# Patient Record
Sex: FEMALE | Race: BLACK OR AFRICAN AMERICAN | NOT HISPANIC OR LATINO | Employment: FULL TIME | URBAN - METROPOLITAN AREA
[De-identification: names, ages, dates, MRNs, and addresses within clinical notes are randomized per-mention and may not be internally consistent; named-entity substitution may affect disease eponyms.]

---

## 2017-01-23 DIAGNOSIS — M32.14 LUPUS NEPHRITIS: ICD-10-CM

## 2017-01-23 DIAGNOSIS — M32.9 LUPUS (SYSTEMIC LUPUS ERYTHEMATOSUS): Primary | ICD-10-CM

## 2017-01-23 RX ORDER — MYCOPHENOLATE MOFETIL 250 MG/1
250 CAPSULE ORAL 2 TIMES DAILY
Qty: 60 CAPSULE | Refills: 0 | Status: SHIPPED | OUTPATIENT
Start: 2017-01-23 | End: 2017-01-25

## 2017-01-23 NOTE — TELEPHONE ENCOUNTER
----- Message from Rocio Crow MA sent at 1/23/2017  1:20 PM CST -----  Contact: self/home      ----- Message -----     From: Purnima Rios     Sent: 1/23/2017  12:26 PM       To: [facility] Staff    Pt would like a refill on her melatonin medication and would like it to be called in to [facility]. Pt would also like a refill to be added to the rx. Pt would also like to speak with you regarding her medication.

## 2017-01-23 NOTE — TELEPHONE ENCOUNTER
Patient requesting mycophenolate refill.  Patient will get labs done at [facility] 11:30 am Tuesday January 24th

## 2017-01-24 ENCOUNTER — LAB VISIT (OUTPATIENT)
Dept: LAB | Facility: HOSPITAL | Age: 52
End: 2017-01-24
Attending: INTERNAL MEDICINE
Payer: COMMERCIAL

## 2017-01-24 DIAGNOSIS — M32.14 LUPUS NEPHRITIS: ICD-10-CM

## 2017-01-24 DIAGNOSIS — M32.9 LUPUS (SYSTEMIC LUPUS ERYTHEMATOSUS): ICD-10-CM

## 2017-01-24 LAB
ALBUMIN SERPL BCP-MCNC: 3.6 G/DL
ALP SERPL-CCNC: 48 U/L
ALT SERPL W/O P-5'-P-CCNC: 11 U/L
ANION GAP SERPL CALC-SCNC: 6 MMOL/L
AST SERPL-CCNC: 16 U/L
BASOPHILS # BLD AUTO: 0 K/UL
BASOPHILS NFR BLD: 0 %
BILIRUB SERPL-MCNC: 0.3 MG/DL
BUN SERPL-MCNC: 42 MG/DL
C3 SERPL-MCNC: 89 MG/DL
C4 SERPL-MCNC: 26 MG/DL
CALCIUM SERPL-MCNC: 9.8 MG/DL
CHLORIDE SERPL-SCNC: 110 MMOL/L
CO2 SERPL-SCNC: 23 MMOL/L
CREAT SERPL-MCNC: 2.2 MG/DL
CRP SERPL-MCNC: 0.4 MG/L
DIFFERENTIAL METHOD: ABNORMAL
EOSINOPHIL # BLD AUTO: 0 K/UL
EOSINOPHIL NFR BLD: 0 %
ERYTHROCYTE [DISTWIDTH] IN BLOOD BY AUTOMATED COUNT: 12.7 %
ERYTHROCYTE [SEDIMENTATION RATE] IN BLOOD BY WESTERGREN METHOD: 43 MM/HR
EST. GFR  (AFRICAN AMERICAN): 28.5 ML/MIN/1.73 M^2
EST. GFR  (NON AFRICAN AMERICAN): 24.7 ML/MIN/1.73 M^2
GLUCOSE SERPL-MCNC: 83 MG/DL
HCT VFR BLD AUTO: 34.1 %
HGB BLD-MCNC: 10.9 G/DL
LYMPHOCYTES # BLD AUTO: 1.3 K/UL
LYMPHOCYTES NFR BLD: 20.3 %
MCH RBC QN AUTO: 29.4 PG
MCHC RBC AUTO-ENTMCNC: 32 %
MCV RBC AUTO: 92 FL
MONOCYTES # BLD AUTO: 0.5 K/UL
MONOCYTES NFR BLD: 7.4 %
NEUTROPHILS # BLD AUTO: 4.6 K/UL
NEUTROPHILS NFR BLD: 72 %
PLATELET # BLD AUTO: 311 K/UL
PMV BLD AUTO: 10.7 FL
POTASSIUM SERPL-SCNC: 5.1 MMOL/L
PROT SERPL-MCNC: 7.4 G/DL
RBC # BLD AUTO: 3.71 M/UL
SODIUM SERPL-SCNC: 139 MMOL/L
WBC # BLD AUTO: 6.35 K/UL

## 2017-01-24 PROCEDURE — 80053 COMPREHEN METABOLIC PANEL: CPT

## 2017-01-24 PROCEDURE — 86140 C-REACTIVE PROTEIN: CPT

## 2017-01-24 PROCEDURE — 86160 COMPLEMENT ANTIGEN: CPT | Mod: 59

## 2017-01-24 PROCEDURE — 85651 RBC SED RATE NONAUTOMATED: CPT

## 2017-01-24 PROCEDURE — 36415 COLL VENOUS BLD VENIPUNCTURE: CPT | Mod: PO

## 2017-01-24 PROCEDURE — 85025 COMPLETE CBC W/AUTO DIFF WBC: CPT

## 2017-01-24 PROCEDURE — 86225 DNA ANTIBODY NATIVE: CPT

## 2017-01-24 PROCEDURE — 86160 COMPLEMENT ANTIGEN: CPT

## 2017-01-25 ENCOUNTER — TELEPHONE (OUTPATIENT)
Dept: RHEUMATOLOGY | Facility: CLINIC | Age: 52
End: 2017-01-25

## 2017-01-25 DIAGNOSIS — M32.9 SYSTEMIC LUPUS ERYTHEMATOSUS, UNSPECIFIED SLE TYPE, UNSPECIFIED ORGAN INVOLVEMENT STATUS: ICD-10-CM

## 2017-01-25 DIAGNOSIS — M32.14 LUPUS NEPHRITIS: ICD-10-CM

## 2017-01-25 LAB — DSDNA AB SER-ACNC: NORMAL [IU]/ML

## 2017-01-25 RX ORDER — MYCOPHENOLATE MOFETIL 500 MG/1
500 TABLET ORAL 2 TIMES DAILY
Qty: 60 TABLET | Refills: 2 | Status: SHIPPED | OUTPATIENT
Start: 2017-01-25 | End: 2017-01-31 | Stop reason: SDUPTHER

## 2017-01-25 NOTE — TELEPHONE ENCOUNTER
Patient informed of labs. Increase in proteinuria. Will increase Cellcept 500 mg bid.  Repeat labs at next visit.

## 2017-01-27 ENCOUNTER — TELEPHONE (OUTPATIENT)
Dept: RHEUMATOLOGY | Facility: CLINIC | Age: 52
End: 2017-01-27

## 2017-01-27 NOTE — TELEPHONE ENCOUNTER
----- Message from Rocio Crow MA sent at 1/25/2017  1:39 PM CST -----  Contact: self       ----- Message -----     From: Taisha Lynne MA     Sent: 1/25/2017   1:14 PM       To: [facility] Staff    Medication that was called in cant get filled at [facility]'s  mycophenolate (CELLCEPT) 500 mg Tab and pt is pricey and wants to know if she can get another prescription since they insurance is getting triple the price. Can you see about a peer to peer for pt.  Pt can reached be at [phone].

## 2017-01-27 NOTE — TELEPHONE ENCOUNTER
Left message for patient to call.  Called pharmacy [facility] for clarification on the issue. [facility] is not in her preferred network but if she calls  and tells them that she wants [facility] to fill it they will allow it.  This has nothing to do with the physician.      Called patient and left the above information.

## 2017-01-31 ENCOUNTER — TELEPHONE (OUTPATIENT)
Dept: RHEUMATOLOGY | Facility: CLINIC | Age: 52
End: 2017-01-31

## 2017-01-31 DIAGNOSIS — M32.9 SYSTEMIC LUPUS ERYTHEMATOSUS, UNSPECIFIED SLE TYPE, UNSPECIFIED ORGAN INVOLVEMENT STATUS: ICD-10-CM

## 2017-01-31 DIAGNOSIS — M32.14 LUPUS NEPHRITIS: ICD-10-CM

## 2017-01-31 RX ORDER — MYCOPHENOLATE MOFETIL 500 MG/1
500 TABLET ORAL 2 TIMES DAILY
Qty: 60 TABLET | Refills: 2 | Status: SHIPPED | OUTPATIENT
Start: 2017-01-31 | End: 2018-01-12 | Stop reason: SDUPTHER

## 2017-01-31 NOTE — TELEPHONE ENCOUNTER
----- Message from Rehana Dos Santos sent at 1/31/2017  1:54 PM CST -----  Contact: [facility]/ Jorge [phone], Option 1  Pls send prescription for mycophenolate (CELLCEPT) 500 mg Tab to [facility] / [phone] (Fax)

## 2017-03-08 ENCOUNTER — TELEPHONE (OUTPATIENT)
Dept: RHEUMATOLOGY | Facility: CLINIC | Age: 52
End: 2017-03-08

## 2017-11-01 ENCOUNTER — OFFICE VISIT (OUTPATIENT)
Dept: OBSTETRICS AND GYNECOLOGY | Facility: CLINIC | Age: 52
End: 2017-11-01
Payer: COMMERCIAL

## 2017-11-01 ENCOUNTER — LAB VISIT (OUTPATIENT)
Dept: LAB | Facility: HOSPITAL | Age: 52
End: 2017-11-01
Attending: OBSTETRICS & GYNECOLOGY
Payer: COMMERCIAL

## 2017-11-01 VITALS
BODY MASS INDEX: 28.27 KG/M2 | DIASTOLIC BLOOD PRESSURE: 84 MMHG | WEIGHT: 180.13 LBS | SYSTOLIC BLOOD PRESSURE: 120 MMHG

## 2017-11-01 DIAGNOSIS — Z12.11 COLON CANCER SCREENING: ICD-10-CM

## 2017-11-01 DIAGNOSIS — N95.2 ATROPHIC VAGINITIS: ICD-10-CM

## 2017-11-01 DIAGNOSIS — Z01.419 ENCOUNTER FOR GYNECOLOGICAL EXAMINATION WITHOUT ABNORMAL FINDING: ICD-10-CM

## 2017-11-01 DIAGNOSIS — Z01.419 ENCOUNTER FOR GYNECOLOGICAL EXAMINATION WITHOUT ABNORMAL FINDING: Primary | ICD-10-CM

## 2017-11-01 DIAGNOSIS — Z12.4 CERVICAL CANCER SCREENING: ICD-10-CM

## 2017-11-01 DIAGNOSIS — Z12.31 VISIT FOR SCREENING MAMMOGRAM: ICD-10-CM

## 2017-11-01 PROCEDURE — 36415 COLL VENOUS BLD VENIPUNCTURE: CPT

## 2017-11-01 PROCEDURE — 87340 HEPATITIS B SURFACE AG IA: CPT

## 2017-11-01 PROCEDURE — 86695 HERPES SIMPLEX TYPE 1 TEST: CPT

## 2017-11-01 PROCEDURE — 86703 HIV-1/HIV-2 1 RESULT ANTBDY: CPT

## 2017-11-01 PROCEDURE — 86803 HEPATITIS C AB TEST: CPT

## 2017-11-01 PROCEDURE — 87591 N.GONORRHOEAE DNA AMP PROB: CPT

## 2017-11-01 PROCEDURE — 87624 HPV HI-RISK TYP POOLED RSLT: CPT

## 2017-11-01 PROCEDURE — 99386 PREV VISIT NEW AGE 40-64: CPT | Mod: S$GLB,,, | Performed by: OBSTETRICS & GYNECOLOGY

## 2017-11-01 PROCEDURE — 99999 PR PBB SHADOW E&M-EST. PATIENT-LVL IV: CPT | Mod: PBBFAC,,, | Performed by: OBSTETRICS & GYNECOLOGY

## 2017-11-01 PROCEDURE — 86706 HEP B SURFACE ANTIBODY: CPT

## 2017-11-01 PROCEDURE — 86592 SYPHILIS TEST NON-TREP QUAL: CPT

## 2017-11-01 PROCEDURE — 88175 CYTOPATH C/V AUTO FLUID REDO: CPT

## 2017-11-01 RX ORDER — ESTRADIOL 10 UG/1
1 INSERT VAGINAL
Qty: 8 TABLET | Refills: 11 | Status: SHIPPED | OUTPATIENT
Start: 2017-11-02 | End: 2018-07-19

## 2017-11-01 NOTE — PROGRESS NOTES
51 yo postmenopausal female who presents for routine gyn visit.  Reports no cycles since 2010.   . Desires STD testing. Has argument with  today and patient appears sad/distracted in the office  No h/o STDs.  No h/o abl Pap smears. Last pap many years ago.  No h/o abl mammograms. Last mammogram many years ago.    ROS: reports vaginal dryness.    PE:   Vitals: /84   Wt 81.7 kg (180 lb 1.9 oz)   LMP 05/17/2010   BMI 28.27 kg/m²   APPEARANCE: Well nourished, well developed, in no acute distress.  SKIN: Normal skin turgor, no lesions.  CHEST: Lungs clear to auscultation.  HEART: Regular rate and rhythm, no murmurs, rubs or gallops.  ABDOMEN: Soft. No tenderness or masses. No hepatosplenomegaly. No hernias.  BREASTS: Symmetrical, no skin changes or visible lesions. No palpable masses, nipple discharge or adenopathy bilaterally.  PELVIC: Normal external female genitalia without lesions. Normal hair distribution. Adequate perineal body, normal urethral meatus. Vagina moist and well rugated without lesions or discharge. Cervix pink and without lesions. No significant cystocele or rectocele. Bimanual exam showed uterus normal size, shape, position, mobile and nontender. Adnexa without masses or tenderness. Urethra and bladder normal.  EXTREMITIES: No clubbing cyanosis or edema.      AP  Routine gyn  -s/p normal breast exam: mammogram ordered  -s/p normal pelvic exam:   -Pap and HPV: collected  -STD testing: gc/chl and HIV, hep b, hep c, rpr, hsv ordered  -colonoscopy: referred to dr. lainez  -atrophic vaginitis: rx for vagifem sent    F/u in 3 months to discuss satisfaction and compliance with new medication    daylin maki MD

## 2017-11-02 LAB
C TRACH DNA SPEC QL NAA+PROBE: NOT DETECTED
HBV SURFACE AB SER-ACNC: NEGATIVE M[IU]/ML
HBV SURFACE AG SERPL QL IA: NEGATIVE
HCV AB SERPL QL IA: NEGATIVE
HIV 1+2 AB+HIV1 P24 AG SERPL QL IA: NEGATIVE
N GONORRHOEA DNA SPEC QL NAA+PROBE: NOT DETECTED
RPR SER QL: NORMAL

## 2017-11-03 LAB
HSV1 IGG SERPL QL IA: POSITIVE
HSV2 IGG SERPL QL IA: POSITIVE

## 2017-11-07 ENCOUNTER — TELEPHONE (OUTPATIENT)
Dept: OBSTETRICS AND GYNECOLOGY | Facility: CLINIC | Age: 52
End: 2017-11-07

## 2017-11-07 DIAGNOSIS — B00.9 HSV INFECTION: Primary | ICD-10-CM

## 2017-11-07 LAB
HPV16 AG SPEC QL: NEGATIVE
HPV16+18+H RISK 12 DNA CVX-IMP: POSITIVE
HPV18 DNA SPEC QL NAA+PROBE: NEGATIVE

## 2017-11-07 RX ORDER — ACYCLOVIR 800 MG/1
800 TABLET ORAL 2 TIMES DAILY
Qty: 60 TABLET | Refills: 12 | Status: SHIPPED | OUTPATIENT
Start: 2017-11-07 | End: 2018-07-19

## 2017-11-07 NOTE — TELEPHONE ENCOUNTER
Pt. Was given her HSV results and she will take her HSV meds. For 5 days, for her out break, she will call if she ever gets an outbreak and ask for a refill.

## 2017-11-07 NOTE — TELEPHONE ENCOUNTER
----- Message from Carroll Palma MD sent at 11/2/2017 10:33 PM CDT -----  Inform patient: HIV, hep B and Hep C, RPR, and gonorrhea/chlamydia are negative for infection.  Waiting for herpes results.    Dr palma

## 2017-11-07 NOTE — TELEPHONE ENCOUNTER
Pt notified and verbalized understanding. Pt is requesting suppressive therapy. Information being sent out to patient.

## 2017-11-13 ENCOUNTER — TELEPHONE (OUTPATIENT)
Dept: OBSTETRICS AND GYNECOLOGY | Facility: CLINIC | Age: 52
End: 2017-11-13

## 2017-11-13 NOTE — TELEPHONE ENCOUNTER
Inform patient that her Pap was negative. But, it was positive for HPV.  We worry about type 16 and type 18 HPV. She was NEGATIVE for this type of HPV.   Recommendation is to repeat pap in one year to see if the other types of HPV have cleared up.     Dr maki     L/danielito for patient to give us a call back to give results above

## 2017-11-13 NOTE — TELEPHONE ENCOUNTER
----- Message from Patricia Ang sent at 11/13/2017 11:28 AM CST -----  Contact: Self/ [phone]  Patient called in returning your call.     Please call and advise.

## 2017-11-29 ENCOUNTER — TELEPHONE (OUTPATIENT)
Dept: GASTROENTEROLOGY | Facility: CLINIC | Age: 52
End: 2017-11-29

## 2017-11-29 RX ORDER — LOSARTAN POTASSIUM 100 MG/1
100 TABLET ORAL DAILY
Refills: 1 | COMMUNITY
Start: 2017-11-11 | End: 2018-07-19

## 2017-11-29 NOTE — TELEPHONE ENCOUNTER
Patient scheduled for COLONOSCOPY with Dr. Worley on 12/19/2017.  Miralax Split Prep instructions explained and mailed to patient . Lab will call two days before with arrival time at [facility]. Make sure to have someone to drive you home after procedure.  Verbalized understanding.    Colonoscopy Referral   Referring Physician: Dr. Carroll Palma   Date: 12/19/2017  Reason for Referral: Colon Cancer Screening  Family History of:   Colon polyp:  NO  Relationship/Age of Onset: N/A  Colon cancer: NO  Relationship/Age of Onset: N/A  Patient with:   Hemoccults Done: NO   Iron deficient: NO  On Blood Thinner: NO  Valvular heart disease/valve replacement:  NO  Anemia Present: Yes  On NSAID: NO  Lung disease: NO  Kidney disease: NO  Hx of polyps:NO  Hx of colon cancer: NO  Previous colon evalations: YES      When: 2011  Where: [facility]  Pertinent symptoms: NONE

## 2017-12-13 ENCOUNTER — OFFICE VISIT (OUTPATIENT)
Dept: RHEUMATOLOGY | Facility: CLINIC | Age: 52
End: 2017-12-13
Payer: COMMERCIAL

## 2017-12-13 ENCOUNTER — LAB VISIT (OUTPATIENT)
Dept: LAB | Facility: HOSPITAL | Age: 52
End: 2017-12-13
Attending: INTERNAL MEDICINE
Payer: COMMERCIAL

## 2017-12-13 VITALS
SYSTOLIC BLOOD PRESSURE: 151 MMHG | HEART RATE: 82 BPM | DIASTOLIC BLOOD PRESSURE: 87 MMHG | BODY MASS INDEX: 29.49 KG/M2 | WEIGHT: 177 LBS | TEMPERATURE: 98 F | HEIGHT: 65 IN

## 2017-12-13 DIAGNOSIS — R53.83 OTHER FATIGUE: ICD-10-CM

## 2017-12-13 DIAGNOSIS — E55.9 VITAMIN D DEFICIENCY DISEASE: ICD-10-CM

## 2017-12-13 DIAGNOSIS — M32.14 SYSTEMIC LUPUS ERYTHEMATOSUS WITH GLOMERULAR DISEASE, UNSPECIFIED SLE TYPE: ICD-10-CM

## 2017-12-13 DIAGNOSIS — M32.14 SYSTEMIC LUPUS ERYTHEMATOSUS WITH GLOMERULAR DISEASE, UNSPECIFIED SLE TYPE: Primary | ICD-10-CM

## 2017-12-13 DIAGNOSIS — D84.9 IMMUNOSUPPRESSION: ICD-10-CM

## 2017-12-13 LAB
BACTERIA #/AREA URNS AUTO: NORMAL /HPF
BILIRUB UR QL STRIP: NEGATIVE
CLARITY UR REFRACT.AUTO: CLEAR
COLOR UR AUTO: ABNORMAL
CREAT UR-MCNC: 116 MG/DL
GLUCOSE UR QL STRIP: NEGATIVE
HGB UR QL STRIP: ABNORMAL
HYALINE CASTS UR QL AUTO: 0 /LPF
KETONES UR QL STRIP: NEGATIVE
LEUKOCYTE ESTERASE UR QL STRIP: NEGATIVE
MICROSCOPIC COMMENT: NORMAL
NITRITE UR QL STRIP: NEGATIVE
PH UR STRIP: 5 [PH] (ref 5–8)
PROT UR QL STRIP: ABNORMAL
PROT UR-MCNC: 138 MG/DL
PROT/CREAT RATIO, UR: 1.19
RBC #/AREA URNS AUTO: 3 /HPF (ref 0–4)
SP GR UR STRIP: 1.01 (ref 1–1.03)
SQUAMOUS #/AREA URNS AUTO: 0 /HPF
URN SPEC COLLECT METH UR: ABNORMAL
UROBILINOGEN UR STRIP-ACNC: NEGATIVE EU/DL
WBC #/AREA URNS AUTO: 0 /HPF (ref 0–5)

## 2017-12-13 PROCEDURE — 82570 ASSAY OF URINE CREATININE: CPT

## 2017-12-13 PROCEDURE — 81001 URINALYSIS AUTO W/SCOPE: CPT

## 2017-12-13 PROCEDURE — 99214 OFFICE O/P EST MOD 30 MIN: CPT | Mod: S$GLB,,, | Performed by: INTERNAL MEDICINE

## 2017-12-13 PROCEDURE — 99999 PR PBB SHADOW E&M-EST. PATIENT-LVL III: CPT | Mod: PBBFAC,,, | Performed by: INTERNAL MEDICINE

## 2017-12-13 NOTE — PROGRESS NOTES
Subjective:       Patient ID: Talya Bates is a 52 y.o. female.    Chief Complaint: Lupus    HPI:  Talya Bates is a 52 y.o. female with systemic lupus erythematosus manifested by Class IV nephritis, history of hemolytic anemia,  malar rash, a low white cell count, arthritis, photosensitivity, hair loss, dry eyes, dry mouth, swollen lymph glands, fatigue and possibly Raynaud's.       Interval History:  Saw Dr Cotton rheumatologist who gave her Cellcept 500 mg qpm in November.   She did not restart Plaquenil as suggested by Dr. Cotton.    Was bitten by something on left side of forehead 4 months ago.  She scratched area and now bigger.  She has skin lesion on right arm.  She will see dermatologist outside [facility].   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     Review of Systems   Constitutional: Positive for fatigue.   HENT:        Dry mouth   Eyes:        Dry eyes   Respiratory: Negative.    Cardiovascular: Negative.    Gastrointestinal: Negative.    Endocrine: Negative.    Genitourinary: Negative.    Musculoskeletal: Negative.    Allergic/Immunologic: Negative.    Neurological: Positive for headaches.   Hematological: Positive for adenopathy.   Psychiatric/Behavioral: Negative.          Objective:   [facility] 05/17/2010      Physical Exam   Constitutional: She is oriented to person, place, and time and well-developed, well-nourished, and in no distress.   HENT:   Head: Normocephalic and atraumatic.   Eyes: Conjunctivae and EOM are normal.   Neck: Neck supple.   Cardiovascular: Normal rate, regular rhythm and normal heart sounds.    Pulmonary/Chest: Effort normal and breath sounds normal.   Abdominal: Soft. Bowel sounds are normal.   Neurological: She is alert and oriented to person, place, and time. Gait normal.   Skin: Skin is warm and dry.     1x1cm hyperpigmentation on left side forehead   Right arm with light pink scab  Left arm healing scab   Psychiatric: Mood and affect normal.   Musculoskeletal: She  exhibits no edema or deformity.            Assessment:       1. History of systemic lupus erythematosus with manifestations with Class IV nephritis, history of hemolytic anemia,  malar rash, a low white cell count, arthritis, photosensitivity, hair loss, dry eyes, dry mouth,   swollen lymph glands, fatigue and possibly Raynaud's.  She is currently on Cellcept and losartan.  Off prednisone for 1 year.   2. Right temporal artery pain and enlargement. Biopsy negative.   CTA of head and neck without vasculitis but small lymph nodes noted.  3. Fatigue. Patient working one job still.   4. Myalgias with a history of fibromyalgia.   5. Mood swings.   6. Elevated BP in the past   7. Erythema nodosum lower legs. No further episodes  8. Bilateral knee pain in the past. Suspect it may be osteoarthritis.  9. Proteinuria.   10. Vitamin D deficiency  11. Memory loss. Continues to have memory loss. Possibly due to lupus. Needs evaluation with cognitive.   12. Abnormal bone density. Patient did not  get result from [facility]  Plan:       1. Patient not on Plaquenil 200mg bid.  Now on Cellcept 500 mg evening.  Tolerating  2. Labs todays  3. Can use Voltaren gel for joint issues  4. Patient to follow with nephrology at [facility].   5. Evaluated by neurology for memory loss and cognitive testing. EEG normal but did not get MRI.  6. Marital stress    The patient was seen face-to-face for 25 minutes. Greater than 50% of   this time was spent in counseling regarding management of systemic lupus   erythematosus and memory.      RTO 3 months/prn.

## 2017-12-14 ENCOUNTER — TELEPHONE (OUTPATIENT)
Dept: RHEUMATOLOGY | Facility: CLINIC | Age: 52
End: 2017-12-14

## 2017-12-14 DIAGNOSIS — M32.14 SYSTEMIC LUPUS ERYTHEMATOSUS WITH GLOMERULAR DISEASE, UNSPECIFIED SLE TYPE: Primary | ICD-10-CM

## 2017-12-14 DIAGNOSIS — R53.83 OTHER FATIGUE: ICD-10-CM

## 2017-12-14 DIAGNOSIS — D84.9 IMMUNOSUPPRESSION: ICD-10-CM

## 2017-12-14 NOTE — TELEPHONE ENCOUNTER
Labs show low white blood cell count, anemia, elevated sedimentation rate, elevated creatinine at 2.8, low vitamin D at 22, normal complements, and elevated protein creatinine ratio 1.19.    Patient informed of results.  She notes kidney test was 21 recently (assuming that it is the GFR).   She started Cellcept one month ago and now WBC count is lower.  Will hold on increasing Cellcept until labs reviewed in a month.  She will get Plaquenil eye exam and we will start Plaquenil.  Labs in one month at [facility] 1/12/18 at 11 am     Patient to get vitamin D3 OTC 1,000 units and take daily.

## 2017-12-15 ENCOUNTER — TELEPHONE (OUTPATIENT)
Dept: GASTROENTEROLOGY | Facility: CLINIC | Age: 52
End: 2017-12-15

## 2017-12-23 ENCOUNTER — OFFICE VISIT (OUTPATIENT)
Dept: URGENT CARE | Facility: CLINIC | Age: 52
End: 2017-12-23
Payer: COMMERCIAL

## 2017-12-23 VITALS
OXYGEN SATURATION: 100 % | HEIGHT: 66 IN | SYSTOLIC BLOOD PRESSURE: 139 MMHG | BODY MASS INDEX: 28.93 KG/M2 | WEIGHT: 180 LBS | TEMPERATURE: 101 F | HEART RATE: 115 BPM | DIASTOLIC BLOOD PRESSURE: 89 MMHG

## 2017-12-23 DIAGNOSIS — R68.89 FLU-LIKE SYMPTOMS: ICD-10-CM

## 2017-12-23 DIAGNOSIS — R00.0 TACHYCARDIA: ICD-10-CM

## 2017-12-23 DIAGNOSIS — R50.9 FEVER, UNSPECIFIED FEVER CAUSE: ICD-10-CM

## 2017-12-23 DIAGNOSIS — J10.1 INFLUENZA A: Primary | ICD-10-CM

## 2017-12-23 DIAGNOSIS — R05.9 COUGH: ICD-10-CM

## 2017-12-23 LAB
CTP QC/QA: YES
FLUAV AG NPH QL: POSITIVE
FLUBV AG NPH QL: NEGATIVE

## 2017-12-23 PROCEDURE — 87804 INFLUENZA ASSAY W/OPTIC: CPT | Mod: QW,S$GLB,, | Performed by: PHYSICIAN ASSISTANT

## 2017-12-23 PROCEDURE — 99203 OFFICE O/P NEW LOW 30 MIN: CPT | Mod: 25,S$GLB,, | Performed by: PHYSICIAN ASSISTANT

## 2017-12-23 RX ORDER — IBUPROFEN 200 MG
600 TABLET ORAL
Status: COMPLETED | OUTPATIENT
Start: 2017-12-23 | End: 2017-12-23

## 2017-12-23 RX ORDER — OSELTAMIVIR PHOSPHATE 75 MG/1
75 CAPSULE ORAL 2 TIMES DAILY
Qty: 10 CAPSULE | Refills: 0 | Status: SHIPPED | OUTPATIENT
Start: 2017-12-23 | End: 2017-12-28

## 2017-12-23 RX ORDER — PROMETHAZINE HYDROCHLORIDE AND DEXTROMETHORPHAN HYDROBROMIDE 6.25; 15 MG/5ML; MG/5ML
5 SYRUP ORAL
Qty: 118 ML | Refills: 0 | Status: SHIPPED | OUTPATIENT
Start: 2017-12-23 | End: 2017-12-30

## 2017-12-23 RX ADMIN — Medication 600 MG: at 09:12

## 2017-12-23 NOTE — PROGRESS NOTES
"Subjective:       Patient ID: Talya Bates is a 52 y.o. female.    Vitals:  height is 5' 6" (1.676 m) and weight is 81.6 kg (180 lb). Her temperature is 100.9 °F (38.3 °C) (abnormal). Her blood pressure is 139/89 and her pulse is 115 (abnormal). Her oxygen saturation is 100%.     Chief Complaint: Cough and Nasal Congestion    Cough   This is a new problem. The current episode started 1 to 4 weeks ago. The problem has been gradually worsening. The problem occurs every few minutes. The cough is non-productive. Associated symptoms include chills, ear congestion, ear pain, a fever, headaches, myalgias, nasal congestion and postnasal drip. Pertinent negatives include no shortness of breath. The symptoms are aggravated by lying down. Treatments tried: metamucil flu and cold. The treatment provided no relief.     Review of Systems   Constitution: Positive for chills, decreased appetite, fever and malaise/fatigue.   HENT: Positive for congestion, ear pain and postnasal drip.    Respiratory: Positive for cough. Negative for shortness of breath.    Musculoskeletal: Positive for myalgias.   Neurological: Positive for headaches.       Objective:      Physical Exam   Constitutional: She is oriented to person, place, and time. She appears well-developed and well-nourished. She appears listless. She is cooperative.  Non-toxic appearance. She appears ill. No distress.   HENT:   Head: Normocephalic and atraumatic.   Right Ear: Hearing, external ear and ear canal normal. Tympanic membrane is erythematous. Tympanic membrane is not retracted and not bulging. A middle ear effusion (serous) is present.   Left Ear: Hearing, external ear and ear canal normal. Tympanic membrane is erythematous. Tympanic membrane is not retracted and not bulging.  No middle ear effusion.   Nose: Mucosal edema and rhinorrhea present. No nasal deformity. No epistaxis. Right sinus exhibits no maxillary sinus tenderness and no frontal sinus tenderness. " Left sinus exhibits no maxillary sinus tenderness and no frontal sinus tenderness.   Mouth/Throat: Uvula is midline and mucous membranes are normal. No trismus in the jaw. Normal dentition. No uvula swelling. Posterior oropharyngeal erythema present. No oropharyngeal exudate or posterior oropharyngeal edema. No tonsillar exudate.   Eyes: Conjunctivae and lids are normal. Right eye exhibits no discharge. Left eye exhibits no discharge. No scleral icterus.   Sclera clear bilat   Neck: Trachea normal, normal range of motion, full passive range of motion without pain and phonation normal. Neck supple.   Cardiovascular: Regular rhythm, normal heart sounds, intact distal pulses and normal pulses.  Tachycardia present.    Pulmonary/Chest: Effort normal and breath sounds normal. No respiratory distress. She has no decreased breath sounds. She has no wheezes. She has no rhonchi. She has no rales.   Abdominal: Normal appearance.   Musculoskeletal: Normal range of motion. She exhibits no edema or deformity.   Lymphadenopathy:     She has no cervical adenopathy.   Neurological: She is oriented to person, place, and time. She appears listless. She exhibits normal muscle tone. Coordination normal.   Skin: Skin is warm, dry and intact. She is not diaphoretic. No pallor.   Psychiatric: She has a normal mood and affect. Her speech is normal and behavior is normal. Judgment and thought content normal. Cognition and memory are normal.   Nursing note and vitals reviewed.      Office Visit on 12/23/2017   Component Date Value Ref Range Status    Rapid Influenza A Ag 12/23/2017 Positive* Negative Final    Rapid Influenza B Ag 12/23/2017 Negative  Negative Final     Acceptable 12/23/2017 Yes   Final         Assessment:       1. Influenza A    2. Flu-like symptoms    3. Fever, unspecified fever cause    4. Tachycardia    5. Cough        Plan:         Influenza A  -     oseltamivir (TAMIFLU) 75 MG capsule; Take 1 capsule  (75 mg total) by mouth 2 (two) times daily.  Dispense: 10 capsule; Refill: 0    Flu-like symptoms  -     POCT Influenza A/B    Fever, unspecified fever cause  -     ibuprofen tablet 600 mg; Take 3 tablets (600 mg total) by mouth one time.    Tachycardia    Cough  -     promethazine-dextromethorphan (PROMETHAZINE-DM) 6.25-15 mg/5 mL Syrp; Take 5 mLs by mouth every 4 to 6 hours as needed.  Dispense: 118 mL; Refill: 0          Patient Instructions   Please return here or go to the Emergency Department for any concerns or worsening of condition.  If you were prescribed antibiotics, please take them to completion.  If you were prescribed a narcotic medication, do not drive or operate heavy equipment or machinery while taking these medications.  Please follow up with your primary care doctor or specialist as needed.    If you  smoke, please stop smoking.    Symptomatic treatment:    Tylenol every 4 hours  Ibuprofen ever 6 hours  salt water gargles  Cold-eeze helps to reduce the duration of sore throat symptoms  Cepachol helps to numb the discomfort  Chloroseptic spray  Nasal saline spray reduces inflammation and dryness  Warm face compresses as often as you can  Vicks vapor rub at night  Flonase OTC or Nasacort OTC  Simple foods like chicken noodle soup help  Mucinex DM helps loosen mucus and control cough  Zyrtec/Claritin during the day and Benadryl at night may help if allergy component   Rest as much as you can      Influenza (Adult)    Influenza is also called the flu. It is a viral illness that affects the air passages of your lungs. It is different from the common cold. The flu can easily be passed from one to person to another. It may be spread through the air by coughing and sneezing. Or it can be spread by touching the sick person and then touching your own eyes, nose, or mouth.  The flu starts 1 to 3 days after you are exposed to the flu virus. It may last for 1 to 2 weeks but many people feel tired or fatigued  for many weeks afterward. You usually dont need to take antibiotics unless you have a complication. This might be an ear or sinus infection or pneumonia.  Symptoms of the flu may be mild or severe. They can include extreme tiredness (wanting to stay in bed all day), chills, fevers, muscle aches, soreness with eye movement, headache, and a dry, hacking cough.  Home care  Follow these guidelines when caring for yourself at home:  · Avoid being around cigarette smoke, whether yours or other peoples.  · Acetaminophen or ibuprofen will help ease your fever, muscle aches, and headache. Dont give aspirin to anyone younger than 18 who has the flu. Aspirin can harm the liver.  · Nausea and loss of appetite are common with the flu. Eat light meals. Drink 6 to 8 glasses of liquids every day. Good choices are water, sport drinks, soft drinks without caffeine, juices, tea, and soup. Extra fluids will also help loosen secretions in your nose and lungs.  · Over-the-counter cold medicines will not make the flu go away faster. But the medicines may help with coughing, sore throat, and congestion in your nose and sinuses. Dont use a decongestant if you have high blood pressure.  · Stay home until your fever has been gone for at least 24 hours without using medicine to reduce fever.  Follow-up care  Follow up with your healthcare provider, or as advised, if you are not getting better over the next week.  If you are age 65 or older, talk with your provider about getting a pneumococcal vaccine every 5 years. You should also get this vaccine if you have chronic asthma or COPD. All adults should get a flu vaccine every fall. Ask your provider about this.  When to seek medical advice  Call your healthcare provider right away if any of these occur:  · Cough with lots of colored mucus (sputum) or blood in your mucus  · Chest pain, shortness of breath, wheezing, or trouble breathing  · Severe headache, or face, neck, or ear pain  · New  rash with fever  · Fever of 100.4°F (38°C) or higher, or as directed by your healthcare provider  · Confusion, behavior change, or seizure  · Severe weakness or dizziness  · You get a new fever or cough after getting better for a few days  Date Last Reviewed: 1/1/2017  © 8395-0789 [employer]. [address] [place] [address], [place] [address]. All rights reserved. This information is not intended as a substitute for professional medical care. Always follow your healthcare professional's instructions.

## 2017-12-23 NOTE — PATIENT INSTRUCTIONS
Please return here or go to the Emergency Department for any concerns or worsening of condition.  If you were prescribed antibiotics, please take them to completion.  If you were prescribed a narcotic medication, do not drive or operate heavy equipment or machinery while taking these medications.  Please follow up with your primary care doctor or specialist as needed.    If you  smoke, please stop smoking.    Symptomatic treatment:    Tylenol every 4 hours  Ibuprofen ever 6 hours  salt water gargles  Cold-eeze helps to reduce the duration of sore throat symptoms  Cepachol helps to numb the discomfort  Chloroseptic spray  Nasal saline spray reduces inflammation and dryness  Warm face compresses as often as you can  Vicks vapor rub at night  Flonase OTC or Nasacort OTC  Simple foods like chicken noodle soup help  Mucinex DM helps loosen mucus and control cough  Zyrtec/Claritin during the day and Benadryl at night may help if allergy component   Rest as much as you can      Influenza (Adult)    Influenza is also called the flu. It is a viral illness that affects the air passages of your lungs. It is different from the common cold. The flu can easily be passed from one to person to another. It may be spread through the air by coughing and sneezing. Or it can be spread by touching the sick person and then touching your own eyes, nose, or mouth.  The flu starts 1 to 3 days after you are exposed to the flu virus. It may last for 1 to 2 weeks but many people feel tired or fatigued for many weeks afterward. You usually dont need to take antibiotics unless you have a complication. This might be an ear or sinus infection or pneumonia.  Symptoms of the flu may be mild or severe. They can include extreme tiredness (wanting to stay in bed all day), chills, fevers, muscle aches, soreness with eye movement, headache, and a dry, hacking cough.  Home care  Follow these guidelines when caring for yourself at home:  · Avoid being  around cigarette smoke, whether yours or other peoples.  · Acetaminophen or ibuprofen will help ease your fever, muscle aches, and headache. Dont give aspirin to anyone younger than 18 who has the flu. Aspirin can harm the liver.  · Nausea and loss of appetite are common with the flu. Eat light meals. Drink 6 to 8 glasses of liquids every day. Good choices are water, sport drinks, soft drinks without caffeine, juices, tea, and soup. Extra fluids will also help loosen secretions in your nose and lungs.  · Over-the-counter cold medicines will not make the flu go away faster. But the medicines may help with coughing, sore throat, and congestion in your nose and sinuses. Dont use a decongestant if you have high blood pressure.  · Stay home until your fever has been gone for at least 24 hours without using medicine to reduce fever.  Follow-up care  Follow up with your healthcare provider, or as advised, if you are not getting better over the next week.  If you are age 65 or older, talk with your provider about getting a pneumococcal vaccine every 5 years. You should also get this vaccine if you have chronic asthma or COPD. All adults should get a flu vaccine every fall. Ask your provider about this.  When to seek medical advice  Call your healthcare provider right away if any of these occur:  · Cough with lots of colored mucus (sputum) or blood in your mucus  · Chest pain, shortness of breath, wheezing, or trouble breathing  · Severe headache, or face, neck, or ear pain  · New rash with fever  · Fever of 100.4°F (38°C) or higher, or as directed by your healthcare provider  · Confusion, behavior change, or seizure  · Severe weakness or dizziness  · You get a new fever or cough after getting better for a few days  Date Last Reviewed: 1/1/2017  © 3297-8786 [employer]. [address] [place] [address], [place] [address]. All rights reserved. This information is not intended as a substitute for professional medical  care. Always follow your healthcare professional's instructions.

## 2018-01-12 ENCOUNTER — LAB VISIT (OUTPATIENT)
Dept: LAB | Facility: HOSPITAL | Age: 53
End: 2018-01-12
Attending: INTERNAL MEDICINE
Payer: COMMERCIAL

## 2018-01-12 DIAGNOSIS — M32.14 SYSTEMIC LUPUS ERYTHEMATOSUS WITH GLOMERULAR DISEASE, UNSPECIFIED SLE TYPE: ICD-10-CM

## 2018-01-12 DIAGNOSIS — D84.9 IMMUNOSUPPRESSION: ICD-10-CM

## 2018-01-12 DIAGNOSIS — M32.14 LUPUS NEPHRITIS: ICD-10-CM

## 2018-01-12 DIAGNOSIS — R53.83 OTHER FATIGUE: ICD-10-CM

## 2018-01-12 DIAGNOSIS — M32.9 SYSTEMIC LUPUS ERYTHEMATOSUS, UNSPECIFIED SLE TYPE, UNSPECIFIED ORGAN INVOLVEMENT STATUS: ICD-10-CM

## 2018-01-12 LAB
ALBUMIN SERPL BCP-MCNC: 3.5 G/DL
ALP SERPL-CCNC: 56 U/L
ALT SERPL W/O P-5'-P-CCNC: 11 U/L
ANION GAP SERPL CALC-SCNC: 6 MMOL/L
AST SERPL-CCNC: 20 U/L
BASOPHILS # BLD AUTO: 0 K/UL
BASOPHILS NFR BLD: 0 %
BILIRUB SERPL-MCNC: 0.3 MG/DL
BUN SERPL-MCNC: 35 MG/DL
C3 SERPL-MCNC: 83 MG/DL
C4 SERPL-MCNC: 29 MG/DL
CALCIUM SERPL-MCNC: 9.5 MG/DL
CHLORIDE SERPL-SCNC: 114 MMOL/L
CO2 SERPL-SCNC: 22 MMOL/L
CREAT SERPL-MCNC: 3 MG/DL
CRP SERPL-MCNC: 1.2 MG/L
DIFFERENTIAL METHOD: ABNORMAL
EOSINOPHIL # BLD AUTO: 0 K/UL
EOSINOPHIL NFR BLD: 0 %
ERYTHROCYTE [DISTWIDTH] IN BLOOD BY AUTOMATED COUNT: 14.5 %
ERYTHROCYTE [SEDIMENTATION RATE] IN BLOOD BY WESTERGREN METHOD: 114 MM/HR
EST. GFR  (AFRICAN AMERICAN): 19.4 ML/MIN/1.73 M^2
EST. GFR  (NON AFRICAN AMERICAN): 16.8 ML/MIN/1.73 M^2
GLUCOSE SERPL-MCNC: 80 MG/DL
HCT VFR BLD AUTO: 29.2 %
HGB BLD-MCNC: 9 G/DL
IMM GRANULOCYTES # BLD AUTO: 0.01 K/UL
IMM GRANULOCYTES NFR BLD AUTO: 0.3 %
LYMPHOCYTES # BLD AUTO: 1 K/UL
LYMPHOCYTES NFR BLD: 27.4 %
MCH RBC QN AUTO: 29.7 PG
MCHC RBC AUTO-ENTMCNC: 30.8 G/DL
MCV RBC AUTO: 96 FL
MONOCYTES # BLD AUTO: 0.7 K/UL
MONOCYTES NFR BLD: 19.6 %
NEUTROPHILS # BLD AUTO: 1.8 K/UL
NEUTROPHILS NFR BLD: 52.7 %
NRBC BLD-RTO: 0 /100 WBC
PLATELET # BLD AUTO: 217 K/UL
PMV BLD AUTO: 10.7 FL
POTASSIUM SERPL-SCNC: 4.7 MMOL/L
PROT SERPL-MCNC: 7.6 G/DL
RBC # BLD AUTO: 3.03 M/UL
SODIUM SERPL-SCNC: 142 MMOL/L
WBC # BLD AUTO: 3.47 K/UL

## 2018-01-12 PROCEDURE — 86140 C-REACTIVE PROTEIN: CPT

## 2018-01-12 PROCEDURE — 86160 COMPLEMENT ANTIGEN: CPT

## 2018-01-12 PROCEDURE — 85651 RBC SED RATE NONAUTOMATED: CPT

## 2018-01-12 PROCEDURE — 86225 DNA ANTIBODY NATIVE: CPT

## 2018-01-12 PROCEDURE — 85025 COMPLETE CBC W/AUTO DIFF WBC: CPT

## 2018-01-12 PROCEDURE — 36415 COLL VENOUS BLD VENIPUNCTURE: CPT

## 2018-01-12 PROCEDURE — 86160 COMPLEMENT ANTIGEN: CPT | Mod: 59

## 2018-01-12 PROCEDURE — 80053 COMPREHEN METABOLIC PANEL: CPT

## 2018-01-12 RX ORDER — MYCOPHENOLATE MOFETIL 500 MG/1
500 TABLET ORAL 2 TIMES DAILY
Qty: 60 TABLET | Refills: 2 | Status: ON HOLD | OUTPATIENT
Start: 2018-01-12 | End: 2018-07-24

## 2018-01-15 ENCOUNTER — TELEPHONE (OUTPATIENT)
Dept: RHEUMATOLOGY | Facility: CLINIC | Age: 53
End: 2018-01-15

## 2018-01-15 LAB — DSDNA AB SER-ACNC: NORMAL [IU]/ML

## 2018-01-15 NOTE — TELEPHONE ENCOUNTER
Labs show GFR has decreased.  Hemoglobin has decreased.  WBC improved.  Sed rate elevated.  Complements are normal.  Protein decreased slightly in urine.    Left message for patient to call office with best time to reach her.

## 2018-05-17 ENCOUNTER — CLINICAL SUPPORT (OUTPATIENT)
Dept: OTHER | Facility: CLINIC | Age: 53
End: 2018-05-17
Payer: COMMERCIAL

## 2018-05-17 DIAGNOSIS — Z00.8 HEALTH EXAMINATION IN POPULATION SURVEYS: Primary | ICD-10-CM

## 2018-05-17 PROCEDURE — 80061 LIPID PANEL: CPT | Mod: QW,S$GLB,, | Performed by: INTERNAL MEDICINE

## 2018-05-17 PROCEDURE — 99401 PREV MED CNSL INDIV APPRX 15: CPT | Mod: S$GLB,,, | Performed by: INTERNAL MEDICINE

## 2018-05-17 PROCEDURE — 82947 ASSAY GLUCOSE BLOOD QUANT: CPT | Mod: QW,S$GLB,, | Performed by: INTERNAL MEDICINE

## 2018-05-20 VITALS
BODY MASS INDEX: 28.61 KG/M2 | SYSTOLIC BLOOD PRESSURE: 152 MMHG | WEIGHT: 178 LBS | HEIGHT: 66 IN | DIASTOLIC BLOOD PRESSURE: 83 MMHG

## 2018-05-20 LAB
GLUCOSE SERPL-MCNC: NORMAL MG/DL (ref 60–140)
POC CHOLESTEROL, HDL: 65 MG/DL (ref 40–?)
POC CHOLESTEROL, LDL: 134 MG/DL (ref ?–160)
POC CHOLESTEROL, TOTAL: 217 MG/DL (ref ?–240)
POC GLUCOSE FASTING: 105 MG/DL (ref 60–110)
POC TOTAL CHOLESTEROL / HDL RATIO: 3.34 (ref ?–6)
POC TRIGLYCERIDES: 89 MG/DL (ref ?–160)

## 2018-07-19 ENCOUNTER — HOSPITAL ENCOUNTER (INPATIENT)
Facility: HOSPITAL | Age: 53
LOS: 5 days | Discharge: HOME | DRG: 683 | End: 2018-07-24
Attending: EMERGENCY MEDICINE | Admitting: HOSPITALIST
Payer: COMMERCIAL

## 2018-07-19 DIAGNOSIS — N17.8 ACUTE RENAL FAILURE WITH SPECIFIED LESION: ICD-10-CM

## 2018-07-19 DIAGNOSIS — M32.19 OTHER SYSTEMIC LUPUS ERYTHEMATOSUS WITH OTHER ORGAN INVOLVEMENT: ICD-10-CM

## 2018-07-19 DIAGNOSIS — N17.9 ACUTE RENAL FAILURE SUPERIMPOSED ON CHRONIC KIDNEY DISEASE, UNSPECIFIED CKD STAGE, UNSPECIFIED ACUTE RENAL FAILURE TYPE: ICD-10-CM

## 2018-07-19 DIAGNOSIS — S37.009A INJURY OF KIDNEY, UNSPECIFIED LATERALITY, INITIAL ENCOUNTER: ICD-10-CM

## 2018-07-19 DIAGNOSIS — D64.9 ANEMIA, UNSPECIFIED TYPE: ICD-10-CM

## 2018-07-19 DIAGNOSIS — R06.02 SHORTNESS OF BREATH: ICD-10-CM

## 2018-07-19 DIAGNOSIS — M32.9 SYSTEMIC LUPUS ERYTHEMATOSUS, UNSPECIFIED SLE TYPE, UNSPECIFIED ORGAN INVOLVEMENT STATUS: ICD-10-CM

## 2018-07-19 DIAGNOSIS — N18.4 CKD (CHRONIC KIDNEY DISEASE), STAGE IV: ICD-10-CM

## 2018-07-19 DIAGNOSIS — N30.00 ACUTE CYSTITIS WITHOUT HEMATURIA: ICD-10-CM

## 2018-07-19 DIAGNOSIS — M32.14 LUPUS NEPHRITIS: ICD-10-CM

## 2018-07-19 DIAGNOSIS — N18.9 ACUTE RENAL FAILURE SUPERIMPOSED ON CHRONIC KIDNEY DISEASE, UNSPECIFIED CKD STAGE, UNSPECIFIED ACUTE RENAL FAILURE TYPE: ICD-10-CM

## 2018-07-19 DIAGNOSIS — D59.9 ACQUIRED HEMOLYTIC ANEMIA: ICD-10-CM

## 2018-07-19 DIAGNOSIS — N17.9 AKI (ACUTE KIDNEY INJURY): Primary | ICD-10-CM

## 2018-07-19 PROBLEM — K64.9 BLEEDING HEMORRHOID: Status: ACTIVE | Noted: 2018-07-19

## 2018-07-19 LAB
ABO + RH BLD: NORMAL
ABO + RH BLD: NORMAL
ALBUMIN SERPL BCP-MCNC: 4 G/DL
ALP SERPL-CCNC: 56 U/L
ALT SERPL W/O P-5'-P-CCNC: 10 U/L
ANION GAP SERPL CALC-SCNC: 10 MMOL/L
AST SERPL-CCNC: 22 U/L
BACTERIA #/AREA URNS AUTO: ABNORMAL /HPF
BASOPHILS # BLD AUTO: 0.02 K/UL
BASOPHILS NFR BLD: 0.3 %
BILIRUB SERPL-MCNC: 0.9 MG/DL
BILIRUB UR QL STRIP: NEGATIVE
BLD GP AB SCN CELLS X3 SERPL QL: NORMAL
BLD GP AB SCN CELLS X3 SERPL QL: NORMAL
BNP SERPL-MCNC: 249 PG/ML
BUN SERPL-MCNC: 60 MG/DL
C3 SERPL-MCNC: 67 MG/DL
C4 SERPL-MCNC: 27 MG/DL
CALCIUM SERPL-MCNC: 9.1 MG/DL
CHLORIDE SERPL-SCNC: 114 MMOL/L
CLARITY UR REFRACT.AUTO: ABNORMAL
CO2 SERPL-SCNC: 15 MMOL/L
COLOR UR AUTO: YELLOW
CREAT SERPL-MCNC: 5.5 MG/DL
CREAT UR-MCNC: 103 MG/DL
CREAT UR-MCNC: 103 MG/DL
CRP SERPL-MCNC: 6.48 MG/L
DAT IGG-SP REAG RBC-IMP: NORMAL
DIFFERENTIAL METHOD: ABNORMAL
EOSINOPHIL # BLD AUTO: 0.1 K/UL
EOSINOPHIL NFR BLD: 2.3 %
ERYTHROCYTE [DISTWIDTH] IN BLOOD BY AUTOMATED COUNT: 19.6 %
ERYTHROCYTE [SEDIMENTATION RATE] IN BLOOD BY WESTERGREN METHOD: >150 MM/HR
EST. GFR  (AFRICAN AMERICAN): 9.3 ML/MIN/1.73 M^2
EST. GFR  (NON AFRICAN AMERICAN): 8.1 ML/MIN/1.73 M^2
FERRITIN SERPL-MCNC: 1215 NG/ML
FOLATE SERPL-MCNC: 6.8 NG/ML
GLUCOSE SERPL-MCNC: 104 MG/DL
GLUCOSE UR QL STRIP: NEGATIVE
HAPTOGLOB SERPL-MCNC: <10 MG/DL
HCT VFR BLD AUTO: 17.7 %
HGB BLD-MCNC: 5.6 G/DL
HGB BLD-MCNC: 6.2 G/DL
HGB UR QL STRIP: ABNORMAL
HYALINE CASTS UR QL AUTO: 0 /LPF
IMM GRANULOCYTES # BLD AUTO: 0.1 K/UL
IMM GRANULOCYTES NFR BLD AUTO: 1.6 %
IRON SERPL-MCNC: 111 UG/DL
KETONES UR QL STRIP: NEGATIVE
LDH SERPL L TO P-CCNC: 292 U/L
LEUKOCYTE ESTERASE UR QL STRIP: ABNORMAL
LYMPHOCYTES # BLD AUTO: 1.2 K/UL
LYMPHOCYTES NFR BLD: 19 %
MCH RBC QN AUTO: 33.9 PG
MCHC RBC AUTO-ENTMCNC: 35 G/DL
MCV RBC AUTO: 97 FL
MICROSCOPIC COMMENT: ABNORMAL
MONOCYTES # BLD AUTO: 0.8 K/UL
MONOCYTES NFR BLD: 13.5 %
NEUTROPHILS # BLD AUTO: 3.9 K/UL
NEUTROPHILS NFR BLD: 63.3 %
NITRITE UR QL STRIP: POSITIVE
NRBC BLD-RTO: 0 /100 WBC
PH UR STRIP: 5 [PH] (ref 5–8)
PLATELET # BLD AUTO: 344 K/UL
PMV BLD AUTO: 9.9 FL
POTASSIUM SERPL-SCNC: 4.5 MMOL/L
PROT SERPL-MCNC: 7.9 G/DL
PROT UR QL STRIP: ABNORMAL
PROT UR-MCNC: 63 MG/DL
PROT/CREAT RATIO, UR: 0.61
RBC # BLD AUTO: 1.83 M/UL
RBC #/AREA URNS AUTO: 12 /HPF (ref 0–4)
RETICS/RBC NFR AUTO: 3.9 %
SATURATED IRON: 35 %
SODIUM SERPL-SCNC: 139 MMOL/L
SODIUM UR-SCNC: 39 MMOL/L
SP GR UR STRIP: 1.01 (ref 1–1.03)
SQUAMOUS #/AREA URNS AUTO: 1 /HPF
TOTAL IRON BINDING CAPACITY: 317 UG/DL
TRANSFERRIN SERPL-MCNC: 214 MG/DL
TROPONIN I SERPL DL<=0.01 NG/ML-MCNC: <0.006 NG/ML
URN SPEC COLLECT METH UR: ABNORMAL
UROBILINOGEN UR STRIP-ACNC: NEGATIVE EU/DL
VIT B12 SERPL-MCNC: 499 PG/ML
WBC # BLD AUTO: 6.16 K/UL
WBC #/AREA URNS AUTO: >100 /HPF (ref 0–5)
WBC CLUMPS UR QL AUTO: ABNORMAL

## 2018-07-19 PROCEDURE — 86978 RBC PRETREATMENT SERUM: CPT

## 2018-07-19 PROCEDURE — 86850 RBC ANTIBODY SCREEN: CPT

## 2018-07-19 PROCEDURE — 11000001 HC ACUTE MED/SURG PRIVATE ROOM

## 2018-07-19 PROCEDURE — 86077 PHYS BLOOD BANK SERV XMATCH: CPT | Mod: ,,, | Performed by: PATHOLOGY

## 2018-07-19 PROCEDURE — 81001 URINALYSIS AUTO W/SCOPE: CPT

## 2018-07-19 PROCEDURE — 25000003 PHARM REV CODE 250

## 2018-07-19 PROCEDURE — 86141 C-REACTIVE PROTEIN HS: CPT

## 2018-07-19 PROCEDURE — 86160 COMPLEMENT ANTIGEN: CPT | Mod: 59

## 2018-07-19 PROCEDURE — 63600175 PHARM REV CODE 636 W HCPCS

## 2018-07-19 PROCEDURE — 83880 ASSAY OF NATRIURETIC PEPTIDE: CPT

## 2018-07-19 PROCEDURE — 36415 COLL VENOUS BLD VENIPUNCTURE: CPT

## 2018-07-19 PROCEDURE — 82728 ASSAY OF FERRITIN: CPT

## 2018-07-19 PROCEDURE — 83615 LACTATE (LD) (LDH) ENZYME: CPT

## 2018-07-19 PROCEDURE — 36430 TRANSFUSION BLD/BLD COMPNT: CPT

## 2018-07-19 PROCEDURE — 83010 ASSAY OF HAPTOGLOBIN QUANT: CPT

## 2018-07-19 PROCEDURE — 85018 HEMOGLOBIN: CPT

## 2018-07-19 PROCEDURE — 86905 BLOOD TYPING RBC ANTIGENS: CPT

## 2018-07-19 PROCEDURE — 83540 ASSAY OF IRON: CPT

## 2018-07-19 PROCEDURE — 85045 AUTOMATED RETICULOCYTE COUNT: CPT

## 2018-07-19 PROCEDURE — 93010 ELECTROCARDIOGRAM REPORT: CPT | Mod: ,,, | Performed by: INTERNAL MEDICINE

## 2018-07-19 PROCEDURE — 25000003 PHARM REV CODE 250: Performed by: HOSPITALIST

## 2018-07-19 PROCEDURE — 87088 URINE BACTERIA CULTURE: CPT

## 2018-07-19 PROCEDURE — 82746 ASSAY OF FOLIC ACID SERUM: CPT

## 2018-07-19 PROCEDURE — 99223 1ST HOSP IP/OBS HIGH 75: CPT | Mod: ,,, | Performed by: HOSPITALIST

## 2018-07-19 PROCEDURE — 86160 COMPLEMENT ANTIGEN: CPT

## 2018-07-19 PROCEDURE — 96360 HYDRATION IV INFUSION INIT: CPT

## 2018-07-19 PROCEDURE — 84300 ASSAY OF URINE SODIUM: CPT

## 2018-07-19 PROCEDURE — 80053 COMPREHEN METABOLIC PANEL: CPT

## 2018-07-19 PROCEDURE — 87077 CULTURE AEROBIC IDENTIFY: CPT

## 2018-07-19 PROCEDURE — 87186 SC STD MICRODIL/AGAR DIL: CPT

## 2018-07-19 PROCEDURE — 85025 COMPLETE CBC W/AUTO DIFF WBC: CPT

## 2018-07-19 PROCEDURE — P9021 RED BLOOD CELLS UNIT: HCPCS

## 2018-07-19 PROCEDURE — 87086 URINE CULTURE/COLONY COUNT: CPT

## 2018-07-19 PROCEDURE — 86860 RBC ANTIBODY ELUTION: CPT

## 2018-07-19 PROCEDURE — 99285 EMERGENCY DEPT VISIT HI MDM: CPT | Mod: ,,,

## 2018-07-19 PROCEDURE — 86922 COMPATIBILITY TEST ANTIGLOB: CPT

## 2018-07-19 PROCEDURE — 86870 RBC ANTIBODY IDENTIFICATION: CPT

## 2018-07-19 PROCEDURE — 84484 ASSAY OF TROPONIN QUANT: CPT

## 2018-07-19 PROCEDURE — 82607 VITAMIN B-12: CPT

## 2018-07-19 PROCEDURE — 63600175 PHARM REV CODE 636 W HCPCS: Performed by: HOSPITALIST

## 2018-07-19 PROCEDURE — 86880 COOMBS TEST DIRECT: CPT

## 2018-07-19 PROCEDURE — 82570 ASSAY OF URINE CREATININE: CPT

## 2018-07-19 PROCEDURE — 99285 EMERGENCY DEPT VISIT HI MDM: CPT | Mod: 25

## 2018-07-19 PROCEDURE — 86225 DNA ANTIBODY NATIVE: CPT

## 2018-07-19 PROCEDURE — 85651 RBC SED RATE NONAUTOMATED: CPT

## 2018-07-19 PROCEDURE — 86901 BLOOD TYPING SEROLOGIC RH(D): CPT | Mod: 91

## 2018-07-19 RX ORDER — ACETAMINOPHEN 325 MG/1
650 TABLET ORAL EVERY 4 HOURS PRN
Status: DISCONTINUED | OUTPATIENT
Start: 2018-07-19 | End: 2018-07-24 | Stop reason: HOSPADM

## 2018-07-19 RX ORDER — HYDROCODONE BITARTRATE AND ACETAMINOPHEN 5; 325 MG/1; MG/1
1 TABLET ORAL EVERY 4 HOURS PRN
Status: DISCONTINUED | OUTPATIENT
Start: 2018-07-19 | End: 2018-07-24 | Stop reason: HOSPADM

## 2018-07-19 RX ORDER — METHYLPREDNISOLONE SOD SUCC 125 MG
125 VIAL (EA) INJECTION ONCE
Status: COMPLETED | OUTPATIENT
Start: 2018-07-19 | End: 2018-07-19

## 2018-07-19 RX ORDER — MYCOPHENOLATE MOFETIL 250 MG/1
500 CAPSULE ORAL NIGHTLY
Status: DISCONTINUED | OUTPATIENT
Start: 2018-07-19 | End: 2018-07-24 | Stop reason: HOSPADM

## 2018-07-19 RX ORDER — HYDROCODONE BITARTRATE AND ACETAMINOPHEN 500; 5 MG/1; MG/1
TABLET ORAL
Status: DISCONTINUED | OUTPATIENT
Start: 2018-07-19 | End: 2018-07-24 | Stop reason: HOSPADM

## 2018-07-19 RX ORDER — SODIUM CHLORIDE 0.9 % (FLUSH) 0.9 %
3 SYRINGE (ML) INJECTION EVERY 6 HOURS PRN
Status: DISCONTINUED | OUTPATIENT
Start: 2018-07-19 | End: 2018-07-24 | Stop reason: HOSPADM

## 2018-07-19 RX ORDER — OXYCODONE HYDROCHLORIDE 5 MG/1
15 TABLET ORAL EVERY 4 HOURS PRN
Status: DISCONTINUED | OUTPATIENT
Start: 2018-07-19 | End: 2018-07-24 | Stop reason: HOSPADM

## 2018-07-19 RX ORDER — HYDROCORTISONE 25 MG/G
CREAM TOPICAL 2 TIMES DAILY
Status: DISCONTINUED | OUTPATIENT
Start: 2018-07-19 | End: 2018-07-24 | Stop reason: HOSPADM

## 2018-07-19 RX ORDER — ONDANSETRON 2 MG/ML
4 INJECTION INTRAMUSCULAR; INTRAVENOUS EVERY 8 HOURS PRN
Status: DISCONTINUED | OUTPATIENT
Start: 2018-07-19 | End: 2018-07-24 | Stop reason: HOSPADM

## 2018-07-19 RX ADMIN — HYDROCODONE BITARTRATE AND ACETAMINOPHEN 1 TABLET: 5; 325 TABLET ORAL at 06:07

## 2018-07-19 RX ADMIN — HYDROCORTISONE 2.5%: 25 CREAM TOPICAL at 08:07

## 2018-07-19 RX ADMIN — ONDANSETRON 4 MG: 2 INJECTION INTRAMUSCULAR; INTRAVENOUS at 09:07

## 2018-07-19 RX ADMIN — MYCOPHENOLATE MOFETIL 500 MG: 250 CAPSULE ORAL at 08:07

## 2018-07-19 RX ADMIN — METHYLPREDNISOLONE SODIUM SUCCINATE 125 MG: 125 INJECTION, POWDER, FOR SOLUTION INTRAMUSCULAR; INTRAVENOUS at 06:07

## 2018-07-19 RX ADMIN — HYDROCODONE BITARTRATE AND ACETAMINOPHEN 1 TABLET: 5; 325 TABLET ORAL at 01:07

## 2018-07-19 RX ADMIN — SODIUM CHLORIDE 500 ML: 0.9 INJECTION, SOLUTION INTRAVENOUS at 10:07

## 2018-07-19 RX ADMIN — OXYCODONE HYDROCHLORIDE 15 MG: 5 TABLET ORAL at 08:07

## 2018-07-19 NOTE — MEDICAL/APP STUDENT
History     Chief Complaint   Patient presents with    Multiple Medical Complaints     C/o SOB and dizziness that began this AM. Also c/o painful hemmorhoids x 2 weeks, left wrist pain, and sharp pain through back. HX lupus     This is a 53-year-old female with a PMHx of Lupus, DVT (, not on anticoagulants), and anemia who presents to the ED with SOB. This morning at 4:30 AM, she woke with SOB, fatigue, generalized weakness, and lightheadedness, which has been persistent since. She reports SOB at rest and with exertion. She denies having access to an inhaler/nebulizer at home. She denies a sedentary lifestyle, HRT, or recent long travel. She also has had hemorrhoid pain, mild upper abdominal pain, diarrhea, and decreased appetite for the past 2 weeks. Her last received a blood transfusion 6 years ago, shortly after being diagnosed with a DVT. She denies blood in her stool, melena, fevers, chills, chest pain, headache, dizziness, or any other medical complaints.       The history is provided by the patient, the spouse and a relative.       Past Medical History:   Diagnosis Date    Acid reflux     Allergy     Alopecia     Anemia     Anxiety     Arthritis     Blood transfusion     Chronic kidney disease     Clotting disorder     Deep vein thrombosis     left leg    Dry eyes     Dry mouth     Lupus        Past Surgical History:   Procedure Laterality Date     SECTION      TUBAL LIGATION         Family History   Problem Relation Age of Onset    Colon cancer Maternal Aunt     Colon cancer Maternal Grandfather     Rheum arthritis Mother     Heart disease Mother     Hypertension Sister     Diabetes Mellitus Sister     Diabetes Mellitus Sister     Lupus Neg Hx     Psoriasis Neg Hx        Social History   Substance Use Topics    Smoking status: Never Smoker    Smokeless tobacco: Never Used    Alcohol use No       Review of Systems    Physical Exam   BP (!) 148/70 (BP Location: Left  "arm, Patient Position: Sitting)   Pulse 64   Temp 98.4 °F (36.9 °C) (Oral)   Resp 20   Ht 5' 5" (1.651 m)   Wt 81.6 kg (180 lb)   LMP 05/17/2010   SpO2 99%   Breastfeeding? No   BMI 29.95 kg/m²     Physical Exam    ED Course         "

## 2018-07-19 NOTE — NURSING
Pt transferred to room 927A.  VSS on room air.  Pt transferred with assist to bed.  Family at bedside.  Pt oriented to room and unit.

## 2018-07-19 NOTE — SUBJECTIVE & OBJECTIVE
Past Medical History:   Diagnosis Date    Acid reflux     Alopecia     Anemia     Anxiety     Arthritis     Blood transfusion     Chronic kidney disease     Deep vein thrombosis 2012    left leg; completed anticoagulation; seen by heme/onc    Dry eyes     Dry mouth     Lupus        Past Surgical History:   Procedure Laterality Date     SECTION      SKIN SURGERY      to frontal head    TUBAL LIGATION         Review of patient's allergies indicates:   Allergen Reactions    Ciprofloxacin Other (See Comments)     Face and lips became swollen and throat closed    Avelox [moxifloxacin] Rash    Iodinated contrast- oral and iv dye Itching and Swelling    Reglan [metoclopramide hcl] Other (See Comments)     Nervous and paranoid    Amoxicillin      Other reaction(s): Unknown       No current facility-administered medications on file prior to encounter.      Current Outpatient Prescriptions on File Prior to Encounter   Medication Sig    mycophenolate (CELLCEPT) 500 mg Tab Take 1 tablet (500 mg total) by mouth 2 (two) times daily.    [DISCONTINUED] acyclovir (ZOVIRAX) 800 MG Tab Take 1 tablet (800 mg total) by mouth 2 (two) times daily.    [DISCONTINUED] estradiol 10 mcg Tab Place 1 tablet (10 mcg total) vaginally twice a week.    [DISCONTINUED] losartan (COZAAR) 100 MG tablet Take 100 mg by mouth once daily.    [DISCONTINUED] losartan (COZAAR) 25 MG tablet Take 100 mg by mouth once daily.     [DISCONTINUED] potassium 99 mg Tab Take by mouth once.     Family History     Problem Relation (Age of Onset)    Colon cancer Maternal Aunt, Maternal Grandfather    Diabetes Sister    Diabetes Mellitus Sister, Sister    Heart disease Mother    Hypertension Sister    Kidney disease Mother, Sister    Rheum arthritis Mother        Social History Main Topics    Smoking status: Never Smoker    Smokeless tobacco: Never Used    Alcohol use No    Drug use: No    Sexual activity: Not Currently     Review of  Systems   Constitutional: Positive for activity change and fatigue. Negative for fever.   HENT: Negative for congestion.    Eyes: Negative for discharge.   Respiratory: Positive for shortness of breath. Negative for cough and chest tightness.    Cardiovascular: Negative for chest pain and leg swelling.   Gastrointestinal: Negative for abdominal distention and abdominal pain.   Endocrine: Negative for cold intolerance.   Genitourinary: Negative for difficulty urinating.   Musculoskeletal: Negative for arthralgias.   Skin: Negative for rash.        Hair loss   Allergic/Immunologic: Negative for environmental allergies.   Neurological: Positive for light-headedness.   Hematological: Negative for adenopathy.   Psychiatric/Behavioral: Negative for behavioral problems.     Objective:     Vital Signs (Most Recent):  Temp: 97.6 °F (36.4 °C) (07/19/18 1535)  Pulse: 69 (07/19/18 1535)  Resp: 20 (07/19/18 1535)  BP: (!) 124/59 (07/19/18 1535)  SpO2: 100 % (07/19/18 1535) Vital Signs (24h Range):  Temp:  [96.3 °F (35.7 °C)-98.4 °F (36.9 °C)] 97.6 °F (36.4 °C)  Pulse:  [64-81] 69  Resp:  [18-20] 20  SpO2:  [99 %-100 %] 100 %  BP: (124-148)/(59-70) 124/59     Weight: 78.7 kg (173 lb 9.6 oz)  Body mass index is 28.89 kg/m².    Physical Exam   Constitutional: She is oriented to person, place, and time. No distress.   HENT:   Head: Normocephalic and atraumatic.   Eyes: Left eye exhibits no discharge. No scleral icterus.   Neck: Neck supple. No JVD present.   Cardiovascular: Normal rate, regular rhythm and normal heart sounds.    Pulmonary/Chest: Effort normal and breath sounds normal.   Abdominal: Soft. Bowel sounds are normal.   Musculoskeletal: Normal range of motion. She exhibits no edema.   Neurological: She is alert and oriented to person, place, and time.   Skin: Skin is warm and dry.   Psychiatric: She has a normal mood and affect. Her behavior is normal.           Significant Labs: All pertinent labs within the past 24 hours  have been reviewed.    Significant Imaging: I have reviewed and interpreted all pertinent imaging results/findings within the past 24 hours.

## 2018-07-19 NOTE — ASSESSMENT & PLAN NOTE
Pt denies seeing any blood at home so I do not think this is the cause of her anemia.  -- topical steroids for hemorrhoidal pain

## 2018-07-19 NOTE — ED PROVIDER NOTES
Encounter Date: 2018       History     Chief Complaint   Patient presents with    Multiple Medical Complaints     C/o SOB and dizziness that began this AM. Also c/o painful hemmorhoids x 2 weeks, left wrist pain, and sharp pain through back. HX lupus     HPI  Review of patient's allergies indicates:   Allergen Reactions    Ciprofloxacin Other (See Comments)     Face and lips became swollen and throat closed    Avelox [moxifloxacin] Rash    Iodinated contrast- oral and iv dye Itching and Swelling    Reglan [metoclopramide hcl] Other (See Comments)     Nervous and paranoid    Amoxicillin      Other reaction(s): Unknown     Past Medical History:   Diagnosis Date    Acid reflux     Allergy     Alopecia     Anemia     Anxiety     Arthritis     Blood transfusion     Chronic kidney disease     Clotting disorder     Deep vein thrombosis     left leg    Dry eyes     Dry mouth     Lupus      Past Surgical History:   Procedure Laterality Date     SECTION      TUBAL LIGATION       Family History   Problem Relation Age of Onset    Colon cancer Maternal Aunt     Colon cancer Maternal Grandfather     Rheum arthritis Mother     Heart disease Mother     Hypertension Sister     Diabetes Mellitus Sister     Diabetes Mellitus Sister     Lupus Neg Hx     Psoriasis Neg Hx      Social History   Substance Use Topics    Smoking status: Never Smoker    Smokeless tobacco: Never Used    Alcohol use No     Review of Systems    Physical Exam     Initial Vitals [18 0732]   BP Pulse Resp Temp SpO2   136/63 81 18 98.4 °F (36.9 °C) 100 %      MAP       --         Physical Exam    ED Course   Procedures  Labs Reviewed   CBC W/ AUTO DIFFERENTIAL - Abnormal; Notable for the following:        Result Value    RBC 1.83 (*)     Hemoglobin 6.2 (*)     Hematocrit 17.7 (*)     MCH 33.9 (*)     RDW 19.6 (*)     Immature Granulocytes 1.6 (*)     Immature Grans (Abs) 0.10 (*)     All other components within  normal limits    Narrative:     lavender shared tube  07/19/2018  09:19     H&H critical result(s) called and verbal readback obtained from   NURSE YUNIER, 07/19/2018 09:33   COMPREHENSIVE METABOLIC PANEL - Abnormal; Notable for the following:     Chloride 114 (*)     CO2 15 (*)     BUN, Bld 60 (*)     Creatinine 5.5 (*)     eGFR if  9.3 (*)     eGFR if non  8.1 (*)     All other components within normal limits    Narrative:     lavender shared tube  07/19/2018  09:19    B-TYPE NATRIURETIC PEPTIDE - Abnormal; Notable for the following:      (*)     All other components within normal limits    Narrative:     lavender shared tube  07/19/2018  09:19    CULTURE, URINE   TROPONIN I    Narrative:     lavender shared tube  07/19/2018  09:19    URINALYSIS   TYPE & SCREEN   ANTIBODY IDENTIFICATION          Imaging Results          X-Ray Chest PA And Lateral (Final result)  Result time 07/19/18 09:52:32    Final result by Buddy John MD (07/19/18 09:52:32)                 Impression:      See above      Electronically signed by: Buddy John MD  Date:    07/19/2018  Time:    09:52             Narrative:    EXAMINATION:  XR CHEST PA AND LATERAL    CLINICAL HISTORY:  dyspnea;    TECHNIQUE:  PA and lateral views of the chest were performed.    COMPARISON:  11/07/2014    FINDINGS:  Cardiac size is normal lungs are clear and no infiltrate is seen.                                                      Clinical Impression:   The primary encounter diagnosis was Anemia, unspecified type. Diagnoses of Shortness of breath, Injury of kidney, unspecified laterality, initial encounter, and Acute renal failure superimposed on chronic kidney disease, unspecified CKD stage, unspecified acute renal failure type were also pertinent to this visit.

## 2018-07-19 NOTE — ED NOTES
LOC: The patient is awake, alert, and oriented to place, time, situation. Affect is appropriate.  Speech is appropriate and clear.     APPEARANCE: Patient resting comfortably in no acute distress.  Patient is clean and well groomed.    SKIN: The skin is warm and dry; color consistent with ethnicity.  Patient has normal skin turgor and moist mucus membranes.  Skin intact; no breakdown or bruising noted.     MUSCULOSKELETAL: Patient moving upper and lower extremities without difficulty.  Denies weakness.     RESPIRATORY: Reporting SOB. Airway is open and patent. Respirations spontaneous, Patient has an increased  effort and rate.  No accessory muscle use noted. Denies cough.     CARDIAC:  Normal rhythm and rate noted.  No peripheral edema noted. No complaints of chest pain.      ABDOMEN: Soft and non tender to palpation.  No distention noted.     NEUROLOGIC: Eyes open spontaneously.  Behavior appropriate to situation.  Follows commands; facial expression symmetrical.  Purposeful motor response noted; normal sensation in all extremities.

## 2018-07-19 NOTE — ASSESSMENT & PLAN NOTE
Seen by heme in the past.    By history (no melena or BRBPR) this is either a hemolytic anemia or a very slow GIB.  - transfuse blood, serial Hgb today  - needs c-scope as outpt for screening too due to her age  - check hemolysis labs

## 2018-07-19 NOTE — NURSING
Spoke with blood bank regarding PRBC's. Pt has multiple antibodies in blood. May take 5-6 hours to obtain blood. Blood bank will contact nurse when blood is available.

## 2018-07-19 NOTE — ED NOTES
Report from Kaylin SHELTON. Moved to room 18. Sitting up in wheelchair, speaking full sentences, non labored breathing, tachypneic at 20 breaths/min, states feelings sob, sating 99% RA. Denies cp, n/v/d, other complaints. Pt. Placed on portable cardiac monitor. hgb 6.2 per lab. Plan to transfuse prbc and admit. No acute distress noted, will continue to monitor.

## 2018-07-19 NOTE — ASSESSMENT & PLAN NOTE
Pt with hair loss and weakness typical of her lupus flares while off her medications  -- check lupus labs and consult rheum  - resume her home celcept

## 2018-07-19 NOTE — H&P
Spoke to rheum about dropping Hgb and nil haptoglobin.  Will give steroids x1 this evening.        [facility] Hospital Medicine  History & Physical    Patient Name: Talya Bates  MRN: [id number]  Admission Date: 7/19/2018  Attending Physician: Russell Guadarrama MD   Primary Care Provider: Lee Mcnair MD    [facility] Medicine Team: [facility] HOSP MED D Russell Guadarrama MD     Patient information was obtained from patient and ER records.     Subjective:     Principal Problem:Lupus nephritis    Chief Complaint:   Chief Complaint   Patient presents with    Multiple Medical Complaints     C/o SOB and dizziness that began this AM. Also c/o painful hemmorhoids x 2 weeks, left wrist pain, and sharp pain through back. HX lupus        HPI: This is a 53-year-old female with a PMHx of Lupus, DVT (2012, not on anticoagulants), and anemia who presents to the ED with SOB. This morning at 4:30 AM, she woke with SOB, fatigue, generalized weakness, and lightheadedness, which has been persistent since. She reports SOB at rest and with exertion.     She denies a sedentary lifestyle, HRT, or recent long travel. She also has had hemorrhoid pain, mild upper abdominal pain, diarrhea, and decreased appetite for the past 2 weeks. Her last received a blood transfusion 6 years ago, shortly after being diagnosed with a DVT. She denies blood in her stool, melena, fevers, chills, chest pain, headache, dizziness, or any other medical complaints.     She ran our of her cellcept two weeks ago and is waiting on her MD to renew it.  She has had some hair loss in that time, but no joint pain or rash.  Her flares usually present with weakness such as this.    Past Medical History:   Diagnosis Date    Acid reflux     Alopecia     Anemia     Anxiety     Arthritis     Blood transfusion     Chronic kidney disease     Deep vein thrombosis 2012    left leg; completed anticoagulation; seen by heme/onc    Dry eyes     Dry  mouth     Lupus        Past Surgical History:   Procedure Laterality Date     SECTION      SKIN SURGERY      to frontal head    TUBAL LIGATION         Review of patient's allergies indicates:   Allergen Reactions    Ciprofloxacin Other (See Comments)     Face and lips became swollen and throat closed    Avelox [moxifloxacin] Rash    Iodinated contrast- oral and iv dye Itching and Swelling    Reglan [metoclopramide hcl] Other (See Comments)     Nervous and paranoid    Amoxicillin      Other reaction(s): Unknown       No current facility-administered medications on file prior to encounter.      Current Outpatient Prescriptions on File Prior to Encounter   Medication Sig    mycophenolate (CELLCEPT) 500 mg Tab Take 1 tablet (500 mg total) by mouth 2 (two) times daily.    [DISCONTINUED] acyclovir (ZOVIRAX) 800 MG Tab Take 1 tablet (800 mg total) by mouth 2 (two) times daily.    [DISCONTINUED] estradiol 10 mcg Tab Place 1 tablet (10 mcg total) vaginally twice a week.    [DISCONTINUED] losartan (COZAAR) 100 MG tablet Take 100 mg by mouth once daily.    [DISCONTINUED] losartan (COZAAR) 25 MG tablet Take 100 mg by mouth once daily.     [DISCONTINUED] potassium 99 mg Tab Take by mouth once.     Family History     Problem Relation (Age of Onset)    Colon cancer Maternal Aunt, Maternal Grandfather    Diabetes Sister    Diabetes Mellitus Sister, Sister    Heart disease Mother    Hypertension Sister    Kidney disease Mother, Sister    Rheum arthritis Mother        Social History Main Topics    Smoking status: Never Smoker    Smokeless tobacco: Never Used    Alcohol use No    Drug use: No    Sexual activity: Not Currently     Review of Systems   Constitutional: Positive for activity change and fatigue. Negative for fever.   HENT: Negative for congestion.    Eyes: Negative for discharge.   Respiratory: Positive for shortness of breath. Negative for cough and chest tightness.    Cardiovascular: Negative  for chest pain and leg swelling.   Gastrointestinal: Negative for abdominal distention and abdominal pain.   Endocrine: Negative for cold intolerance.   Genitourinary: Negative for difficulty urinating.   Musculoskeletal: Negative for arthralgias.   Skin: Negative for rash.        Hair loss   Allergic/Immunologic: Negative for environmental allergies.   Neurological: Positive for light-headedness.   Hematological: Negative for adenopathy.   Psychiatric/Behavioral: Negative for behavioral problems.     Objective:     Vital Signs (Most Recent):  Temp: 97.6 °F (36.4 °C) (07/19/18 1535)  Pulse: 69 (07/19/18 1535)  Resp: 20 (07/19/18 1535)  BP: (!) 124/59 (07/19/18 1535)  SpO2: 100 % (07/19/18 1535) Vital Signs (24h Range):  Temp:  [96.3 °F (35.7 °C)-98.4 °F (36.9 °C)] 97.6 °F (36.4 °C)  Pulse:  [64-81] 69  Resp:  [18-20] 20  SpO2:  [99 %-100 %] 100 %  BP: (124-148)/(59-70) 124/59     Weight: 78.7 kg (173 lb 9.6 oz)  Body mass index is 28.89 kg/m².    Physical Exam   Constitutional: She is oriented to person, place, and time. No distress.   HENT:   Head: Normocephalic and atraumatic.   Eyes: Left eye exhibits no discharge. No scleral icterus.   Neck: Neck supple. No JVD present.   Cardiovascular: Normal rate, regular rhythm and normal heart sounds.    Pulmonary/Chest: Effort normal and breath sounds normal.   Abdominal: Soft. Bowel sounds are normal.   Musculoskeletal: Normal range of motion. She exhibits no edema.   Neurological: She is alert and oriented to person, place, and time.   Skin: Skin is warm and dry.   Psychiatric: She has a normal mood and affect. Her behavior is normal.           Significant Labs: All pertinent labs within the past 24 hours have been reviewed.    Significant Imaging: I have reviewed and interpreted all pertinent imaging results/findings within the past 24 hours.    Assessment/Plan:     * Lupus nephritis    CKD (chronic kidney disease), stage IV with MARKEL  Baseline Cr of 2.5 now up to  5.5.  - renal US  - urine lytes and UA  - renal consult given lupus, low bicarb,and high BUN          Bleeding hemorrhoid    Pt denies seeing any blood at home so I do not think this is the cause of her anemia.  -- topical steroids for hemorrhoidal pain        Lupus (systemic lupus erythematosus)    Pt with hair loss and weakness typical of her lupus flares while off her medications  -- check lupus labs and consult rheum  - resume her home celcept          Hemolytic anemia    Seen by heme in the past.    By history (no melena or BRBPR) this is either a hemolytic anemia or a very slow GIB.  - transfuse blood, serial Hgb today  - needs c-scope as outpt for screening too due to her age  - check hemolysis labs            VTE Risk Mitigation         Ordered     IP VTE HIGH RISK PATIENT  Once      07/19/18 1250     Place AZUL hose  Until discontinued      07/19/18 1250     Place AZUL hose  Until discontinued      07/19/18 1138     Place sequential compression device  Until discontinued      07/19/18 1138     Reason for No Pharmacological VTE Prophylaxis  Once      07/19/18 1138             Russell Guadarrama MD  Department of Hospital Medicine   [facility]

## 2018-07-19 NOTE — NURSING
Called and informed MD of delay in PRBC due to blood not being ready yet. Pt has multiple antibodies per blood bank. Pt VSS, O2 sat 100%.

## 2018-07-19 NOTE — ED PROVIDER NOTES
Encounter Date: 2018       History     Chief Complaint   Patient presents with    Multiple Medical Complaints     C/o SOB and dizziness that began this AM. Also c/o painful hemmorhoids x 2 weeks, left wrist pain, and sharp pain through back. HX lupus     This is a 53-year-old female with a PMHx of Lupus, DVT (, not on anticoagulants), and anemia who presents to the ED with SOB. This morning at 4:30 AM, she woke with SOB, fatigue, generalized weakness, and lightheadedness, which has been persistent since. She reports SOB at rest and with exertion. She denies having access to an inhaler/nebulizer at home. She denies a sedentary lifestyle, HRT, or recent long travel. She also has had hemorrhoid pain, mild upper abdominal pain, diarrhea, and decreased appetite for the past 2 weeks. Her last received a blood transfusion 6 years ago, shortly after being diagnosed with a DVT. She denies blood in her stool, melena, fevers, chills, chest pain, headache, dizziness, or any other medical complaints.           Review of patient's allergies indicates:   Allergen Reactions    Ciprofloxacin Other (See Comments)     Face and lips became swollen and throat closed    Avelox [moxifloxacin] Rash    Iodinated contrast- oral and iv dye Itching and Swelling    Reglan [metoclopramide hcl] Other (See Comments)     Nervous and paranoid    Amoxicillin      Other reaction(s): Unknown     Past Medical History:   Diagnosis Date    Acid reflux     Allergy     Alopecia     Anemia     Anxiety     Arthritis     Blood transfusion     Chronic kidney disease     Clotting disorder     Deep vein thrombosis     left leg    Dry eyes     Dry mouth     Lupus      Past Surgical History:   Procedure Laterality Date     SECTION      TUBAL LIGATION       Family History   Problem Relation Age of Onset    Colon cancer Maternal Aunt     Colon cancer Maternal Grandfather     Rheum arthritis Mother     Heart disease Mother      Hypertension Sister     Diabetes Mellitus Sister     Diabetes Mellitus Sister     Lupus Neg Hx     Psoriasis Neg Hx      Social History   Substance Use Topics    Smoking status: Never Smoker    Smokeless tobacco: Never Used    Alcohol use No     Review of Systems   Constitutional: Positive for activity change, appetite change and fatigue. Negative for chills, diaphoresis and fever.   HENT: Negative for sore throat.    Eyes: Negative for visual disturbance.   Respiratory: Positive for shortness of breath. Negative for cough.    Cardiovascular: Negative for chest pain, palpitations and leg swelling.   Gastrointestinal: Negative for abdominal pain, nausea and vomiting.   Genitourinary: Negative for dysuria and flank pain.   Musculoskeletal: Negative for back pain and myalgias.   Skin: Negative for rash.   Neurological: Positive for weakness and light-headedness. Negative for syncope and headaches.   Hematological: Does not bruise/bleed easily.   Psychiatric/Behavioral: The patient is not nervous/anxious.        Physical Exam     Initial Vitals [07/19/18 0732]   BP Pulse Resp Temp SpO2   136/63 81 18 98.4 °F (36.9 °C) 100 %      MAP       --         Physical Exam    Vitals reviewed.  Constitutional: She is not diaphoretic. She appears ill. No distress.   HENT:   Head: Normocephalic and atraumatic.   Mouth/Throat: Oropharynx is clear and moist.   Eyes: EOM are normal. Pupils are equal, round, and reactive to light. Right eye exhibits no discharge. Left eye exhibits no discharge.   Membranes pale   Neck: Normal range of motion. Neck supple.   Cardiovascular: Normal rate, regular rhythm and intact distal pulses.   Pulmonary/Chest: Breath sounds normal. She has no wheezes. She has no rhonchi. She has no rales.   Abdominal: Soft. Bowel sounds are normal. There is no tenderness. There is no rebound and no guarding.   Genitourinary: Rectal exam shows guaiac positive stool. Guaiac positive stool. :  Acceptable.  Genitourinary Comments: Medium brown stool with gross blood  External and internal hemorrhoids present, no bleeding noted to external hemorrhoids   Musculoskeletal: She exhibits no edema.   Neurological: She is alert and oriented to person, place, and time. No sensory deficit.   Skin: Skin is warm. Capillary refill takes less than 2 seconds. No rash noted.         ED Course   Procedures  Labs Reviewed   CBC W/ AUTO DIFFERENTIAL - Abnormal; Notable for the following:        Result Value    RBC 1.83 (*)     Hemoglobin 6.2 (*)     Hematocrit 17.7 (*)     MCH 33.9 (*)     RDW 19.6 (*)     Immature Granulocytes 1.6 (*)     Immature Grans (Abs) 0.10 (*)     All other components within normal limits    Narrative:     lavender shared tube  07/19/2018  09:19     H&H critical result(s) called and verbal readback obtained from   NURSE YUNIER, 07/19/2018 09:33   COMPREHENSIVE METABOLIC PANEL - Abnormal; Notable for the following:     Chloride 114 (*)     CO2 15 (*)     BUN, Bld 60 (*)     Creatinine 5.5 (*)     eGFR if  9.3 (*)     eGFR if non  8.1 (*)     All other components within normal limits    Narrative:     lavender shared tube  07/19/2018  09:19    B-TYPE NATRIURETIC PEPTIDE - Abnormal; Notable for the following:      (*)     All other components within normal limits    Narrative:     lavender shared tube  07/19/2018  09:19    CULTURE, URINE   TROPONIN I    Narrative:     lavender shared tube  07/19/2018  09:19    URINALYSIS   TYPE & SCREEN   ANTIBODY IDENTIFICATION          Imaging Results          X-Ray Chest PA And Lateral (Final result)  Result time 07/19/18 09:52:32    Final result by Buddy John MD (07/19/18 09:52:32)                 Impression:      See above      Electronically signed by: Buddy John MD  Date:    07/19/2018  Time:    09:52             Narrative:    EXAMINATION:  XR CHEST PA AND LATERAL    CLINICAL  HISTORY:  dyspnea;    TECHNIQUE:  PA and lateral views of the chest were performed.    COMPARISON:  11/07/2014    FINDINGS:  Cardiac size is normal lungs are clear and no infiltrate is seen.                                 Medical Decision Making:   History:   Old Medical Records: I decided to obtain old medical records.  Old Records Summarized: records from clinic visits.  Initial Assessment:   This is a 53-year-old female with a PMHx of Lupus, DVT (2012, not on anticoagulants), and anemia who presents to the ED with SOB, generalized weakness onset 4:30a. Denies melena. shortness of breath exacerbated with activity; becomes tachypnic and tachycardic with ambulation. On exam, lungs CTAB, Cardiac RRR, abdomen is soft, non tender, non distended. Mucous membranes are pale. ELTON reveals medium brown stool with gross blood, external and internal hemorrhoids present. Blood could possibly be from internal hemorrhoids, external were not bleeding.  Differential Diagnosis:   DDX includes but is not limited to lupus nephritis, MARKEL, electrolyte abnormality, anemia, GI bleed, ACS, arrhythmia, pneumonia. Considered but do not suspect PE.   Clinical Tests:   Lab Tests: Ordered and Reviewed  Radiological Study: Ordered and Reviewed  Medical Tests: Ordered and Reviewed  ED Management:  Patient originally seen in intake and basic cardiac labs ordered by Saranya Juarez NP. I agree with labs.     H/H 6.2/17.7. Cr 5.5 (last Cr was 3.0 seven months ago). CXR stable. Patient consented for blood transfusion. Hesitant to transfuse in ED with decline in renal function, will defer to medicine. Will admit to inpatient team for MARKEL and anemia.     I have discussed the treatment and management of this patient with my supervisory physician, and we agree on the plan of care.                 Attending Attestation:     Physician Attestation Statement for NP/PA:   I have conducted a face to face encounter with this patient in addition to the NP/PA, due  to Medical Complexity    Other NP/PA Attestation Additions:    History of Present Illness: Generalized fatigue, dyspnea progressive over weeks.   Physical Exam: Conjunctival pallor.   Medical Decision Making: Noted Low HGB and cr signif below previous baseline of 3. Extensive discussion with pt and family re concern that kidney function may be permanently worsened and may have been causal to anemia. Will need admission for further testing and workup.                     Clinical Impression:   The primary encounter diagnosis was Anemia, unspecified type. Diagnoses of Shortness of breath, Injury of kidney, unspecified laterality, initial encounter, and Acute renal failure superimposed on chronic kidney disease, unspecified CKD stage, unspecified acute renal failure type were also pertinent to this visit.      Disposition:   Disposition: Admitted  Condition: Franca Florentino PA-C  07/19/18 7075

## 2018-07-19 NOTE — HPI
This is a 53-year-old female with a PMHx of Lupus, DVT (2012, not on anticoagulants), and anemia who presents to the ED with SOB. This morning at 4:30 AM, she woke with SOB, fatigue, generalized weakness, and lightheadedness, which has been persistent since. She reports SOB at rest and with exertion.     She denies a sedentary lifestyle, HRT, or recent long travel. She also has had hemorrhoid pain, mild upper abdominal pain, diarrhea, and decreased appetite for the past 2 weeks. Her last received a blood transfusion 6 years ago, shortly after being diagnosed with a DVT. She denies blood in her stool, melena, fevers, chills, chest pain, headache, dizziness, or any other medical complaints.     She ran our of her cellcept two weeks ago and is waiting on her MD to renew it.  She has had some hair loss in that time, but no joint pain or rash.  Her flares usually present with weakness such as this.

## 2018-07-19 NOTE — ASSESSMENT & PLAN NOTE
CKD (chronic kidney disease), stage IV with MARKEL  Baseline Cr of 2.5 now up to 5.5.  - renal US  - urine lytes and UA  - renal consult given lupus, low bicarb,and high BUN

## 2018-07-20 PROBLEM — N30.00 ACUTE CYSTITIS WITHOUT HEMATURIA: Status: ACTIVE | Noted: 2018-07-20

## 2018-07-20 PROBLEM — E87.5 HYPERKALEMIA: Status: ACTIVE | Noted: 2018-07-20

## 2018-07-20 LAB
ALBUMIN SERPL BCP-MCNC: 3.6 G/DL
ANION GAP SERPL CALC-SCNC: 8 MMOL/L
BASOPHILS # BLD AUTO: 0.01 K/UL
BASOPHILS NFR BLD: 0.1 %
BLD PROD TYP BPU: NORMAL
BLD PROD TYP BPU: NORMAL
BLOOD GROUP ANTIBODIES SERPL: NORMAL
BLOOD UNIT EXPIRATION DATE: NORMAL
BLOOD UNIT EXPIRATION DATE: NORMAL
BLOOD UNIT TYPE CODE: 6200
BLOOD UNIT TYPE CODE: 6200
BLOOD UNIT TYPE: NORMAL
BLOOD UNIT TYPE: NORMAL
BUN SERPL-MCNC: 59 MG/DL
CALCIUM SERPL-MCNC: 9.1 MG/DL
CHLORIDE SERPL-SCNC: 115 MMOL/L
CO2 SERPL-SCNC: 18 MMOL/L
CODING SYSTEM: NORMAL
CODING SYSTEM: NORMAL
CREAT SERPL-MCNC: 4.7 MG/DL
DIFFERENTIAL METHOD: ABNORMAL
DISPENSE STATUS: NORMAL
DISPENSE STATUS: NORMAL
DSDNA AB SER-ACNC: NORMAL [IU]/ML
EOSINOPHIL # BLD AUTO: 0 K/UL
EOSINOPHIL NFR BLD: 0 %
ERYTHROCYTE [DISTWIDTH] IN BLOOD BY AUTOMATED COUNT: 15.6 %
EST. GFR  (AFRICAN AMERICAN): 11.3 ML/MIN/1.73 M^2
EST. GFR  (NON AFRICAN AMERICAN): 9.8 ML/MIN/1.73 M^2
GLUCOSE SERPL-MCNC: 150 MG/DL
HCT VFR BLD AUTO: 26.1 %
HGB BLD-MCNC: 9 G/DL
HGB BLD-MCNC: 9.5 G/DL
IMM GRANULOCYTES # BLD AUTO: 0.1 K/UL
IMM GRANULOCYTES NFR BLD AUTO: 1.3 %
LYMPHOCYTES # BLD AUTO: 0.8 K/UL
LYMPHOCYTES NFR BLD: 10 %
MCH RBC QN AUTO: 31.3 PG
MCHC RBC AUTO-ENTMCNC: 34.5 G/DL
MCV RBC AUTO: 91 FL
MONOCYTES # BLD AUTO: 0.1 K/UL
MONOCYTES NFR BLD: 1.7 %
NEUTROPHILS # BLD AUTO: 6.6 K/UL
NEUTROPHILS NFR BLD: 86.9 %
NRBC BLD-RTO: 0 /100 WBC
PHOSPHATE SERPL-MCNC: 5 MG/DL
PLATELET # BLD AUTO: 327 K/UL
PMV BLD AUTO: 9.7 FL
POTASSIUM SERPL-SCNC: 5.5 MMOL/L
RBC # BLD AUTO: 2.88 M/UL
SODIUM SERPL-SCNC: 141 MMOL/L
TRANS ERYTHROCYTES VOL PATIENT: NORMAL ML
TRANS ERYTHROCYTES VOL PATIENT: NORMAL ML
WBC # BLD AUTO: 7.58 K/UL

## 2018-07-20 PROCEDURE — 86902 BLOOD TYPE ANTIGEN DONOR EA: CPT

## 2018-07-20 PROCEDURE — 36415 COLL VENOUS BLD VENIPUNCTURE: CPT

## 2018-07-20 PROCEDURE — 63600175 PHARM REV CODE 636 W HCPCS

## 2018-07-20 PROCEDURE — 11000001 HC ACUTE MED/SURG PRIVATE ROOM

## 2018-07-20 PROCEDURE — 25000003 PHARM REV CODE 250: Performed by: HOSPITALIST

## 2018-07-20 PROCEDURE — 25000003 PHARM REV CODE 250

## 2018-07-20 PROCEDURE — 63600175 PHARM REV CODE 636 W HCPCS: Performed by: HOSPITALIST

## 2018-07-20 PROCEDURE — P9021 RED BLOOD CELLS UNIT: HCPCS

## 2018-07-20 PROCEDURE — 99233 SBSQ HOSP IP/OBS HIGH 50: CPT | Mod: ,,, | Performed by: HOSPITALIST

## 2018-07-20 PROCEDURE — 99222 1ST HOSP IP/OBS MODERATE 55: CPT | Mod: ,,, | Performed by: INTERNAL MEDICINE

## 2018-07-20 PROCEDURE — 36430 TRANSFUSION BLD/BLD COMPNT: CPT

## 2018-07-20 PROCEDURE — 85025 COMPLETE CBC W/AUTO DIFF WBC: CPT

## 2018-07-20 PROCEDURE — 85018 HEMOGLOBIN: CPT

## 2018-07-20 PROCEDURE — 80069 RENAL FUNCTION PANEL: CPT

## 2018-07-20 PROCEDURE — 99233 SBSQ HOSP IP/OBS HIGH 50: CPT | Mod: ,,, | Performed by: INTERNAL MEDICINE

## 2018-07-20 RX ORDER — FOLIC ACID 1 MG/1
1 TABLET ORAL DAILY
Status: DISCONTINUED | OUTPATIENT
Start: 2018-07-20 | End: 2018-07-24 | Stop reason: HOSPADM

## 2018-07-20 RX ORDER — PANTOPRAZOLE SODIUM 40 MG/1
40 TABLET, DELAYED RELEASE ORAL DAILY
Status: DISCONTINUED | OUTPATIENT
Start: 2018-07-20 | End: 2018-07-24 | Stop reason: HOSPADM

## 2018-07-20 RX ORDER — PREDNISONE 20 MG/1
60 TABLET ORAL DAILY
Status: DISCONTINUED | OUTPATIENT
Start: 2018-07-20 | End: 2018-07-24 | Stop reason: HOSPADM

## 2018-07-20 RX ORDER — CEFTRIAXONE 1 G/1
1 INJECTION, POWDER, FOR SOLUTION INTRAMUSCULAR; INTRAVENOUS
Status: DISCONTINUED | OUTPATIENT
Start: 2018-07-20 | End: 2018-07-20

## 2018-07-20 RX ORDER — MEROPENEM 1 G/1
1 INJECTION, POWDER, FOR SOLUTION INTRAVENOUS
Status: DISCONTINUED | OUTPATIENT
Start: 2018-07-20 | End: 2018-07-20

## 2018-07-20 RX ORDER — SODIUM BICARBONATE 650 MG/1
650 TABLET ORAL 2 TIMES DAILY
Status: DISCONTINUED | OUTPATIENT
Start: 2018-07-20 | End: 2018-07-21

## 2018-07-20 RX ADMIN — PANTOPRAZOLE SODIUM 40 MG: 40 TABLET, DELAYED RELEASE ORAL at 03:07

## 2018-07-20 RX ADMIN — MYCOPHENOLATE MOFETIL 500 MG: 250 CAPSULE ORAL at 08:07

## 2018-07-20 RX ADMIN — HYDROCORTISONE 2.5%: 25 CREAM TOPICAL at 09:07

## 2018-07-20 RX ADMIN — PREDNISONE 60 MG: 20 TABLET ORAL at 02:07

## 2018-07-20 RX ADMIN — HYDROCODONE BITARTRATE AND ACETAMINOPHEN 1 TABLET: 5; 325 TABLET ORAL at 06:07

## 2018-07-20 RX ADMIN — SODIUM BICARBONATE 650 MG TABLET 650 MG: at 08:07

## 2018-07-20 RX ADMIN — FOLIC ACID 1 MG: 1 TABLET ORAL at 02:07

## 2018-07-20 RX ADMIN — MEROPENEM 1 G: 1 INJECTION, POWDER, FOR SOLUTION INTRAVENOUS at 02:07

## 2018-07-20 RX ADMIN — HYDROCODONE BITARTRATE AND ACETAMINOPHEN 1 TABLET: 5; 325 TABLET ORAL at 08:07

## 2018-07-20 RX ADMIN — HYDROCORTISONE 2.5%: 25 CREAM TOPICAL at 08:07

## 2018-07-20 RX ADMIN — OXYCODONE HYDROCHLORIDE 15 MG: 5 TABLET ORAL at 04:07

## 2018-07-20 RX ADMIN — ONDANSETRON 4 MG: 2 INJECTION INTRAMUSCULAR; INTRAVENOUS at 06:07

## 2018-07-20 RX ADMIN — SODIUM BICARBONATE 650 MG TABLET 650 MG: at 10:07

## 2018-07-20 RX ADMIN — SODIUM POLYSTYRENE SULFONATE 30 G: 15 SUSPENSION ORAL; RECTAL at 10:07

## 2018-07-20 ASSESSMENT — SYSTEMIC LUPUS ERYTHEMATOSUS DISEASE ACTIVITY INDEX (SLEDAI): TOTAL_SCORE: 8

## 2018-07-20 NOTE — ASSESSMENT & PLAN NOTE
UA is +N with >100 WBC  -- talked to ID pharmacist who recommends meropenem due to MARKEL and drug allergies

## 2018-07-20 NOTE — ASSESSMENT & PLAN NOTE
Seen by heme in the past.    By history (no melena or BRBPR) this is either a hemolytic anemia or a very slow GIB.  Haptoglobin <10, ldh elevated, edmundo +,  and retic high at 3.9.  - transfuse blood, serial Hgb today  - needs c-scope as outpt for screening too due to her age  - hem consulted for hemolysis

## 2018-07-20 NOTE — PROGRESS NOTES
[facility] Hospital Medicine  Progress Note    Patient Name: Talya Bates  MRN: [id number]  Patient Class: IP- Inpatient   Admission Date: 7/19/2018  Length of Stay: 1 days  Attending Physician: Russell Guadarrama MD  Primary Care Provider: Lee Mcnair MD    [facility] Medicine Team: [facility] D Russell Guadarrama MD    Subjective:     Principal Problem:Lupus nephritis    HPI:  This is a 53-year-old female with a PMHx of Lupus, DVT (2012, not on anticoagulants), and anemia who presents to the ED with SOB. This morning at 4:30 AM, she woke with SOB, fatigue, generalized weakness, and lightheadedness, which has been persistent since. She reports SOB at rest and with exertion.     She denies a sedentary lifestyle, HRT, or recent long travel. She also has had hemorrhoid pain, mild upper abdominal pain, diarrhea, and decreased appetite for the past 2 weeks. Her last received a blood transfusion 6 years ago, shortly after being diagnosed with a DVT. She denies blood in her stool, melena, fevers, chills, chest pain, headache, dizziness, or any other medical complaints.     She ran our of her cellcept two weeks ago and is waiting on her MD to renew it.  She has had some hair loss in that time, but no joint pain or rash.  Her flares usually present with weakness such as this.    Hospital Course:  See problem list    Interval History:  Symptoms:   Same weakness.  Diet:  Adequate intake.    Activity level:  Impaired due to weakness.   Pain:  No pain.       Review of Systems   Constitutional: Positive for activity change and fatigue. Negative for fever.   HENT: Negative for congestion.    Eyes: Negative for discharge.   Respiratory: Negative for shortness of breath.    Cardiovascular: Negative for chest pain.   Gastrointestinal: Negative for abdominal pain.   Genitourinary: Negative for difficulty urinating.   Musculoskeletal: Negative for arthralgias.   Skin:        Hair loss     Objective:      Vital Signs (Most Recent):  Temp: 96.8 °F (36 °C) (07/20/18 1141)  Pulse: 60 (07/20/18 1141)  Resp: 18 (07/20/18 1141)  BP: (!) 140/76 (07/20/18 1141)  SpO2: 97 % (07/20/18 1141) Vital Signs (24h Range):  Temp:  [96.3 °F (35.7 °C)-98.9 °F (37.2 °C)] 96.8 °F (36 °C)  Pulse:  [55-76] 60  Resp:  [18-20] 18  SpO2:  [97 %-100 %] 97 %  BP: (120-160)/(59-76) 140/76     Weight: 78.7 kg (173 lb 9.6 oz)  Body mass index is 28.89 kg/m².    Physical Exam   Constitutional: She is oriented to person, place, and time. No distress.   Cardiovascular: Normal rate, regular rhythm and normal heart sounds.    Pulmonary/Chest: Effort normal and breath sounds normal.   Abdominal: Soft. Bowel sounds are normal.   Musculoskeletal: She exhibits no edema.   Neurological: She is alert and oriented to person, place, and time.   Psychiatric: She has a normal mood and affect. Her behavior is normal.           Significant Labs: All pertinent labs within the past 24 hours have been reviewed.    Significant Imaging: I have reviewed and interpreted all pertinent imaging results/findings within the past 24 hours.    Assessment/Plan:      * Lupus nephritis    CKD (chronic kidney disease), stage IV with MARKEL  Baseline Cr of 2.5 now up to 5.5.  P:CR elevated at 0.61.  Renal US ok.  - renal consult given lupus, low bicarb,and high BUN  - started on bicarb for metabolic acidosis          Hyperkalemia    K 5.5 on 7/20  - kayexalate and telemetry          Acute cystitis without hematuria    UA is +N with >100 WBC  -- talked to ID pharmacist who recommends meropenem due to MARKEL and drug allergies          Bleeding hemorrhoid    Pt denies seeing any blood at home so I do not think this is the cause of her anemia.    Still painful  -- topical steroids for hemorrhoidal pain        Lupus (systemic lupus erythematosus)    Pt with hair loss and weakness typical of her lupus flares while off her medications  -- check lupus labs and consult rheum  - resume her home  celcept          Hemolytic anemia    Seen by heme in the past.    By history (no melena or BRBPR) this is either a hemolytic anemia or a very slow GIB.  Haptoglobin <10, ldh elevated, edmundo +,  and retic high at 3.9.  - transfuse blood, serial Hgb today  - needs c-scope as outpt for screening too due to her age  - hem consulted for hemolysis            VTE Risk Mitigation         Ordered     IP VTE HIGH RISK PATIENT  Once      07/19/18 1250     Place AZUL hose  Until discontinued      07/19/18 1250     Place AZUL hose  Until discontinued      07/19/18 1138     Place sequential compression device  Until discontinued      07/19/18 1138     Reason for No Pharmacological VTE Prophylaxis  Once      07/19/18 1138              Russell Guadarrama MD  Department of Hospital Medicine   [facility]

## 2018-07-20 NOTE — ASSESSMENT & PLAN NOTE
Initial BMP showed Cr of 5.5, BUN 60.  She is followed by [facility] and has a baseline Cr of 3.  UA showed 2+ proteinuria, +RBC, +WBC, +Nitrites, +bacteria.  P/Cr ratio is 0.61, Stella 39, Upr 63.  Calculated FeNa shows 2.4% which signals an intrinsic etiology more likely than pre-renal.  This could be due to lupus flare, infection, or maybe due to hemolytic anemia.  Complement levels were normal, but ferritin, ESR, and CRP were elevated.  Hemolytic labs showed low haptoglobin and a normocytic anemia.  Obstructive etiology is unlikely due to renal U/S showing no evidence of stone, and no hydronephrosis.  Seems like there also might be some pre-renal compenent as Cr improved after most recent set of labs from 5.5 to 4.7, possibly due to 1u PRBC and fluids administered.      1. Maintain hemodynamic stability; maintain MAP >65  2. Strict I/O, Hgb >7 gm/dL, daily weight and chart  3. Continue cellcept 500mg qd and prednisone  4. Recommend U/S with doppler to assess RBF  5. Based off immunosuppressed state and UA results, would recommend empiric abx treatment with rocephin until culture results return  6. Recommend primary team to obtain records from [facility] to better characterize past nephrology issues  7.  We will examine urine sediment to look for casts leading to a specific form of intrinsic disease

## 2018-07-20 NOTE — HPI
Ms. Bates is a 53 year old female with medical history of SLE (malar rash, low wct count, arthritis, photosensitivity, hair loss, dry eyes/mouth, raynauds, +ANA1:1280 +SSA, +RNP), lupus nephritis class 4 (follows with [facility]), history of hemolytic anemia (treated with prednisone) who presented to the ED on 7/19 for shortness of breath, fatigue, generalized weakness. She was found to have hgb of 6.2 (9 in Jan 2018), with no signs of bleeding. 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 She states that she has been off her cellcept for 2 weeks. She has increase in hair loss and has some joint pain/swelling in her right hand. She denies fever, chills, weight changes, night sweats, eye pain/redness, dry eyes, dry mouth, oral ulcers, rash, photosensitivity, raynauds, distal hand ulcers,chest pain, constipation, diarrhea, bloody diarrhea.   Of note she last saw her Rheumatologist Dr. Dickerson in December 2017. At that time her cellcept was held as her wct had dropped. She re-started in Feb.   Her labs show normal complements. Her Cr in elevated to 5.5 from 3 in Jan. Her UPC is 0.61 from 1.0 in Jan. Renal u/s showing chronic medical renal disease.   She was restarted on her cellcept 500mg nightly, was given one dose of methylpred 125mg IV.

## 2018-07-20 NOTE — PLAN OF CARE
07/20/18 0958   Discharge Assessment   Assessment Type Discharge Planning Assessment   Confirmed/corrected address and phone number on facesheet? Yes   Assessment information obtained from? Patient   Expected Length of Stay (days) 3   Communicated expected length of stay with patient/caregiver yes   Prior to hospitilization cognitive status: Alert/Oriented   Prior to hospitalization functional status: Independent   Current cognitive status: Alert/Oriented   Current Functional Status: Independent   Lives With spouse   Able to Return to Prior Arrangements yes   Is patient able to care for self after discharge? Yes   Who are your caregiver(s) and their phone number(s)? seth [phone]   Patient's perception of discharge disposition home or selfcare   Readmission Within The Last 30 Days no previous admission in last 30 days   Patient currently being followed by outpatient case management? No   Patient currently receives any other outside agency services? No   Equipment Currently Used at Home none   Does the patient receive services at the [facility] Clinic? No   Discharge Plan A Home with family   Discharge Plan B Home with family   Patient/Family In Agreement With Plan yes

## 2018-07-20 NOTE — ASSESSMENT & PLAN NOTE
Class 4 lupus nephritis   Follows with [facility] nephrology and is enrolled in a study   Cr increased from 3 in Jan 2018 to 5.5 on admission, now decreasing (likely pre-renal component)  UPC 1 in Jan, currently 0.61  Continue cellcept 500mg nightly   Appreciate Renal recs

## 2018-07-20 NOTE — CONSULTS
[facility]  Nephrology  Consult Note    Patient Name: Talya Bates  MRN: [id number]  Admission Date: 2018  Hospital Length of Stay: 1 days  Attending Provider: Russell Guadarrama MD   Primary Care Physician: Lee Mcnair MD  Principal Problem:Lupus nephritis    Inpatient consult to Nephrology  Consult performed by: HEATHER DAMICO  Consult ordered by: RUSSELL GUADARRAMA  Reason for consult: lupus nephritis        Subjective:     HPI: Ms. Bates is a 52 y/o W with a PMHx of SLE, DVT, hemolytic anemia, class IV lupus nephritis that presented to the ED with complaints of SOB, mild abdominal pain, diarrhea, and decreased appetite.  Describes the SOB at both rest and exertion.  She denies a sedentary lifestyle, HRT, or recent long travel. She also has had hemorrhoid pain, and some lightheadedness/dizziness recently.  No issues with urinary urgency, incontinence, or dysuria.  She denies any other urinary complaint.    Her last received a blood transfusion 6 years ago, shortly after being diagnosed with a DVT. She denies blood in her stool, melena, fevers, chills, chest pain, headache, or any other medical complaints.      She ran our of her cellcept two weeks ago and is waiting on her MD to renew it.  She has had some hair loss in that time, but no joint pain or rash.  Her flares usually present with weakness such as this.    Past Medical History:   Diagnosis Date    Acid reflux     Alopecia     Anemia     Anxiety     Arthritis     Blood transfusion     Chronic kidney disease     Deep vein thrombosis 2012    left leg; completed anticoagulation; seen by heme/onc    Dry eyes     Dry mouth     Lupus        Past Surgical History:   Procedure Laterality Date     SECTION      SKIN SURGERY      to frontal head    TUBAL LIGATION         Review of patient's allergies indicates:   Allergen Reactions    Ciprofloxacin Other (See Comments)     Face and lips became  swollen and throat closed    Avelox [moxifloxacin] Rash    Iodinated contrast- oral and iv dye Itching and Swelling    Reglan [metoclopramide hcl] Other (See Comments)     Nervous and paranoid    Amoxicillin      Other reaction(s): Unknown     Current Facility-Administered Medications   Medication Frequency    0.9%  NaCl infusion (for blood administration) Q24H PRN    acetaminophen tablet 650 mg Q4H PRN    cefTRIAXone injection 1 g Q24H    HYDROcodone-acetaminophen 5-325 mg per tablet 1 tablet Q4H PRN    hydrocortisone 2.5 % rectal cream BID    mycophenolate capsule 500 mg QHS    ondansetron injection 4 mg Q8H PRN    oxyCODONE immediate release tablet 15 mg Q4H PRN    sodium chloride 0.9% flush 3 mL Q6H PRN     Family History     Problem Relation (Age of Onset)    Colon cancer Maternal Aunt, Maternal Grandfather    Diabetes Sister    Diabetes Mellitus Sister, Sister    Heart disease Mother    Hypertension Sister    Kidney disease Mother, Sister    Rheum arthritis Mother        Social History Main Topics    Smoking status: Never Smoker    Smokeless tobacco: Never Used    Alcohol use No    Drug use: No    Sexual activity: Not Currently     Review of Systems   Constitutional: Positive for activity change and fatigue. Negative for fever.   HENT: Negative for congestion.    Eyes: Negative for discharge.   Respiratory: Positive for shortness of breath. Negative for cough and chest tightness.    Cardiovascular: Negative for chest pain and leg swelling.   Gastrointestinal: Negative for abdominal distention and abdominal pain.   Endocrine: Negative for cold intolerance.   Genitourinary: Negative for difficulty urinating, dysuria, frequency, hematuria and urgency.   Musculoskeletal: Negative for arthralgias.   Skin: Negative for rash.        Hair loss   Allergic/Immunologic: Negative for environmental allergies.   Neurological: Positive for light-headedness.   Hematological: Negative for adenopathy.    Psychiatric/Behavioral: Negative for behavioral problems.     Objective:     Vital Signs (Most Recent):  Temp: 97.6 °F (36.4 °C) (07/20/18 0554)  Pulse: (!) 55 (07/20/18 0554)  Resp: 20 (07/20/18 0554)  BP: (!) 160/72 (07/20/18 0554)  SpO2: 97 % (07/20/18 0554)  O2 Device (Oxygen Therapy): room air (07/20/18 0554) Vital Signs (24h Range):  Temp:  [96.3 °F (35.7 °C)-98.9 °F (37.2 °C)] 97.6 °F (36.4 °C)  Pulse:  [55-76] 55  Resp:  [20] 20  SpO2:  [97 %-100 %] 97 %  BP: (120-160)/(59-75) 160/72     Weight: 78.7 kg (173 lb 9.6 oz) (07/19/18 1230)  Body mass index is 28.89 kg/m².  Body surface area is 1.9 meters squared.    I/O last 3 completed shifts:  In: 1571.3 [P.O.:840; Blood:231.3; IV Piggyback:500]  Out: 300 [Urine:300]    Physical Exam   Constitutional: She is oriented to person, place, and time. No distress.   HENT:   Head: Normocephalic and atraumatic.   Right Ear: External ear normal.   Left Ear: External ear normal.   Eyes: Conjunctivae are normal. Left eye exhibits no discharge. No scleral icterus.   Neck: Normal range of motion. Neck supple. No JVD present.   Cardiovascular: Normal rate, regular rhythm and normal heart sounds.    Pulmonary/Chest: Effort normal and breath sounds normal.   Abdominal: Soft. Bowel sounds are normal. She exhibits no distension. There is no tenderness.   Musculoskeletal: Normal range of motion. She exhibits no edema.   Neurological: She is alert and oriented to person, place, and time.   Skin: Skin is warm and dry.   Psychiatric: She has a normal mood and affect. Her behavior is normal.       Significant Labs:  ABGs: No results for input(s): PH, PCO2, HCO3, POCSATURATED, BE in the last 168 hours.  CBC:   Recent Labs  Lab 07/20/18  0844   WBC 7.58   RBC 2.88*   HGB 9.0*   HCT 26.1*      MCV 91   MCH 31.3*   MCHC 34.5     CMP:   Recent Labs  Lab 07/19/18  0908      CALCIUM 9.1   ALBUMIN 4.0   PROT 7.9      K 4.5   CO2 15*   *   BUN 60*   CREATININE 5.5*    ALKPHOS 56   ALT 10   AST 22   BILITOT 0.9     Coagulation: No results for input(s): PT, INR, APTT in the last 168 hours.  LFTs:   Recent Labs  Lab 07/19/18  0908   ALT 10   AST 22   ALKPHOS 56   BILITOT 0.9   PROT 7.9   ALBUMIN 4.0     Microbiology Results (last 7 days)     Procedure Component Value Units Date/Time    Urine culture [[id number]] Collected:  07/19/18 1330    Order Status:  No result Specimen:  Urine Updated:  07/19/18 1443    Urine culture [[id number]]     Order Status:  Completed Specimen:  Urine     Urine culture **CANNOT BE ORDERED STAT** [[id number]] Collected:  07/19/18 1108    Order Status:  Sent Specimen:  Urine from Urine, Clean Catch Updated:  07/19/18 1109          Recent Labs  Lab 07/19/18  1108   COLORU Yellow   SPECGRAV 1.010   PHUR 5.0   PROTEINUA 2+*   BACTERIA Many*   NITRITE Positive*   LEUKOCYTESUR 3+*   UROBILINOGEN Negative   HYALINECASTS 0       Significant Imaging:  X-Ray: Reviewed  No acute abnormality   U/S 7/19: chronic renal disease, no hydronephrosis    Assessment/Plan:     MARKEL (acute kidney injury)    Initial BMP showed Cr of 5.5, BUN 60.  She is followed by [facility] and has a baseline Cr of 3.  UA showed 2+ proteinuria, +RBC, +WBC, +Nitrites, +bacteria.  P/Cr ratio is 0.61, Stella 39, Upr 63.  Calculated FeNa shows 2.4% which signals an intrinsic etiology more likely than pre-renal.  This could be due to lupus flare, infection, or maybe due to hemolytic anemia.  Complement levels were normal, but ferritin, ESR, and CRP were elevated.  Hemolytic labs showed low haptoglobin and a normocytic anemia.  Obstructive etiology is unlikely due to renal U/S showing no evidence of stone, and no hydronephrosis.  Seems like there also might be some pre-renal compenent as Cr improved after most recent set of labs from 5.5 to 4.7, possibly due to 1u PRBC and fluids administered.      1. Maintain hemodynamic stability; maintain MAP >65  2. Strict I/O, Hgb >7 gm/dL, daily weight and  chart  3. Continue cellcept 500mg qd and prednisone  4. Recommend U/S with doppler to assess RBF  5. Based off immunosuppressed state and UA results, would recommend empiric abx treatment with rocephin until culture results return  6. Recommend primary team to obtain records from [facility] to better characterize past nephrology issues  7.  We will examine urine sediment to look for casts leading to a specific form of intrinsic disease            Thank you for your consult. I will follow-up with patient. Please contact us if you have any additional questions.    Christopher De La Vega MD  Nephrology  [facility]

## 2018-07-20 NOTE — SUBJECTIVE & OBJECTIVE
Interval History:  Symptoms:   Same weakness.  Diet:  Adequate intake.    Activity level:  Impaired due to weakness.   Pain:  No pain.       Review of Systems   Constitutional: Positive for activity change and fatigue. Negative for fever.   HENT: Negative for congestion.    Eyes: Negative for discharge.   Respiratory: Negative for shortness of breath.    Cardiovascular: Negative for chest pain.   Gastrointestinal: Negative for abdominal pain.   Genitourinary: Negative for difficulty urinating.   Musculoskeletal: Negative for arthralgias.   Skin:        Hair loss     Objective:     Vital Signs (Most Recent):  Temp: 96.8 °F (36 °C) (07/20/18 1141)  Pulse: 60 (07/20/18 1141)  Resp: 18 (07/20/18 1141)  BP: (!) 140/76 (07/20/18 1141)  SpO2: 97 % (07/20/18 1141) Vital Signs (24h Range):  Temp:  [96.3 °F (35.7 °C)-98.9 °F (37.2 °C)] 96.8 °F (36 °C)  Pulse:  [55-76] 60  Resp:  [18-20] 18  SpO2:  [97 %-100 %] 97 %  BP: (120-160)/(59-76) 140/76     Weight: 78.7 kg (173 lb 9.6 oz)  Body mass index is 28.89 kg/m².    Physical Exam   Constitutional: She is oriented to person, place, and time. No distress.   Cardiovascular: Normal rate, regular rhythm and normal heart sounds.    Pulmonary/Chest: Effort normal and breath sounds normal.   Abdominal: Soft. Bowel sounds are normal.   Musculoskeletal: She exhibits no edema.   Neurological: She is alert and oriented to person, place, and time.   Psychiatric: She has a normal mood and affect. Her behavior is normal.           Significant Labs: All pertinent labs within the past 24 hours have been reviewed.    Significant Imaging: I have reviewed and interpreted all pertinent imaging results/findings within the past 24 hours.

## 2018-07-20 NOTE — PLAN OF CARE
Problem: Patient Care Overview  Goal: Plan of Care Review  Outcome: Ongoing (interventions implemented as appropriate)  Reviewed POC with pt and spouse. Both verbalize understanding of POC.

## 2018-07-20 NOTE — NURSING
Pt receiving first unit of blood. No s/s transfusion reaction noted. Pt tolerating blood. IV site wnl. Report given to Cyndi solis RN.

## 2018-07-20 NOTE — SUBJECTIVE & OBJECTIVE
Past Medical History:   Diagnosis Date    Acid reflux     Alopecia     Anemia     Anxiety     Arthritis     Blood transfusion     Chronic kidney disease     Deep vein thrombosis 2012    left leg; completed anticoagulation; seen by heme/onc    Dry eyes     Dry mouth     Lupus        Past Surgical History:   Procedure Laterality Date     SECTION      SKIN SURGERY      to frontal head    TUBAL LIGATION           There is no immunization history on file for this patient.    Review of patient's allergies indicates:   Allergen Reactions    Ciprofloxacin Hives    Avelox [moxifloxacin] Rash    Iodinated contrast- oral and iv dye Itching and Swelling    Reglan [metoclopramide hcl] Other (See Comments)     Nervous and paranoid    Amoxicillin Edema     Face and lips became swollen with rash.     Current Facility-Administered Medications   Medication Frequency    0.9%  NaCl infusion (for blood administration) Q24H PRN    acetaminophen tablet 650 mg Q4H PRN    folic acid tablet 1 mg Daily    HYDROcodone-acetaminophen 5-325 mg per tablet 1 tablet Q4H PRN    hydrocortisone 2.5 % rectal cream BID    meropenem 1 g in sodium chloride 0.9 % 100 mL IVPB (ready to mix system) Q12H    mycophenolate capsule 500 mg QHS    ondansetron injection 4 mg Q8H PRN    oxyCODONE immediate release tablet 15 mg Q4H PRN    pantoprazole EC tablet 40 mg Daily    predniSONE tablet 60 mg Daily    sodium bicarbonate tablet 650 mg BID    sodium chloride 0.9% flush 3 mL Q6H PRN     Family History     Problem Relation (Age of Onset)    Colon cancer Maternal Aunt, Maternal Grandfather    Diabetes Sister    Diabetes Mellitus Sister, Sister    Heart disease Mother    Hypertension Sister    Kidney disease Mother, Sister    Rheum arthritis Mother        Social History Main Topics    Smoking status: Never Smoker    Smokeless tobacco: Never Used    Alcohol use No    Drug use: No    Sexual activity: Not Currently     Review  of Systems   Constitutional: Positive for fatigue. Negative for activity change, appetite change and fever.   HENT: Negative for mouth sores.         Negative hair loss    Eyes: Negative for pain and redness.   Respiratory: Positive for shortness of breath. Negative for chest tightness.    Cardiovascular: Negative for chest pain and leg swelling.   Gastrointestinal: Negative for abdominal pain, blood in stool, constipation and diarrhea.   Endocrine: Negative for cold intolerance.   Genitourinary: Negative for dysuria and hematuria.   Musculoskeletal: Positive for arthralgias. Negative for back pain and neck stiffness.   Skin: Negative for rash.   Neurological: Negative for numbness and headaches.     Objective:     Vital Signs (Most Recent):  Temp: 98.2 °F (36.8 °C) (07/20/18 1540)  Pulse: 70 (07/20/18 1540)  Resp: 18 (07/20/18 1540)  BP: 135/82 (07/20/18 1540)  SpO2: 99 % (07/20/18 1540)  O2 Device (Oxygen Therapy): room air (07/20/18 0900) Vital Signs (24h Range):  Temp:  [96.3 °F (35.7 °C)-98.9 °F (37.2 °C)] 98.2 °F (36.8 °C)  Pulse:  [55-76] 70  Resp:  [18-20] 18  SpO2:  [97 %-99 %] 99 %  BP: (120-160)/(60-82) 135/82     Weight: 78.7 kg (173 lb 9.6 oz) (07/19/18 1230)  Body mass index is 28.89 kg/m².  Body surface area is 1.9 meters squared.      Intake/Output Summary (Last 24 hours) at 07/20/18 1741  Last data filed at 07/20/18 1500   Gross per 24 hour   Intake          1551.25 ml   Output                0 ml   Net          1551.25 ml       Physical Exam   Constitutional: She is oriented to person, place, and time and well-developed, well-nourished, and in no distress. No distress.   HENT:   Head: Normocephalic and atraumatic.   Mouth/Throat: Oropharynx is clear and moist. No oropharyngeal exudate.   Eyes: Conjunctivae and EOM are normal. Pupils are equal, round, and reactive to light. No scleral icterus.   Neck: Normal range of motion.   Cardiovascular: Normal rate and regular rhythm.    No murmur  heard.  Pulmonary/Chest: Effort normal and breath sounds normal. No respiratory distress. She has no wheezes. She has no rales.   Abdominal: Soft. Bowel sounds are normal. She exhibits no distension. There is no tenderness.       Right Side Rheumatological Exam     Examination finds the shoulder, elbow, wrist, knee, 1st PIP, 1st MCP, 3rd PIP, 4th PIP, 4th MCP, 5th PIP and 5th MCP normal.    The patient is tender to palpation of the 2nd PIP, 2nd MCP and 3rd MCP    She has swelling of the 2nd PIP, 2nd MCP and 3rd MCP    Muscle Strength (0-5 scale):  Biceps: 5/5   Distal Lower Extremity: 5    Left Side Rheumatological Exam     Examination finds the shoulder, elbow, wrist, knee, 1st PIP, 1st MCP, 2nd PIP, 2nd MCP, 3rd PIP, 3rd MCP, 4th PIP, 4th MCP, 5th PIP and 5th MCP normal.    Muscle Strength (0-5 scale):  Biceps: 5/5   Distal Lower Extremity: 5      Neurological: She is alert and oriented to person, place, and time. No cranial nerve deficit.   Skin: Skin is warm and dry. No rash noted. She is not diaphoretic. No erythema.     Musculoskeletal: Normal range of motion. She exhibits no edema.         Significant Labs:  BMP:   Recent Labs  Lab 07/20/18  0844   *      K 5.5*   *   CO2 18*   BUN 59*   CREATININE 4.7*   CALCIUM 9.1     CBC:   Recent Labs  Lab 07/20/18  0844 07/20/18  1322   WBC 7.58  --    HGB 9.0* 9.5*   HCT 26.1*  --      --      CMP:   Recent Labs  Lab 07/20/18  0844   *   CALCIUM 9.1   ALBUMIN 3.6      K 5.5*   CO2 18*   *   BUN 59*   CREATININE 4.7*     Coagulation: No results for input(s): LABPROT, INR, APTT in the last 24 hours.    Significant Imaging:  Imaging results within the past 24 hours have been reviewed.

## 2018-07-20 NOTE — HPI
Pt is a 52 yo F with h/o lupu, AIHA from lupus, lupus nephritis, DVT in 2012 who presents to the hospital with complaint of extreme fatigue and SOB.  Since admission the patient was found to v a hemolytic anemia with positive KULWINDER.  She received one unit of PRBC's yesterday as well as 125mg of methylprednisolone.  The patient had run out of her Cellcept 2 weeks ago and was waiting for her rheumatologist to renew the medication.  The patient states she has felt sick for the past week with increasing fatigue.  She states she felt that she had a cold for 2 weeks preceeding admission.  She denies fever, chills, cough, CP, N/V, dysuria.  She does not recall a past history of hemolytic anemia treated with steroids.

## 2018-07-20 NOTE — ASSESSMENT & PLAN NOTE
-The patient has edmundo positive hemolytic anemia with positive KULWINDER, low haptoglobin, elevated LDH  -Peripheral smear was viewed showing multiple spherocytes, no schistocytes  -Given recent history of running out of her Cellcept to treat her lupus, the concern is for lupus flare with subsequent hemolytic anemia.  -The blood bank was called and confirmed KULWINDER to be warm agglutinin IgG and Complement mediated which can be seen in lupus.  -The patient appeared to have responded with blood transfusion and the administration of steroids as her hemoglobin has improved to 9.0 today from 5.6 on admission  -Would start the patient on prednisone 60mg daily.  If the patient responds to this treatment, would continue the patient on this regimen until follow up as an outpatient with hematology.  -Would also start the patient on folic acid 1mg daily.

## 2018-07-20 NOTE — ASSESSMENT & PLAN NOTE
CKD (chronic kidney disease), stage IV with MARKEL  Baseline Cr of 2.5 now up to 5.5.  P:CR elevated at 0.61.  Renal US ok.  - renal consult given lupus, low bicarb,and high BUN  - started on bicarb for metabolic acidosis

## 2018-07-20 NOTE — PROGRESS NOTES
[facility]  Rheumatology  Progress Note    Patient Name: Talya Bates  MRN: [id number]  Admission Date: 7/19/2018  Hospital Length of Stay: 1 days  Code Status: Full Code   Attending Provider: Russell Guadarrama MD  Primary Care Physician: Lee Mcnair MD  Principal Problem: Lupus (systemic lupus erythematosus)    Subjective:     HPI: Ms. Bates is a 53 year old female with medical history of SLE (malar rash, low wct count, arthritis, photosensitivity, hair loss, dry eyes/mouth, raynauds, +ANA1:1280 +SSA, +RNP), lupus nephritis class 4 (follows with Yas), history of hemolytic anemia (treated with prednisone) who presented to the ED on 7/19 for shortness of breath, fatigue, generalized weakness. She was found to have hgb of 6.2 (9 in Jan 2018), with no signs of bleeding. 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 She states that she has been off her cellcept for 2 weeks. She has increase in hair loss and has some joint pain/swelling in her right hand. She denies fever, chills, weight changes, night sweats, eye pain/redness, dry eyes, dry mouth, oral ulcers, rash, photosensitivity, raynauds, distal hand ulcers,chest pain, constipation, diarrhea, bloody diarrhea.   Of note she last saw her Rheumatologist Dr. Dickerson in December 2017. At that time her cellcept was held as her wct had dropped. She re-started in Feb.   Her labs show normal complements. Her Cr in elevated to 5.5 from 3 in Jan. Her UPC is 0.61 from 1.0 in Jan. Renal u/s showing chronic medical renal disease.   She was restarted on her cellcept 500mg nightly, was given one dose of methylpred 125mg IV.     Past Medical History:   Diagnosis Date    Acid reflux     Alopecia     Anemia     Anxiety     Arthritis     Blood transfusion     Chronic kidney disease     Deep vein thrombosis 2012    left leg; completed anticoagulation; seen by heme/onc    Dry eyes     Dry mouth      Lupus        Past Surgical History:   Procedure Laterality Date     SECTION      SKIN SURGERY      to frontal head    TUBAL LIGATION           There is no immunization history on file for this patient.    Review of patient's allergies indicates:   Allergen Reactions    Ciprofloxacin Hives    Avelox [moxifloxacin] Rash    Iodinated contrast- oral and iv dye Itching and Swelling    Reglan [metoclopramide hcl] Other (See Comments)     Nervous and paranoid    Amoxicillin Edema     Face and lips became swollen with rash.     Current Facility-Administered Medications   Medication Frequency    0.9%  NaCl infusion (for blood administration) Q24H PRN    acetaminophen tablet 650 mg Q4H PRN    folic acid tablet 1 mg Daily    HYDROcodone-acetaminophen 5-325 mg per tablet 1 tablet Q4H PRN    hydrocortisone 2.5 % rectal cream BID    meropenem 1 g in sodium chloride 0.9 % 100 mL IVPB (ready to mix system) Q12H    mycophenolate capsule 500 mg QHS    ondansetron injection 4 mg Q8H PRN    oxyCODONE immediate release tablet 15 mg Q4H PRN    pantoprazole EC tablet 40 mg Daily    predniSONE tablet 60 mg Daily    sodium bicarbonate tablet 650 mg BID    sodium chloride 0.9% flush 3 mL Q6H PRN     Family History     Problem Relation (Age of Onset)    Colon cancer Maternal Aunt, Maternal Grandfather    Diabetes Sister    Diabetes Mellitus Sister, Sister    Heart disease Mother    Hypertension Sister    Kidney disease Mother, Sister    Rheum arthritis Mother        Social History Main Topics    Smoking status: Never Smoker    Smokeless tobacco: Never Used    Alcohol use No    Drug use: No    Sexual activity: Not Currently     Review of Systems   Constitutional: Positive for fatigue. Negative for activity change, appetite change and fever.   HENT: Negative for mouth sores.         Negative hair loss    Eyes: Negative for pain and redness.   Respiratory: Positive for shortness of breath. Negative for chest  tightness.    Cardiovascular: Negative for chest pain and leg swelling.   Gastrointestinal: Negative for abdominal pain, blood in stool, constipation and diarrhea.   Endocrine: Negative for cold intolerance.   Genitourinary: Negative for dysuria and hematuria.   Musculoskeletal: Positive for arthralgias. Negative for back pain and neck stiffness.   Skin: Negative for rash.   Neurological: Negative for numbness and headaches.     Objective:     Vital Signs (Most Recent):  Temp: 98.2 °F (36.8 °C) (07/20/18 1540)  Pulse: 70 (07/20/18 1540)  Resp: 18 (07/20/18 1540)  BP: 135/82 (07/20/18 1540)  SpO2: 99 % (07/20/18 1540)  O2 Device (Oxygen Therapy): room air (07/20/18 0900) Vital Signs (24h Range):  Temp:  [96.3 °F (35.7 °C)-98.9 °F (37.2 °C)] 98.2 °F (36.8 °C)  Pulse:  [55-76] 70  Resp:  [18-20] 18  SpO2:  [97 %-99 %] 99 %  BP: (120-160)/(60-82) 135/82     Weight: 78.7 kg (173 lb 9.6 oz) (07/19/18 1230)  Body mass index is 28.89 kg/m².  Body surface area is 1.9 meters squared.      Intake/Output Summary (Last 24 hours) at 07/20/18 1741  Last data filed at 07/20/18 1500   Gross per 24 hour   Intake          1551.25 ml   Output                0 ml   Net          1551.25 ml       Physical Exam   Constitutional: She is oriented to person, place, and time and well-developed, well-nourished, and in no distress. No distress.   HENT:   Head: Normocephalic and atraumatic.   Mouth/Throat: Oropharynx is clear and moist. No oropharyngeal exudate.   Eyes: Conjunctivae and EOM are normal. Pupils are equal, round, and reactive to light. No scleral icterus.   Neck: Normal range of motion.   Cardiovascular: Normal rate and regular rhythm.    No murmur heard.  Pulmonary/Chest: Effort normal and breath sounds normal. No respiratory distress. She has no wheezes. She has no rales.   Abdominal: Soft. Bowel sounds are normal. She exhibits no distension. There is no tenderness.       Right Side Rheumatological Exam     Examination finds the  shoulder, elbow, wrist, knee, 1st PIP, 1st MCP, 3rd PIP, 4th PIP, 4th MCP, 5th PIP and 5th MCP normal.    The patient is tender to palpation of the 2nd PIP, 2nd MCP and 3rd MCP    She has swelling of the 2nd PIP, 2nd MCP and 3rd MCP    Muscle Strength (0-5 scale):  Biceps: 5/5   Distal Lower Extremity: 5    Left Side Rheumatological Exam     Examination finds the shoulder, elbow, wrist, knee, 1st PIP, 1st MCP, 2nd PIP, 2nd MCP, 3rd PIP, 3rd MCP, 4th PIP, 4th MCP, 5th PIP and 5th MCP normal.    Muscle Strength (0-5 scale):  Biceps: 5/5   Distal Lower Extremity: 5      Neurological: She is alert and oriented to person, place, and time. No cranial nerve deficit.   Skin: Skin is warm and dry. No rash noted. She is not diaphoretic. No erythema.     Musculoskeletal: Normal range of motion. She exhibits no edema.         Significant Labs:  BMP:   Recent Labs  Lab 07/20/18  0844   *      K 5.5*   *   CO2 18*   BUN 59*   CREATININE 4.7*   CALCIUM 9.1     CBC:   Recent Labs  Lab 07/20/18  0844 07/20/18  1322   WBC 7.58  --    HGB 9.0* 9.5*   HCT 26.1*  --      --      CMP:   Recent Labs  Lab 07/20/18  0844   *   CALCIUM 9.1   ALBUMIN 3.6      K 5.5*   CO2 18*   *   BUN 59*   CREATININE 4.7*     Coagulation: No results for input(s): LABPROT, INR, APTT in the last 24 hours.    Significant Imaging:  Imaging results within the past 24 hours have been reviewed.    Assessment/Plan:     * Lupus (systemic lupus erythematosus)    Ms. Bates is a 53 year old female with medical history of SLE (malar rash, low wct count, arthritis, photosensitivity, hair loss, dry eyes/mouth, raynauds, +ANA1:1280 +SSA, +RNP), lupus nephritis class 4 (follows with Yas), history of hemolytic anemia (treated with prednisone) who presented to the ED on 7/19 for shortness of breath, fatigue, generalized weakness. She was found to have hgb of 6.2 (9 in Jan 2018), with no signs of bleeding. Her LDH was 292,  haptoglobin <10, retic >150, +edmundo concerning for autoimmune hemolytic anemia likely secondary to SLE flare. HER SLEDAI is 8 (arthritis and proteinuria) although her proteinuria has improved. Her complements are normal, dsDNA negative. This is likely a mild flare in context of not taking cellcept for 2 weeks.     Treatment this far:  Solumedrol x1 125mg on 7/19  Prednisone 60mg daily on 7/20  cellcept 500mg daily on 7/19   PLQ on 7/20    Plan:  -appreciate heme/oncs recommendations   -continue prednisone 60mg daily at this time.   -continue cellcept 500mg daily   -continue PLQ, dose adjust due to renal function to 200mg daily         Lupus nephritis    Class 4 lupus nephritis   Follows with [facility] nephrology and is enrolled in a study   Cr increased from 3 in Jan 2018 to 5.5 on admission, now decreasing (likely pre-renal component)  UPC 1 in Jan, currently 0.61  Continue cellcept 500mg nightly   Appreciate Renal recs         Hemolytic anemia    Likely secondary to SLE   Appreciate Heme/onc recommendations   Continue prednisone 60mg               Sue Hatfield MD  Rheumatology  [facility]

## 2018-07-20 NOTE — HPI
Ms. Bates is a 52 y/o W with a PMHx of SLE, DVT, hemolytic anemia, class IV lupus nephritis that presented to the ED with complaints of SOB, mild abdominal pain, diarrhea, and decreased appetite.  Describes the SOB at both rest and exertion.  She denies a sedentary lifestyle, HRT, or recent long travel. She also has had hemorrhoid pain, and some lightheadedness/dizziness recently.  No issues with urinary urgency, incontinence, or dysuria.  She denies any other urinary complaint.    Her last received a blood transfusion 6 years ago, shortly after being diagnosed with a DVT. She denies blood in her stool, melena, fevers, chills, chest pain, headache, or any other medical complaints.      She ran our of her cellcept two weeks ago and is waiting on her MD to renew it.  She has had some hair loss in that time, but no joint pain or rash.  Her flares usually present with weakness such as this.

## 2018-07-20 NOTE — SUBJECTIVE & OBJECTIVE
Oncology Treatment Plan:   [No treatment plan]    Medications:  Continuous Infusions:  Scheduled Meds:   cefTRIAXone (ROCEPHIN) IVPB  1 g Intravenous Q24H    hydrocortisone   Rectal BID    mycophenolate  500 mg Oral QHS    sodium bicarbonate  650 mg Oral BID    sodium polystyrene  30 g Oral Once     PRN Meds:sodium chloride, acetaminophen, HYDROcodone-acetaminophen, ondansetron, oxyCODONE, sodium chloride 0.9%     Review of patient's allergies indicates:   Allergen Reactions    Ciprofloxacin Other (See Comments)     Face and lips became swollen and throat closed    Avelox [moxifloxacin] Rash    Iodinated contrast- oral and iv dye Itching and Swelling    Reglan [metoclopramide hcl] Other (See Comments)     Nervous and paranoid    Amoxicillin      Other reaction(s): Unknown        Past Medical History:   Diagnosis Date    Acid reflux     Alopecia     Anemia     Anxiety     Arthritis     Blood transfusion     Chronic kidney disease     Deep vein thrombosis 2012    left leg; completed anticoagulation; seen by heme/onc    Dry eyes     Dry mouth     Lupus      Past Surgical History:   Procedure Laterality Date     SECTION      SKIN SURGERY      to frontal head    TUBAL LIGATION       Family History     Problem Relation (Age of Onset)    Colon cancer Maternal Aunt, Maternal Grandfather    Diabetes Sister    Diabetes Mellitus Sister, Sister    Heart disease Mother    Hypertension Sister    Kidney disease Mother, Sister    Rheum arthritis Mother        Social History Main Topics    Smoking status: Never Smoker    Smokeless tobacco: Never Used    Alcohol use No    Drug use: No    Sexual activity: Not Currently       Review of Systems   Constitutional: Positive for appetite change, diaphoresis, fatigue (occasional which she attributes to menopause) and unexpected weight change. Negative for chills and fever.   HENT: Negative for sore throat and trouble swallowing.    Eyes: Negative for  photophobia, pain and visual disturbance.   Respiratory: Negative for chest tightness and shortness of breath.    Cardiovascular: Negative for chest pain, palpitations and leg swelling.   Gastrointestinal: Positive for diarrhea. Negative for abdominal pain, constipation, nausea and vomiting.   Genitourinary: Negative for difficulty urinating and dysuria.   Musculoskeletal: Negative for arthralgias and back pain.   Skin: Negative for color change and rash.   Neurological: Negative for weakness, numbness and headaches.   Hematological: Negative for adenopathy. Bruises/bleeds easily.     Objective:     Vital Signs (Most Recent):  Temp: 96.3 °F (35.7 °C) (07/20/18 0900)  Pulse: (!) 57 (07/20/18 0900)  Resp: 20 (07/20/18 0900)  BP: (!) 144/70 (07/20/18 0900)  SpO2: 99 % (07/20/18 0900) Vital Signs (24h Range):  Temp:  [96.3 °F (35.7 °C)-98.9 °F (37.2 °C)] 96.3 °F (35.7 °C)  Pulse:  [55-76] 57  Resp:  [20] 20  SpO2:  [97 %-100 %] 99 %  BP: (120-160)/(59-75) 144/70     Weight: 78.7 kg (173 lb 9.6 oz)  Body mass index is 28.89 kg/m².  Body surface area is 1.9 meters squared.      Intake/Output Summary (Last 24 hours) at 07/20/18 1052  Last data filed at 07/19/18 2205   Gross per 24 hour   Intake          1571.25 ml   Output              300 ml   Net          1271.25 ml       Physical Exam   Constitutional: She is oriented to person, place, and time. She appears well-developed and well-nourished. No distress.   HENT:   Head: Normocephalic and atraumatic.   Eyes: Right eye exhibits no discharge. Left eye exhibits no discharge. No scleral icterus.   Cardiovascular: Normal rate, regular rhythm, normal heart sounds and intact distal pulses.  Exam reveals no gallop and no friction rub.    No murmur heard.  Pulmonary/Chest: Effort normal and breath sounds normal. No respiratory distress. She has no wheezes. She has no rales. She exhibits no tenderness.   Abdominal: Soft. Bowel sounds are normal. She exhibits no distension and no  mass. There is no tenderness. There is no rebound and no guarding.   Musculoskeletal: Normal range of motion. She exhibits no edema or tenderness.   Lymphadenopathy:        Head (right side): No submental and no submandibular adenopathy present.        Head (left side): No submental and no submandibular adenopathy present.     She has no cervical adenopathy.     She has no axillary adenopathy.        Right: No supraclavicular adenopathy present.        Left: No supraclavicular adenopathy present.   Neurological: She is alert and oriented to person, place, and time.   Skin: No rash noted. She is not diaphoretic. No erythema.   Psychiatric: She has a normal mood and affect. Her behavior is normal.       Significant Labs:   Recent Results (from the past 24 hour(s))   Urinalysis Only    Collection Time: 07/19/18 11:08 AM   Result Value Ref Range    Specimen UA Urine, Clean Catch     Color, UA Yellow Yellow, Straw, Luh    Appearance, UA Cloudy (A) Clear    pH, UA 5.0 5.0 - 8.0    Specific Gravity, UA 1.010 1.005 - 1.030    Protein, UA 2+ (A) Negative    Glucose, UA Negative Negative    Ketones, UA Negative Negative    Bilirubin (UA) Negative Negative    Occult Blood UA 1+ (A) Negative    Nitrite, UA Positive (A) Negative    Urobilinogen, UA Negative <2.0 EU/dL    Leukocytes, UA 3+ (A) Negative   Urinalysis Microscopic    Collection Time: 07/19/18 11:08 AM   Result Value Ref Range    RBC, UA 12 (H) 0 - 4 /hpf    WBC, UA >100 (H) 0 - 5 /hpf    WBC Clumps, UA Few (A) None-Rare    Bacteria, UA Many (A) None-Occ /hpf    Squam Epithel, UA 1 /hpf    Hyaline Casts, UA 0 0-1/lpf /lpf    Microscopic Comment SEE COMMENT    Creatinine, urine, random    Collection Time: 07/19/18 11:08 AM   Result Value Ref Range    Creatinine, Random Ur 103.0 15.0 - 325.0 mg/dL   Sodium, urine, random    Collection Time: 07/19/18 11:08 AM   Result Value Ref Range    Sodium Urine Random 39 20 - 250 mmol/L   Protein / creatinine ratio, urine     Collection Time: 07/19/18 11:08 AM   Result Value Ref Range    Protein, Urine Random 63 (H) 0 - 15 mg/dL    Creatinine, Random Ur 103.0 15.0 - 325.0 mg/dL    Prot/Creat Ratio, Ur 0.61 (H) 0.00 - 0.20   Type & Screen    Collection Time: 07/19/18 11:41 AM   Result Value Ref Range    Group & Rh A POS     Indirect Zackary POS    C3 complement    Collection Time: 07/19/18  5:06 PM   Result Value Ref Range    Complement (C-3) 67 50 - 180 mg/dL   C4 complement    Collection Time: 07/19/18  5:06 PM   Result Value Ref Range    Complement (C-4) 27 11 - 44 mg/dL   Sedimentation rate, manual    Collection Time: 07/19/18  5:06 PM   Result Value Ref Range    Sed Rate >150 (H) 0 - 20 mm/Hr   High sensitivity CRP    Collection Time: 07/19/18  5:06 PM   Result Value Ref Range    CRP, High Sensitivity 6.48 (H) 0.00 - 3.19 mg/L   Hemoglobin    Collection Time: 07/19/18  5:06 PM   Result Value Ref Range    Hemoglobin 5.6 (LL) 12.0 - 16.0 g/dL   Lactate dehydrogenase    Collection Time: 07/19/18  5:06 PM   Result Value Ref Range     (H) 110 - 260 U/L   Haptoglobin    Collection Time: 07/19/18  5:06 PM   Result Value Ref Range    Haptoglobin <10 (L) 30 - 250 mg/dL   CBC auto differential    Collection Time: 07/20/18  8:44 AM   Result Value Ref Range    WBC 7.58 3.90 - 12.70 K/uL    RBC 2.88 (L) 4.00 - 5.40 M/uL    Hemoglobin 9.0 (L) 12.0 - 16.0 g/dL    Hematocrit 26.1 (L) 37.0 - 48.5 %    MCV 91 82 - 98 fL    MCH 31.3 (H) 27.0 - 31.0 pg    MCHC 34.5 32.0 - 36.0 g/dL    RDW 15.6 (H) 11.5 - 14.5 %    Platelets 327 150 - 350 K/uL    MPV 9.7 9.2 - 12.9 fL    Immature Granulocytes 1.3 (H) 0.0 - 0.5 %    Gran # (ANC) 6.6 1.8 - 7.7 K/uL    Immature Grans (Abs) 0.10 (H) 0.00 - 0.04 K/uL    Lymph # 0.8 (L) 1.0 - 4.8 K/uL    Mono # 0.1 (L) 0.3 - 1.0 K/uL    Eos # 0.0 0.0 - 0.5 K/uL    Baso # 0.01 0.00 - 0.20 K/uL    nRBC 0 0 /100 WBC    Gran% 86.9 (H) 38.0 - 73.0 %    Lymph% 10.0 (L) 18.0 - 48.0 %    Mono% 1.7 (L) 4.0 - 15.0 %    Eosinophil%  0.0 0.0 - 8.0 %    Basophil% 0.1 0.0 - 1.9 %    Differential Method Automated    Renal function panel    Collection Time: 07/20/18  8:44 AM   Result Value Ref Range    Glucose 150 (H) 70 - 110 mg/dL    Sodium 141 136 - 145 mmol/L    Potassium 5.5 (H) 3.5 - 5.1 mmol/L    Chloride 115 (H) 95 - 110 mmol/L    CO2 18 (L) 23 - 29 mmol/L    BUN, Bld 59 (H) 6 - 20 mg/dL    Calcium 9.1 8.7 - 10.5 mg/dL    Creatinine 4.7 (H) 0.5 - 1.4 mg/dL    Albumin 3.6 3.5 - 5.2 g/dL    Phosphorus 5.0 (H) 2.7 - 4.5 mg/dL    eGFR if  11.3 (A) >60 mL/min/1.73 m^2    eGFR if non  9.8 (A) >60 mL/min/1.73 m^2    Anion Gap 8 8 - 16 mmol/L       Microbiology Results (last 7 days)     Procedure Component Value Units Date/Time    Urine culture [[id number]] Collected:  07/19/18 1330    Order Status:  No result Specimen:  Urine Updated:  07/19/18 1443    Urine culture [[id number]]     Order Status:  Completed Specimen:  Urine     Urine culture **CANNOT BE ORDERED STAT** [[id number]] Collected:  07/19/18 1108    Order Status:  Sent Specimen:  Urine from Urine, Clean Catch Updated:  07/19/18 1109            Diagnostic Results:  US Retroperitoneal:  Findings suggestive of chronic medical renal disease.  No hydronephrosis.

## 2018-07-20 NOTE — SUBJECTIVE & OBJECTIVE
Past Medical History:   Diagnosis Date    Acid reflux     Alopecia     Anemia     Anxiety     Arthritis     Blood transfusion     Chronic kidney disease     Deep vein thrombosis 2012    left leg; completed anticoagulation; seen by heme/onc    Dry eyes     Dry mouth     Lupus        Past Surgical History:   Procedure Laterality Date     SECTION      SKIN SURGERY      to frontal head    TUBAL LIGATION         Review of patient's allergies indicates:   Allergen Reactions    Ciprofloxacin Other (See Comments)     Face and lips became swollen and throat closed    Avelox [moxifloxacin] Rash    Iodinated contrast- oral and iv dye Itching and Swelling    Reglan [metoclopramide hcl] Other (See Comments)     Nervous and paranoid    Amoxicillin      Other reaction(s): Unknown     Current Facility-Administered Medications   Medication Frequency    0.9%  NaCl infusion (for blood administration) Q24H PRN    acetaminophen tablet 650 mg Q4H PRN    cefTRIAXone injection 1 g Q24H    HYDROcodone-acetaminophen 5-325 mg per tablet 1 tablet Q4H PRN    hydrocortisone 2.5 % rectal cream BID    mycophenolate capsule 500 mg QHS    ondansetron injection 4 mg Q8H PRN    oxyCODONE immediate release tablet 15 mg Q4H PRN    sodium chloride 0.9% flush 3 mL Q6H PRN     Family History     Problem Relation (Age of Onset)    Colon cancer Maternal Aunt, Maternal Grandfather    Diabetes Sister    Diabetes Mellitus Sister, Sister    Heart disease Mother    Hypertension Sister    Kidney disease Mother, Sister    Rheum arthritis Mother        Social History Main Topics    Smoking status: Never Smoker    Smokeless tobacco: Never Used    Alcohol use No    Drug use: No    Sexual activity: Not Currently     Review of Systems   Constitutional: Positive for activity change and fatigue. Negative for fever.   HENT: Negative for congestion.    Eyes: Negative for discharge.   Respiratory: Positive for shortness of breath.  Negative for cough and chest tightness.    Cardiovascular: Negative for chest pain and leg swelling.   Gastrointestinal: Negative for abdominal distention and abdominal pain.   Endocrine: Negative for cold intolerance.   Genitourinary: Negative for difficulty urinating, dysuria, frequency, hematuria and urgency.   Musculoskeletal: Negative for arthralgias.   Skin: Negative for rash.        Hair loss   Allergic/Immunologic: Negative for environmental allergies.   Neurological: Positive for light-headedness.   Hematological: Negative for adenopathy.   Psychiatric/Behavioral: Negative for behavioral problems.     Objective:     Vital Signs (Most Recent):  Temp: 97.6 °F (36.4 °C) (07/20/18 0554)  Pulse: (!) 55 (07/20/18 0554)  Resp: 20 (07/20/18 0554)  BP: (!) 160/72 (07/20/18 0554)  SpO2: 97 % (07/20/18 0554)  O2 Device (Oxygen Therapy): room air (07/20/18 0554) Vital Signs (24h Range):  Temp:  [96.3 °F (35.7 °C)-98.9 °F (37.2 °C)] 97.6 °F (36.4 °C)  Pulse:  [55-76] 55  Resp:  [20] 20  SpO2:  [97 %-100 %] 97 %  BP: (120-160)/(59-75) 160/72     Weight: 78.7 kg (173 lb 9.6 oz) (07/19/18 1230)  Body mass index is 28.89 kg/m².  Body surface area is 1.9 meters squared.    I/O last 3 completed shifts:  In: 1571.3 [P.O.:840; Blood:231.3; IV Piggyback:500]  Out: 300 [Urine:300]    Physical Exam   Constitutional: She is oriented to person, place, and time. No distress.   HENT:   Head: Normocephalic and atraumatic.   Right Ear: External ear normal.   Left Ear: External ear normal.   Eyes: Conjunctivae are normal. Left eye exhibits no discharge. No scleral icterus.   Neck: Normal range of motion. Neck supple. No JVD present.   Cardiovascular: Normal rate, regular rhythm and normal heart sounds.    Pulmonary/Chest: Effort normal and breath sounds normal.   Abdominal: Soft. Bowel sounds are normal. She exhibits no distension. There is no tenderness.   Musculoskeletal: Normal range of motion. She exhibits no edema.   Neurological:  She is alert and oriented to person, place, and time.   Skin: Skin is warm and dry.   Psychiatric: She has a normal mood and affect. Her behavior is normal.       Significant Labs:  ABGs: No results for input(s): PH, PCO2, HCO3, POCSATURATED, BE in the last 168 hours.  CBC:   Recent Labs  Lab 07/20/18  0844   WBC 7.58   RBC 2.88*   HGB 9.0*   HCT 26.1*      MCV 91   MCH 31.3*   MCHC 34.5     CMP:   Recent Labs  Lab 07/19/18  0908      CALCIUM 9.1   ALBUMIN 4.0   PROT 7.9      K 4.5   CO2 15*   *   BUN 60*   CREATININE 5.5*   ALKPHOS 56   ALT 10   AST 22   BILITOT 0.9     Coagulation: No results for input(s): PT, INR, APTT in the last 168 hours.  LFTs:   Recent Labs  Lab 07/19/18  0908   ALT 10   AST 22   ALKPHOS 56   BILITOT 0.9   PROT 7.9   ALBUMIN 4.0     Microbiology Results (last 7 days)     Procedure Component Value Units Date/Time    Urine culture [[id number]] Collected:  07/19/18 1330    Order Status:  No result Specimen:  Urine Updated:  07/19/18 1443    Urine culture [[id number]]     Order Status:  Completed Specimen:  Urine     Urine culture **CANNOT BE ORDERED STAT** [[id number]] Collected:  07/19/18 1108    Order Status:  Sent Specimen:  Urine from Urine, Clean Catch Updated:  07/19/18 1109          Recent Labs  Lab 07/19/18  1108   COLORU Yellow   SPECGRAV 1.010   PHUR 5.0   PROTEINUA 2+*   BACTERIA Many*   NITRITE Positive*   LEUKOCYTESUR 3+*   UROBILINOGEN Negative   HYALINECASTS 0       Significant Imaging:  X-Ray: Reviewed  No acute abnormality   U/S 7/19: chronic renal disease, no hydronephrosis

## 2018-07-20 NOTE — ASSESSMENT & PLAN NOTE
Pt denies seeing any blood at home so I do not think this is the cause of her anemia.    Still painful  -- topical steroids for hemorrhoidal pain

## 2018-07-20 NOTE — ASSESSMENT & PLAN NOTE
Ms. Bates is a 53 year old female with medical history of SLE (malar rash, low wct count, arthritis, photosensitivity, hair loss, dry eyes/mouth, raynauds, +ANA1:1280 +SSA, +RNP), lupus nephritis class 4 (follows with [facility]), history of hemolytic anemia (treated with prednisone) who presented to the ED on 7/19 for shortness of breath, fatigue, generalized weakness. She was found to have hgb of 6.2 (9 in Jan 2018), with no signs of bleeding. 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 HER SLEDAI is 8 (arthritis and proteinuria) although her proteinuria has improved. Her complements are normal, dsDNA negative. This is likely a mild flare in context of not taking cellcept for 2 weeks.     Treatment this far:  Solumedrol x1 125mg on 7/19  Prednisone 60mg daily on 7/20  cellcept 500mg daily on 7/19   PLQ on 7/20    Plan:  -appreciate heme/oncs recommendations   -continue prednisone 60mg daily at this time.   -continue cellcept 500mg daily   -continue PLQ, dose adjust due to renal function to 200mg daily

## 2018-07-21 PROBLEM — N17.8 ACUTE RENAL FAILURE WITH SPECIFIED LESION: Status: ACTIVE | Noted: 2018-07-21

## 2018-07-21 LAB
ALBUMIN SERPL BCP-MCNC: 3.4 G/DL
ANION GAP SERPL CALC-SCNC: 10 MMOL/L
BASOPHILS # BLD AUTO: 0 K/UL
BASOPHILS NFR BLD: 0 %
BUN SERPL-MCNC: 62 MG/DL
CALCIUM SERPL-MCNC: 8.4 MG/DL
CHLORIDE SERPL-SCNC: 113 MMOL/L
CO2 SERPL-SCNC: 18 MMOL/L
CREAT SERPL-MCNC: 4.6 MG/DL
DIFFERENTIAL METHOD: ABNORMAL
EOSINOPHIL # BLD AUTO: 0 K/UL
EOSINOPHIL NFR BLD: 0 %
ERYTHROCYTE [DISTWIDTH] IN BLOOD BY AUTOMATED COUNT: 16.5 %
EST. GFR  (AFRICAN AMERICAN): 11.6 ML/MIN/1.73 M^2
EST. GFR  (NON AFRICAN AMERICAN): 10 ML/MIN/1.73 M^2
GLUCOSE SERPL-MCNC: 127 MG/DL
HCT VFR BLD AUTO: 25.4 %
HGB BLD-MCNC: 8.7 G/DL
IMM GRANULOCYTES # BLD AUTO: 0.12 K/UL
IMM GRANULOCYTES NFR BLD AUTO: 1.1 %
LYMPHOCYTES # BLD AUTO: 1 K/UL
LYMPHOCYTES NFR BLD: 9.1 %
MCH RBC QN AUTO: 31.5 PG
MCHC RBC AUTO-ENTMCNC: 34.3 G/DL
MCV RBC AUTO: 92 FL
MONOCYTES # BLD AUTO: 0.7 K/UL
MONOCYTES NFR BLD: 5.8 %
NEUTROPHILS # BLD AUTO: 9.4 K/UL
NEUTROPHILS NFR BLD: 84 %
NRBC BLD-RTO: 0 /100 WBC
PHOSPHATE SERPL-MCNC: 5.5 MG/DL
PLATELET # BLD AUTO: 327 K/UL
PMV BLD AUTO: 10.2 FL
POTASSIUM SERPL-SCNC: 5.2 MMOL/L
RBC # BLD AUTO: 2.76 M/UL
SODIUM SERPL-SCNC: 141 MMOL/L
WBC # BLD AUTO: 11.13 K/UL

## 2018-07-21 PROCEDURE — 80069 RENAL FUNCTION PANEL: CPT

## 2018-07-21 PROCEDURE — 99232 SBSQ HOSP IP/OBS MODERATE 35: CPT | Mod: ,,, | Performed by: HOSPITALIST

## 2018-07-21 PROCEDURE — 63600175 PHARM REV CODE 636 W HCPCS: Performed by: HOSPITALIST

## 2018-07-21 PROCEDURE — 36415 COLL VENOUS BLD VENIPUNCTURE: CPT

## 2018-07-21 PROCEDURE — 85025 COMPLETE CBC W/AUTO DIFF WBC: CPT

## 2018-07-21 PROCEDURE — 99231 SBSQ HOSP IP/OBS SF/LOW 25: CPT | Mod: ,,, | Performed by: INTERNAL MEDICINE

## 2018-07-21 PROCEDURE — 25000003 PHARM REV CODE 250: Performed by: HOSPITALIST

## 2018-07-21 PROCEDURE — 11000001 HC ACUTE MED/SURG PRIVATE ROOM

## 2018-07-21 PROCEDURE — 25000003 PHARM REV CODE 250

## 2018-07-21 RX ORDER — SODIUM BICARBONATE 650 MG/1
1300 TABLET ORAL 2 TIMES DAILY
Status: DISCONTINUED | OUTPATIENT
Start: 2018-07-21 | End: 2018-07-24 | Stop reason: HOSPADM

## 2018-07-21 RX ADMIN — HYDROCORTISONE 2.5%: 25 CREAM TOPICAL at 09:07

## 2018-07-21 RX ADMIN — SODIUM BICARBONATE 650 MG TABLET 1300 MG: at 09:07

## 2018-07-21 RX ADMIN — PANTOPRAZOLE SODIUM 40 MG: 40 TABLET, DELAYED RELEASE ORAL at 08:07

## 2018-07-21 RX ADMIN — HYDROCODONE BITARTRATE AND ACETAMINOPHEN 1 TABLET: 5; 325 TABLET ORAL at 04:07

## 2018-07-21 RX ADMIN — FOLIC ACID 1 MG: 1 TABLET ORAL at 08:07

## 2018-07-21 RX ADMIN — HYDROCODONE BITARTRATE AND ACETAMINOPHEN 1 TABLET: 5; 325 TABLET ORAL at 07:07

## 2018-07-21 RX ADMIN — SODIUM BICARBONATE 650 MG TABLET 650 MG: at 07:07

## 2018-07-21 RX ADMIN — PREDNISONE 60 MG: 20 TABLET ORAL at 08:07

## 2018-07-21 RX ADMIN — MEROPENEM 1 G: 1 INJECTION, POWDER, FOR SOLUTION INTRAVENOUS at 03:07

## 2018-07-21 RX ADMIN — MEROPENEM 1 G: 1 INJECTION, POWDER, FOR SOLUTION INTRAVENOUS at 04:07

## 2018-07-21 RX ADMIN — HYDROCORTISONE 2.5%: 25 CREAM TOPICAL at 02:07

## 2018-07-21 RX ADMIN — OXYCODONE HYDROCHLORIDE 15 MG: 5 TABLET ORAL at 07:07

## 2018-07-21 RX ADMIN — HYDROCODONE BITARTRATE AND ACETAMINOPHEN 1 TABLET: 5; 325 TABLET ORAL at 12:07

## 2018-07-21 RX ADMIN — SODIUM POLYSTYRENE SULFONATE 30 G: 15 SUSPENSION ORAL; RECTAL at 09:07

## 2018-07-21 RX ADMIN — HYDROCODONE BITARTRATE AND ACETAMINOPHEN 1 TABLET: 5; 325 TABLET ORAL at 02:07

## 2018-07-21 RX ADMIN — HYDROCODONE BITARTRATE AND ACETAMINOPHEN 1 TABLET: 5; 325 TABLET ORAL at 09:07

## 2018-07-21 NOTE — ASSESSMENT & PLAN NOTE
Class 4 lupus nephritis   Follows with [facility] nephrology and is enrolled in a study   Cr increased from 3 in Jan 2018 to 5.5 on admission, now decreasing (likely pre-renal component)  UPC 1 in Jan, currently 0.61  Continue cellcept 500mg nightly   Appreciate Renal recs

## 2018-07-21 NOTE — PLAN OF CARE
Problem: Patient Care Overview  Goal: Plan of Care Review  Outcome: Ongoing (interventions implemented as appropriate)   07/20/18 6613   Coping/Psychosocial   Plan Of Care Reviewed With patient;spouse       Problem: Pressure Ulcer Risk (Claudio Scale) (Adult,Obstetrics,Pediatric)  Goal: Identify Related Risk Factors and Signs and Symptoms  Related risk factors and signs and symptoms are identified upon initiation of Human Response Clinical Practice Guideline (CPG)   Outcome: Ongoing (interventions implemented as appropriate)   07/21/18 0027   Pressure Ulcer Risk (Claudio Scale)   Related Risk Factors (Pressure Ulcer Risk (Claudio Scale)) medication;mobility impaired       Problem: Infection, Risk/Actual (Adult)  Goal: Identify Related Risk Factors and Signs and Symptoms  Related risk factors and signs and symptoms are identified upon initiation of Human Response Clinical Practice Guideline (CPG)   Outcome: Ongoing (interventions implemented as appropriate)   07/21/18 0027   Infection, Risk/Actual   Related Risk Factors (Infection, Risk/Actual) chronic illness/condition;medication effects   Signs and Symptoms (Infection, Risk/Actual) pain       Problem: Constipation (Adult)  Goal: Identify Related Risk Factors and Signs and Symptoms  Related risk factors and signs and symptoms are identified upon initiation of Human Response Clinical Practice Guideline (CPG)   Outcome: Ongoing (interventions implemented as appropriate)   07/21/18 0027   Constipation   Related Risk Factors (Constipation) medication effects;painful defecation   Signs and Symptoms (Constipation) rectal pain/pressure;straining       Problem: Pain, Acute (Adult)  Goal: Identify Related Risk Factors and Signs and Symptoms  Related risk factors and signs and symptoms are identified upon initiation of Human Response Clinical Practice Guideline (CPG)   Outcome: Ongoing (interventions implemented as appropriate)   07/21/18 0027   Pain, Acute   Related Risk  Factors (Acute Pain) persistent pain   Signs and Symptoms (Acute Pain) constipation/diarrhea;sleep pattern alteration;moaning       Problem: Anemia (Adult)  Goal: Identify Related Risk Factors and Signs and Symptoms  Related risk factors and signs and symptoms are identified upon initiation of Human Response Clinical Practice Guideline (CPG)   Outcome: Ongoing (interventions implemented as appropriate)   07/21/18 0027   Anemia   Related Risk Factors (Anemia) chronic illness   Signs and Symptoms (Anemia) fatigue

## 2018-07-21 NOTE — SUBJECTIVE & OBJECTIVE
Interval History: no acute overnight events. Pt received transfusion yesterday. Today Hgb 8.7 from 9.5. Her Cr is improving at 4.6. She states she is overall feeling fine but does state that she has some chest pain. No new rash, or joint pain.     Current Facility-Administered Medications   Medication Frequency    0.9%  NaCl infusion (for blood administration) Q24H PRN    acetaminophen tablet 650 mg Q4H PRN    folic acid tablet 1 mg Daily    HYDROcodone-acetaminophen 5-325 mg per tablet 1 tablet Q4H PRN    hydrocortisone 2.5 % rectal cream BID    meropenem 1 g in sodium chloride 0.9 % 100 mL IVPB (ready to mix system) Q12H    mycophenolate capsule 500 mg QHS    ondansetron injection 4 mg Q8H PRN    oxyCODONE immediate release tablet 15 mg Q4H PRN    pantoprazole EC tablet 40 mg Daily    predniSONE tablet 60 mg Daily    sodium bicarbonate tablet 1,300 mg BID    sodium chloride 0.9% flush 3 mL Q6H PRN     Objective:     Vital Signs (Most Recent):  Temp: 97.8 °F (36.6 °C) (07/21/18 1215)  Pulse: 68 (07/21/18 1215)  Resp: 20 (07/21/18 1215)  BP: (!) 167/81 (07/21/18 1215)  SpO2: 98 % (07/21/18 1215)  O2 Device (Oxygen Therapy): room air (07/21/18 1215) Vital Signs (24h Range):  Temp:  [96.9 °F (36.1 °C)-98.2 °F (36.8 °C)] 97.8 °F (36.6 °C)  Pulse:  [56-77] 68  Resp:  [16-20] 20  SpO2:  [97 %-99 %] 98 %  BP: (124-167)/(62-82) 167/81     Weight: 78.7 kg (173 lb 9.6 oz) (07/19/18 1230)  Body mass index is 28.89 kg/m².  Body surface area is 1.9 meters squared.      Intake/Output Summary (Last 24 hours) at 07/21/18 1215  Last data filed at 07/21/18 0600   Gross per 24 hour   Intake              990 ml   Output                0 ml   Net              990 ml       Physical Exam   Constitutional: She is oriented to person, place, and time and well-developed, well-nourished, and in no distress. No distress.   HENT:   Head: Normocephalic and atraumatic.   Mouth/Throat: Oropharynx is clear and moist. No oropharyngeal  exudate.   Eyes: Conjunctivae and EOM are normal. Pupils are equal, round, and reactive to light. No scleral icterus.   Neck: Normal range of motion.   Cardiovascular: Normal rate and regular rhythm.    No murmur heard.  Pulmonary/Chest: Effort normal and breath sounds normal. No respiratory distress. She has no wheezes. She has no rales.   Abdominal: Soft. Bowel sounds are normal. She exhibits no distension. There is no tenderness.       Right Side Rheumatological Exam     Examination finds the shoulder, elbow, wrist, knee, 1st PIP, 1st MCP, 3rd PIP, 4th PIP, 4th MCP, 5th PIP and 5th MCP normal.    The patient is tender to palpation of the 2nd PIP, 2nd MCP and 3rd MCP    She has swelling of the 2nd PIP, 2nd MCP and 3rd MCP    Muscle Strength (0-5 scale):  Biceps: 5/5   Distal Lower Extremity: 5    Left Side Rheumatological Exam     Examination finds the shoulder, elbow, wrist, knee, 1st PIP, 1st MCP, 2nd PIP, 2nd MCP, 3rd PIP, 3rd MCP, 4th PIP, 4th MCP, 5th PIP and 5th MCP normal.    Muscle Strength (0-5 scale):  Biceps: 5/5   Distal Lower Extremity: 5      Neurological: She is alert and oriented to person, place, and time. No cranial nerve deficit.   Skin: Skin is warm and dry. No rash noted. She is not diaphoretic. No erythema.     Musculoskeletal: Normal range of motion. She exhibits no edema.         Significant Labs:  BMP:   Recent Labs  Lab 07/21/18 0513   *      K 5.2*   *   CO2 18*   BUN 62*   CREATININE 4.6*   CALCIUM 8.4*     CBC:   Recent Labs  Lab 07/20/18  1322 07/21/18  0513   WBC  --  11.13   HGB 9.5* 8.7*   HCT  --  25.4*   PLT  --  327     CMP:   Recent Labs  Lab 07/21/18 0513   *   CALCIUM 8.4*   ALBUMIN 3.4*      K 5.2*   CO2 18*   *   BUN 62*   CREATININE 4.6*       Significant Imaging:  Imaging results within the past 24 hours have been reviewed.

## 2018-07-21 NOTE — ASSESSMENT & PLAN NOTE
Seen by heme in the past.  By history (no melena or BRBPR) this is either a hemolytic anemia or a very slow GIB.  Haptoglobin <10, ldh elevated, edmundo +,  and retic high at 3.9.  Received two units of PRBC.  - monitor  - needs c-scope as outpt for screening too due to her age  - hem consulted for hemolysis

## 2018-07-21 NOTE — SUBJECTIVE & OBJECTIVE
Interval History:  Symptoms:   Same weakness.  Diet:  Adequate intake.    Activity level:  Impaired due to weakness.   Pain:  No pain.       Review of Systems   Constitutional: Positive for fatigue. Negative for fever.   HENT: Negative for congestion.    Eyes: Negative for discharge.   Respiratory: Negative for shortness of breath.    Cardiovascular: Negative for chest pain.   Gastrointestinal: Negative for abdominal pain.   Genitourinary: Negative for difficulty urinating.   Musculoskeletal: Negative for arthralgias.   Skin:        Hair loss     Objective:     Vital Signs (Most Recent):  Temp: 97.5 °F (36.4 °C) (07/21/18 0757)  Pulse: 64 (07/21/18 0757)  Resp: 17 (07/21/18 0757)  BP: (!) 154/81 (07/21/18 0757)  SpO2: 98 % (07/21/18 0757) Vital Signs (24h Range):  Temp:  [96.8 °F (36 °C)-98.2 °F (36.8 °C)] 97.5 °F (36.4 °C)  Pulse:  [56-77] 64  Resp:  [16-18] 17  SpO2:  [97 %-99 %] 98 %  BP: (124-154)/(62-82) 154/81     Weight: 78.7 kg (173 lb 9.6 oz)  Body mass index is 28.89 kg/m².    Physical Exam   Constitutional: She is oriented to person, place, and time. No distress.   Cardiovascular: Normal rate, regular rhythm and normal heart sounds.    Pulmonary/Chest: Effort normal and breath sounds normal.   Abdominal: Soft. Bowel sounds are normal.   Musculoskeletal: She exhibits no edema.   Neurological: She is alert and oriented to person, place, and time.   Psychiatric: She has a normal mood and affect. Her behavior is normal.           Significant Labs: All pertinent labs within the past 24 hours have been reviewed.    Significant Imaging: I have reviewed and interpreted all pertinent imaging results/findings within the past 24 hours.

## 2018-07-21 NOTE — ASSESSMENT & PLAN NOTE
Ms. Bates is a 53 year old female with medical history of SLE (malar rash, low wct count, arthritis, photosensitivity, hair loss, dry eyes/mouth, raynauds, +ANA1:1280 +SSA, +RNP), lupus nephritis class 4 (follows with [facility]), history of hemolytic anemia (treated with prednisone) who presented to the ED on 7/19 for shortness of breath, fatigue, generalized weakness. She was found to have hgb of 6.2 (9 in Jan 2018), with no signs of bleeding. 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 HER SLEDAI is 8 (arthritis and proteinuria) although her proteinuria has improved. Her complements are normal, dsDNA negative. This is likely a mild flare in context of not taking cellcept for 2 weeks.     Treatment this far:  Solumedrol x1 125mg on 7/19  Prednisone 60mg daily on 7/20  cellcept 500mg daily on 7/19     Plan:  -appreciate heme/oncs recommendations   -continue prednisone 60mg daily at this time.   -continue cellcept 500mg daily   -She will need follow up with Dr. Dickerson when she is ready for discharge

## 2018-07-21 NOTE — ASSESSMENT & PLAN NOTE
Pt with hair loss and weakness typical of her lupus flares while off her medications  -- rheum consult apprecaited  - resume her home cellcept  - started on prednisone 60 qd

## 2018-07-21 NOTE — PROGRESS NOTES
[facility] Hospital Medicine  Progress Note    Patient Name: Talya Bates  MRN: [id number]  Patient Class: IP- Inpatient   Admission Date: 7/19/2018  Length of Stay: 2 days  Attending Physician: Russell Guadarrama MD  Primary Care Provider: Lee Mcnair MD    [facility] Medicine Team: [facility] D Russell Guadarrama MD    Subjective:     Principal Problem:Lupus (systemic lupus erythematosus)    HPI:  This is a 53-year-old female with a PMHx of Lupus, DVT (2012, not on anticoagulants), and anemia who presents to the ED with SOB. This morning at 4:30 AM, she woke with SOB, fatigue, generalized weakness, and lightheadedness, which has been persistent since. She reports SOB at rest and with exertion.     She denies a sedentary lifestyle, HRT, or recent long travel. She also has had hemorrhoid pain, mild upper abdominal pain, diarrhea, and decreased appetite for the past 2 weeks. Her last received a blood transfusion 6 years ago, shortly after being diagnosed with a DVT. She denies blood in her stool, melena, fevers, chills, chest pain, headache, dizziness, or any other medical complaints.     She ran our of her cellcept two weeks ago and is waiting on her MD to renew it.  She has had some hair loss in that time, but no joint pain or rash.  Her flares usually present with weakness such as this.    Hospital Course:  See problem list    Interval History:  Symptoms:   Same weakness.  Diet:  Adequate intake.    Activity level:  Impaired due to weakness.   Pain:  No pain.       Review of Systems   Constitutional: Positive for fatigue. Negative for fever.   HENT: Negative for congestion.    Eyes: Negative for discharge.   Respiratory: Negative for shortness of breath.    Cardiovascular: Negative for chest pain.   Gastrointestinal: Negative for abdominal pain.   Genitourinary: Negative for difficulty urinating.   Musculoskeletal: Negative for arthralgias.   Skin:        Hair loss     Objective:      Vital Signs (Most Recent):  Temp: 97.5 °F (36.4 °C) (07/21/18 0757)  Pulse: 64 (07/21/18 0757)  Resp: 17 (07/21/18 0757)  BP: (!) 154/81 (07/21/18 0757)  SpO2: 98 % (07/21/18 0757) Vital Signs (24h Range):  Temp:  [96.8 °F (36 °C)-98.2 °F (36.8 °C)] 97.5 °F (36.4 °C)  Pulse:  [56-77] 64  Resp:  [16-18] 17  SpO2:  [97 %-99 %] 98 %  BP: (124-154)/(62-82) 154/81     Weight: 78.7 kg (173 lb 9.6 oz)  Body mass index is 28.89 kg/m².    Physical Exam   Constitutional: She is oriented to person, place, and time. No distress.   Cardiovascular: Normal rate, regular rhythm and normal heart sounds.    Pulmonary/Chest: Effort normal and breath sounds normal.   Abdominal: Soft. Bowel sounds are normal.   Musculoskeletal: She exhibits no edema.   Neurological: She is alert and oriented to person, place, and time.   Psychiatric: She has a normal mood and affect. Her behavior is normal.           Significant Labs: All pertinent labs within the past 24 hours have been reviewed.    Significant Imaging: I have reviewed and interpreted all pertinent imaging results/findings within the past 24 hours.    Assessment/Plan:      * Lupus (systemic lupus erythematosus)    Pt with hair loss and weakness typical of her lupus flares while off her medications  -- rheum consult apprecaited  - resume her home cellcept  - started on prednisone 60 qd          Hyperkalemia    K 5.5 on 7/20  - kayexalate and telemetry          Acute cystitis without hematuria    UA is +N with >100 WBC  -- talked to ID pharmacist who recommends meropenem due to MARKEL and drug allergies          Bleeding hemorrhoid    Pt denies seeing any blood at home so I do not think this is the cause of her anemia.    Still painful  -- topical steroids for hemorrhoidal pain        Lupus nephritis    CKD (chronic kidney disease), stage IV with MARKEL  Baseline Cr of 2.5 now up to 5.5.  P:CR elevated at 0.61.  Renal US ok.  - renal consult given lupus, low bicarb,and high BUN  - started  on bicarb for metabolic acidosis          Hemolytic anemia    Seen by heme in the past.  By history (no melena or BRBPR) this is either a hemolytic anemia or a very slow GIB.  Haptoglobin <10, ldh elevated, edmundo +,  and retic high at 3.9.  Received two units of PRBC.  - monitor  - needs c-scope as outpt for screening too due to her age  - hem consulted for hemolysis            VTE Risk Mitigation         Ordered     IP VTE HIGH RISK PATIENT  Once      07/19/18 1250     Place AZUL hose  Until discontinued      07/19/18 1250     Place AZUL hose  Until discontinued      07/19/18 1138     Place sequential compression device  Until discontinued      07/19/18 1138     Reason for No Pharmacological VTE Prophylaxis  Once      07/19/18 1138              Russell Guadarrama MD  Department of Hospital Medicine   [facility]

## 2018-07-21 NOTE — NURSING
Patient reports bleeding from hemorrhoids.  RN encourage patient to call RN to observe if happens again.

## 2018-07-21 NOTE — PROGRESS NOTES
[facility]  Rheumatology  Progress Note    Patient Name: Talya Bates  MRN: [id number]  Admission Date: 7/19/2018  Hospital Length of Stay: 2 days  Code Status: Full Code   Attending Provider: Russell Guadarrama MD  Primary Care Physician: Lee Mcnair MD  Principal Problem: Lupus (systemic lupus erythematosus)    Subjective:     Interval History: no acute overnight events. Pt received transfusion yesterday. Today Hgb 8.7 from 9.5. Her Cr is improving at 4.6. She states she is overall feeling fine but does state that she has some chest pain. No new rash, or joint pain.     Current Facility-Administered Medications   Medication Frequency    0.9%  NaCl infusion (for blood administration) Q24H PRN    acetaminophen tablet 650 mg Q4H PRN    folic acid tablet 1 mg Daily    HYDROcodone-acetaminophen 5-325 mg per tablet 1 tablet Q4H PRN    hydrocortisone 2.5 % rectal cream BID    meropenem 1 g in sodium chloride 0.9 % 100 mL IVPB (ready to mix system) Q12H    mycophenolate capsule 500 mg QHS    ondansetron injection 4 mg Q8H PRN    oxyCODONE immediate release tablet 15 mg Q4H PRN    pantoprazole EC tablet 40 mg Daily    predniSONE tablet 60 mg Daily    sodium bicarbonate tablet 1,300 mg BID    sodium chloride 0.9% flush 3 mL Q6H PRN     Objective:     Vital Signs (Most Recent):  Temp: 97.8 °F (36.6 °C) (07/21/18 1215)  Pulse: 68 (07/21/18 1215)  Resp: 20 (07/21/18 1215)  BP: (!) 167/81 (07/21/18 1215)  SpO2: 98 % (07/21/18 1215)  O2 Device (Oxygen Therapy): room air (07/21/18 1215) Vital Signs (24h Range):  Temp:  [96.9 °F (36.1 °C)-98.2 °F (36.8 °C)] 97.8 °F (36.6 °C)  Pulse:  [56-77] 68  Resp:  [16-20] 20  SpO2:  [97 %-99 %] 98 %  BP: (124-167)/(62-82) 167/81     Weight: 78.7 kg (173 lb 9.6 oz) (07/19/18 1230)  Body mass index is 28.89 kg/m².  Body surface area is 1.9 meters squared.      Intake/Output Summary (Last 24 hours) at 07/21/18 1215  Last data filed at 07/21/18 0600    Gross per 24 hour   Intake              990 ml   Output                0 ml   Net              990 ml       Physical Exam   Constitutional: She is oriented to person, place, and time and well-developed, well-nourished, and in no distress. No distress.   HENT:   Head: Normocephalic and atraumatic.   Mouth/Throat: Oropharynx is clear and moist. No oropharyngeal exudate.   Eyes: Conjunctivae and EOM are normal. Pupils are equal, round, and reactive to light. No scleral icterus.   Neck: Normal range of motion.   Cardiovascular: Normal rate and regular rhythm.    No murmur heard.  Pulmonary/Chest: Effort normal and breath sounds normal. No respiratory distress. She has no wheezes. She has no rales.   Abdominal: Soft. Bowel sounds are normal. She exhibits no distension. There is no tenderness.       Right Side Rheumatological Exam     Examination finds the shoulder, elbow, wrist, knee, 1st PIP, 1st MCP, 3rd PIP, 4th PIP, 4th MCP, 5th PIP and 5th MCP normal.    The patient is tender to palpation of the 2nd PIP, 2nd MCP and 3rd MCP    She has swelling of the 2nd PIP, 2nd MCP and 3rd MCP    Muscle Strength (0-5 scale):  Biceps: 5/5   Distal Lower Extremity: 5    Left Side Rheumatological Exam     Examination finds the shoulder, elbow, wrist, knee, 1st PIP, 1st MCP, 2nd PIP, 2nd MCP, 3rd PIP, 3rd MCP, 4th PIP, 4th MCP, 5th PIP and 5th MCP normal.    Muscle Strength (0-5 scale):  Biceps: 5/5   Distal Lower Extremity: 5      Neurological: She is alert and oriented to person, place, and time. No cranial nerve deficit.   Skin: Skin is warm and dry. No rash noted. She is not diaphoretic. No erythema.     Musculoskeletal: Normal range of motion. She exhibits no edema.         Significant Labs:  BMP:   Recent Labs  Lab 07/21/18  0513   *      K 5.2*   *   CO2 18*   BUN 62*   CREATININE 4.6*   CALCIUM 8.4*     CBC:   Recent Labs  Lab 07/20/18  1322 07/21/18  0513   WBC  --  11.13   HGB 9.5* 8.7*   HCT  --  25.4*    PLT  --  327     CMP:   Recent Labs  Lab 07/21/18  0513   *   CALCIUM 8.4*   ALBUMIN 3.4*      K 5.2*   CO2 18*   *   BUN 62*   CREATININE 4.6*       Significant Imaging:  Imaging results within the past 24 hours have been reviewed.    Assessment/Plan:     * Lupus (systemic lupus erythematosus)    Ms. Bates is a 53 year old female with medical history of SLE (malar rash, low wct count, arthritis, photosensitivity, hair loss, dry eyes/mouth, raynauds, +ANA1:1280 +SSA, +RNP), lupus nephritis class 4 (follows with [facility]), history of hemolytic anemia (treated with prednisone) who presented to the ED on 7/19 for shortness of breath, fatigue, generalized weakness. She was found to have hgb of 6.2 (9 in Jan 2018), with no signs of bleeding. 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 HER SLEDAI is 8 (arthritis and proteinuria) although her proteinuria has improved. Her complements are normal, dsDNA negative. This is likely a mild flare in context of not taking cellcept for 2 weeks.     Treatment this far:  Solumedrol x1 125mg on 7/19  Prednisone 60mg daily on 7/20  cellcept 500mg daily on 7/19     Plan:  -appreciate heme/oncs recommendations   -continue prednisone 60mg daily at this time.   -continue cellcept 500mg daily   -She will need follow up with Dr. Dickerson when she is ready for discharge         Lupus nephritis    Class 4 lupus nephritis   Follows with [facility] nephrology and is enrolled in a study   Cr increased from 3 in Jan 2018 to 5.5 on admission, now decreasing (likely pre-renal component)  UPC 1 in Jan, currently 0.61  Continue cellcept 500mg nightly   Appreciate Renal recs         Hemolytic anemia    Likely secondary to SLE   Appreciate Heme/onc recommendations   Continue prednisone 60mg               Sue Hatfield MD  Rheumatology  [facility]

## 2018-07-22 LAB
BACTERIA UR CULT: NORMAL
BASOPHILS # BLD AUTO: 0.01 K/UL
BASOPHILS NFR BLD: 0.1 %
DIFFERENTIAL METHOD: ABNORMAL
EOSINOPHIL # BLD AUTO: 0 K/UL
EOSINOPHIL NFR BLD: 0 %
ERYTHROCYTE [DISTWIDTH] IN BLOOD BY AUTOMATED COUNT: 16.9 %
HCT VFR BLD AUTO: 22 %
HGB BLD-MCNC: 7.6 G/DL
IMM GRANULOCYTES # BLD AUTO: 0.15 K/UL
IMM GRANULOCYTES NFR BLD AUTO: 1.3 %
LYMPHOCYTES # BLD AUTO: 1.4 K/UL
LYMPHOCYTES NFR BLD: 12.1 %
MCH RBC QN AUTO: 31.7 PG
MCHC RBC AUTO-ENTMCNC: 34.5 G/DL
MCV RBC AUTO: 92 FL
MONOCYTES # BLD AUTO: 0.8 K/UL
MONOCYTES NFR BLD: 6.7 %
NEUTROPHILS # BLD AUTO: 9.4 K/UL
NEUTROPHILS NFR BLD: 79.8 %
NRBC BLD-RTO: 0 /100 WBC
PLATELET # BLD AUTO: 273 K/UL
PMV BLD AUTO: 10.1 FL
RBC # BLD AUTO: 2.4 M/UL
WBC # BLD AUTO: 11.81 K/UL

## 2018-07-22 PROCEDURE — 63600175 PHARM REV CODE 636 W HCPCS: Performed by: HOSPITALIST

## 2018-07-22 PROCEDURE — 25000003 PHARM REV CODE 250: Performed by: HOSPITALIST

## 2018-07-22 PROCEDURE — 36415 COLL VENOUS BLD VENIPUNCTURE: CPT

## 2018-07-22 PROCEDURE — 11000001 HC ACUTE MED/SURG PRIVATE ROOM

## 2018-07-22 PROCEDURE — 99233 SBSQ HOSP IP/OBS HIGH 50: CPT | Mod: ,,, | Performed by: HOSPITALIST

## 2018-07-22 PROCEDURE — 25000003 PHARM REV CODE 250

## 2018-07-22 PROCEDURE — 85025 COMPLETE CBC W/AUTO DIFF WBC: CPT

## 2018-07-22 RX ADMIN — HYDROCODONE BITARTRATE AND ACETAMINOPHEN 1 TABLET: 5; 325 TABLET ORAL at 09:07

## 2018-07-22 RX ADMIN — SODIUM BICARBONATE 650 MG TABLET 1300 MG: at 09:07

## 2018-07-22 RX ADMIN — HYDROCODONE BITARTRATE AND ACETAMINOPHEN 1 TABLET: 5; 325 TABLET ORAL at 02:07

## 2018-07-22 RX ADMIN — PREDNISONE 60 MG: 20 TABLET ORAL at 08:07

## 2018-07-22 RX ADMIN — PANTOPRAZOLE SODIUM 40 MG: 40 TABLET, DELAYED RELEASE ORAL at 09:07

## 2018-07-22 RX ADMIN — MEROPENEM 1 G: 1 INJECTION, POWDER, FOR SOLUTION INTRAVENOUS at 05:07

## 2018-07-22 RX ADMIN — HYDROCORTISONE 2.5%: 25 CREAM TOPICAL at 09:07

## 2018-07-22 RX ADMIN — MYCOPHENOLATE MOFETIL 500 MG: 250 CAPSULE ORAL at 12:07

## 2018-07-22 RX ADMIN — SODIUM BICARBONATE 650 MG TABLET 1300 MG: at 08:07

## 2018-07-22 RX ADMIN — MYCOPHENOLATE MOFETIL 500 MG: 250 CAPSULE ORAL at 09:07

## 2018-07-22 RX ADMIN — HYDROCODONE BITARTRATE AND ACETAMINOPHEN 1 TABLET: 5; 325 TABLET ORAL at 08:07

## 2018-07-22 RX ADMIN — MEROPENEM 1 G: 1 INJECTION, POWDER, FOR SOLUTION INTRAVENOUS at 02:07

## 2018-07-22 RX ADMIN — FOLIC ACID 1 MG: 1 TABLET ORAL at 08:07

## 2018-07-22 NOTE — ASSESSMENT & PLAN NOTE
Seen by heme in the past.  By history (no melena or BRBPR) this is either a hemolytic anemia or a very slow GIB.  Haptoglobin <10, ldh elevated, edmundo +,  and retic high at 3.9 all concerning for hemolysis.  Received two units of PRBC.  - needs c-scope as outpt for screening too due to her age  - hem consulted for hemolysis  - hgb slowly dropping, transfuse to keep >7

## 2018-07-22 NOTE — SUBJECTIVE & OBJECTIVE
Interval History:  Symptoms:   Same weakness.  Diet:  Adequate intake.    Activity level:  Impaired due to weakness.   Pain:  No pain.       Review of Systems   Constitutional: Positive for fatigue. Negative for fever.   HENT: Negative for congestion.    Eyes: Negative for discharge.   Respiratory: Negative for shortness of breath.    Cardiovascular: Negative for chest pain.   Gastrointestinal: Negative for abdominal pain.   Genitourinary: Negative for difficulty urinating.   Musculoskeletal: Negative for arthralgias.   Skin:        Hair loss     Objective:     Vital Signs (Most Recent):  Temp: 97.5 °F (36.4 °C) (07/22/18 0725)  Pulse: (!) 53 (07/22/18 0725)  Resp: 17 (07/22/18 0725)  BP: (!) 167/77 (07/22/18 0725)  SpO2: 97 % (07/22/18 0725) Vital Signs (24h Range):  Temp:  [97.5 °F (36.4 °C)-97.8 °F (36.6 °C)] 97.5 °F (36.4 °C)  Pulse:  [49-68] 53  Resp:  [16-20] 17  SpO2:  [97 %-100 %] 97 %  BP: (151-167)/(75-81) 167/77     Weight: 78.7 kg (173 lb 9.6 oz)  Body mass index is 28.89 kg/m².    Physical Exam   Constitutional: She is oriented to person, place, and time. No distress.   Cardiovascular: Normal rate, regular rhythm and normal heart sounds.    Pulmonary/Chest: Effort normal and breath sounds normal.   Abdominal: Soft. Bowel sounds are normal.   Musculoskeletal: She exhibits no edema.   Neurological: She is alert and oriented to person, place, and time.   Psychiatric: She has a normal mood and affect. Her behavior is normal.           Significant Labs: All pertinent labs within the past 24 hours have been reviewed.    Significant Imaging: I have reviewed and interpreted all pertinent imaging results/findings within the past 24 hours.

## 2018-07-22 NOTE — ASSESSMENT & PLAN NOTE
Initial BMP showed Cr of 5.5, BUN 60.  She is followed by [facility] and has a baseline Cr of 3.  UA showed 2+ proteinuria, +RBC, +WBC, +Nitrites, +bacteria.  P/Cr ratio is 0.61, Stella 39, Upr 63.  Calculated FeNa shows 2.4% which signals an intrinsic etiology more likely than pre-renal.  This could be due to lupus flare, infection, or maybe due to hemolytic anemia.  Complement levels were normal, but ferritin, ESR, and CRP were elevated.  Hemolytic labs showed low haptoglobin and a normocytic anemia.  Obstructive etiology is unlikely due to renal U/S showing no evidence of stone, and no hydronephrosis.  Seems like there also might be some pre-renal compenent as Cr improved after most recent set of labs from 5.5 to 4.7, possibly due to 1u PRBC and fluids administered.  This is likely caused by the patient not being on cellcept for 2 weeks prior to admission, as she has been restarted on her medications, her kidney function, and overall function has improved.      1. Maintain hemodynamic stability; maintain MAP >65  2. Strict I/O, Hgb >7 gm/dL, daily weight and chart  3. Continue cellcept 500mg qd and prednisone  4. U/S showed no evidence of hydronephrosis or issues with RBF  5. qD BMP to assess renal function

## 2018-07-22 NOTE — SUBJECTIVE & OBJECTIVE
Interval History: RENETTA, patient states she is feeling better but not back to 100%.  Cr downtrending, making adequate urine    Review of patient's allergies indicates:   Allergen Reactions    Ciprofloxacin Hives    Avelox [moxifloxacin] Rash    Iodinated contrast- oral and iv dye Itching and Swelling    Reglan [metoclopramide hcl] Other (See Comments)     Nervous and paranoid    Amoxicillin Edema     Face and lips became swollen with rash.     Current Facility-Administered Medications   Medication Frequency    0.9%  NaCl infusion (for blood administration) Q24H PRN    acetaminophen tablet 650 mg Q4H PRN    folic acid tablet 1 mg Daily    HYDROcodone-acetaminophen 5-325 mg per tablet 1 tablet Q4H PRN    hydrocortisone 2.5 % rectal cream BID    meropenem 1 g in sodium chloride 0.9 % 100 mL IVPB (ready to mix system) Q12H    mycophenolate capsule 500 mg QHS    ondansetron injection 4 mg Q8H PRN    oxyCODONE immediate release tablet 15 mg Q4H PRN    pantoprazole EC tablet 40 mg Daily    predniSONE tablet 60 mg Daily    sodium bicarbonate tablet 1,300 mg BID    sodium chloride 0.9% flush 3 mL Q6H PRN       Objective:     Vital Signs (Most Recent):  Temp: 97.5 °F (36.4 °C) (07/22/18 0725)  Pulse: (!) 53 (07/22/18 0725)  Resp: 17 (07/22/18 0725)  BP: (!) 167/77 (07/22/18 0725)  SpO2: 97 % (07/22/18 0725)  O2 Device (Oxygen Therapy): room air (07/22/18 0725) Vital Signs (24h Range):  Temp:  [97.5 °F (36.4 °C)-97.8 °F (36.6 °C)] 97.5 °F (36.4 °C)  Pulse:  [49-68] 53  Resp:  [16-20] 17  SpO2:  [97 %-100 %] 97 %  BP: (151-167)/(75-81) 167/77     Weight: 78.7 kg (173 lb 9.6 oz) (07/19/18 1230)  Body mass index is 28.89 kg/m².  Body surface area is 1.9 meters squared.    I/O last 3 completed shifts:  In: 1340 [P.O.:1240; IV Piggyback:100]  Out: 550 [Urine:550]    Physical Exam   Constitutional: She is oriented to person, place, and time. No distress.   HENT:   Head: Normocephalic and atraumatic.   Right Ear:  External ear normal.   Left Ear: External ear normal.   Eyes: Conjunctivae are normal. Left eye exhibits no discharge. No scleral icterus.   Neck: Normal range of motion. Neck supple. No JVD present.   Cardiovascular: Normal rate, regular rhythm and normal heart sounds.    Pulmonary/Chest: Effort normal and breath sounds normal.   Abdominal: Soft. Bowel sounds are normal. She exhibits no distension. There is no tenderness.   Musculoskeletal: Normal range of motion. She exhibits no edema.   Neurological: She is alert and oriented to person, place, and time.   Skin: Skin is warm and dry.   Psychiatric: She has a normal mood and affect. Her behavior is normal.       Significant Labs:  CBC:   Recent Labs  Lab 07/22/18  0607   WBC 11.81   RBC 2.40*   HGB 7.6*   HCT 22.0*      MCV 92   MCH 31.7*   MCHC 34.5     CMP:   Recent Labs  Lab 07/19/18  0908  07/21/18  0513     < > 127*   CALCIUM 9.1  < > 8.4*   ALBUMIN 4.0  < > 3.4*   PROT 7.9  --   --      < > 141   K 4.5  < > 5.2*   CO2 15*  < > 18*   *  < > 113*   BUN 60*  < > 62*   CREATININE 5.5*  < > 4.6*   ALKPHOS 56  --   --    ALT 10  --   --    AST 22  --   --    BILITOT 0.9  --   --    < > = values in this interval not displayed.

## 2018-07-22 NOTE — PROGRESS NOTES
[facility]  Nephrology  Progress Note    Patient Name: Talya Bates  MRN: [id number]  Admission Date: 7/19/2018  Hospital Length of Stay: 3 days  Attending Provider: Russell Guadarrama MD   Primary Care Physician: Lee Mcnair MD  Principal Problem:Lupus (systemic lupus erythematosus)    Subjective:     HPI: Ms. Bates is a 54 y/o W with a PMHx of SLE, DVT, hemolytic anemia, class IV lupus nephritis that presented to the ED with complaints of SOB, mild abdominal pain, diarrhea, and decreased appetite.  Describes the SOB at both rest and exertion.  She denies a sedentary lifestyle, HRT, or recent long travel. She also has had hemorrhoid pain, and some lightheadedness/dizziness recently.  No issues with urinary urgency, incontinence, or dysuria.  She denies any other urinary complaint.    Her last received a blood transfusion 6 years ago, shortly after being diagnosed with a DVT. She denies blood in her stool, melena, fevers, chills, chest pain, headache, or any other medical complaints.      She ran our of her cellcept two weeks ago and is waiting on her MD to renew it.  She has had some hair loss in that time, but no joint pain or rash.  Her flares usually present with weakness such as this.    Interval History: NAEON, patient states she is feeling better but not back to 100%.  Cr downtrending, making adequate urine    Review of patient's allergies indicates:   Allergen Reactions    Ciprofloxacin Hives    Avelox [moxifloxacin] Rash    Iodinated contrast- oral and iv dye Itching and Swelling    Reglan [metoclopramide hcl] Other (See Comments)     Nervous and paranoid    Amoxicillin Edema     Face and lips became swollen with rash.     Current Facility-Administered Medications   Medication Frequency    0.9%  NaCl infusion (for blood administration) Q24H PRN    acetaminophen tablet 650 mg Q4H PRN    folic acid tablet 1 mg Daily    HYDROcodone-acetaminophen 5-325 mg per tablet 1  tablet Q4H PRN    hydrocortisone 2.5 % rectal cream BID    meropenem 1 g in sodium chloride 0.9 % 100 mL IVPB (ready to mix system) Q12H    mycophenolate capsule 500 mg QHS    ondansetron injection 4 mg Q8H PRN    oxyCODONE immediate release tablet 15 mg Q4H PRN    pantoprazole EC tablet 40 mg Daily    predniSONE tablet 60 mg Daily    sodium bicarbonate tablet 1,300 mg BID    sodium chloride 0.9% flush 3 mL Q6H PRN       Objective:     Vital Signs (Most Recent):  Temp: 97.5 °F (36.4 °C) (07/22/18 0725)  Pulse: (!) 53 (07/22/18 0725)  Resp: 17 (07/22/18 0725)  BP: (!) 167/77 (07/22/18 0725)  SpO2: 97 % (07/22/18 0725)  O2 Device (Oxygen Therapy): room air (07/22/18 0725) Vital Signs (24h Range):  Temp:  [97.5 °F (36.4 °C)-97.8 °F (36.6 °C)] 97.5 °F (36.4 °C)  Pulse:  [49-68] 53  Resp:  [16-20] 17  SpO2:  [97 %-100 %] 97 %  BP: (151-167)/(75-81) 167/77     Weight: 78.7 kg (173 lb 9.6 oz) (07/19/18 1230)  Body mass index is 28.89 kg/m².  Body surface area is 1.9 meters squared.    I/O last 3 completed shifts:  In: 1340 [P.O.:1240; IV Piggyback:100]  Out: 550 [Urine:550]    Physical Exam   Constitutional: She is oriented to person, place, and time. No distress.   HENT:   Head: Normocephalic and atraumatic.   Right Ear: External ear normal.   Left Ear: External ear normal.   Eyes: Conjunctivae are normal. Left eye exhibits no discharge. No scleral icterus.   Neck: Normal range of motion. Neck supple. No JVD present.   Cardiovascular: Normal rate, regular rhythm and normal heart sounds.    Pulmonary/Chest: Effort normal and breath sounds normal.   Abdominal: Soft. Bowel sounds are normal. She exhibits no distension. There is no tenderness.   Musculoskeletal: Normal range of motion. She exhibits no edema.   Neurological: She is alert and oriented to person, place, and time.   Skin: Skin is warm and dry.   Psychiatric: She has a normal mood and affect. Her behavior is normal.       Significant Labs:  CBC:   Recent  Labs  Lab 07/22/18  0607   WBC 11.81   RBC 2.40*   HGB 7.6*   HCT 22.0*      MCV 92   MCH 31.7*   MCHC 34.5     CMP:   Recent Labs  Lab 07/19/18  0908  07/21/18  0513     < > 127*   CALCIUM 9.1  < > 8.4*   ALBUMIN 4.0  < > 3.4*   PROT 7.9  --   --      < > 141   K 4.5  < > 5.2*   CO2 15*  < > 18*   *  < > 113*   BUN 60*  < > 62*   CREATININE 5.5*  < > 4.6*   ALKPHOS 56  --   --    ALT 10  --   --    AST 22  --   --    BILITOT 0.9  --   --    < > = values in this interval not displayed.         Assessment/Plan:     MARKEL (acute kidney injury)    Initial BMP showed Cr of 5.5, BUN 60.  She is followed by [facility] and has a baseline Cr of 3.  UA showed 2+ proteinuria, +RBC, +WBC, +Nitrites, +bacteria.  P/Cr ratio is 0.61, Stella 39, Upr 63.  Calculated FeNa shows 2.4% which signals an intrinsic etiology more likely than pre-renal.  This could be due to lupus flare, infection, or maybe due to hemolytic anemia.  Complement levels were normal, but ferritin, ESR, and CRP were elevated.  Hemolytic labs showed low haptoglobin and a normocytic anemia.  Obstructive etiology is unlikely due to renal U/S showing no evidence of stone, and no hydronephrosis.  Seems like there also might be some pre-renal compenent as Cr improved after most recent set of labs from 5.5 to 4.7, possibly due to 1u PRBC and fluids administered.  This is likely caused by the patient not being on cellcept for 2 weeks prior to admission, as she has been restarted on her medications, her kidney function, and overall function has improved.      1. Maintain hemodynamic stability; maintain MAP >65  2. Strict I/O, Hgb >7 gm/dL, daily weight and chart  3. Continue cellcept 500mg qd and prednisone  4. U/S showed no evidence of hydronephrosis or issues with RBF  5. qD BMP to assess renal function              Thank you for your consult. I will follow-up with patient. Please contact us if you have any additional questions.    Christopher SANDOVAL  MD [place]  Nephrology  [facility]

## 2018-07-22 NOTE — PLAN OF CARE
Problem: Patient Care Overview  Goal: Plan of Care Review  Outcome: Ongoing (interventions implemented as appropriate)   07/20/18 3493   Coping/Psychosocial   Plan Of Care Reviewed With patient;spouse       Problem: Pressure Ulcer Risk (Claudio Scale) (Adult,Obstetrics,Pediatric)  Goal: Identify Related Risk Factors and Signs and Symptoms  Related risk factors and signs and symptoms are identified upon initiation of Human Response Clinical Practice Guideline (CPG)   Outcome: Ongoing (interventions implemented as appropriate)   07/21/18 0027   Pressure Ulcer Risk (Claudio Scale)   Related Risk Factors (Pressure Ulcer Risk (Claudio Scale)) medication;mobility impaired       Problem: Infection, Risk/Actual (Adult)  Goal: Identify Related Risk Factors and Signs and Symptoms  Related risk factors and signs and symptoms are identified upon initiation of Human Response Clinical Practice Guideline (CPG)   Outcome: Ongoing (interventions implemented as appropriate)   07/21/18 0027   Infection, Risk/Actual   Related Risk Factors (Infection, Risk/Actual) chronic illness/condition;medication effects   Signs and Symptoms (Infection, Risk/Actual) pain       Problem: Constipation (Adult)  Goal: Identify Related Risk Factors and Signs and Symptoms  Related risk factors and signs and symptoms are identified upon initiation of Human Response Clinical Practice Guideline (CPG)   Outcome: Ongoing (interventions implemented as appropriate)   07/21/18 0027   Constipation   Related Risk Factors (Constipation) medication effects;painful defecation   Signs and Symptoms (Constipation) rectal pain/pressure;straining       Problem: Pain, Acute (Adult)  Goal: Identify Related Risk Factors and Signs and Symptoms  Related risk factors and signs and symptoms are identified upon initiation of Human Response Clinical Practice Guideline (CPG)   Outcome: Ongoing (interventions implemented as appropriate)   07/21/18 0027   Pain, Acute   Related Risk  Factors (Acute Pain) persistent pain   Signs and Symptoms (Acute Pain) constipation/diarrhea;sleep pattern alteration;moaning       Problem: Anemia (Adult)  Goal: Identify Related Risk Factors and Signs and Symptoms  Related risk factors and signs and symptoms are identified upon initiation of Human Response Clinical Practice Guideline (CPG)   Outcome: Ongoing (interventions implemented as appropriate)   07/21/18 0027   Anemia   Related Risk Factors (Anemia) chronic illness   Signs and Symptoms (Anemia) fatigue       Problem: Fall Risk (Adult)  Goal: Identify Related Risk Factors and Signs and Symptoms  Related risk factors and signs and symptoms are identified upon initiation of Human Response Clinical Practice Guideline (CPG)   Outcome: Ongoing (interventions implemented as appropriate)   07/21/18 2228   Fall Risk   Related Risk Factors (Fall Risk) sleep pattern alteration;environment unfamiliar

## 2018-07-22 NOTE — NURSING
Contacted IMD 2x, once around 2200 and again around 0200. Pt has been ned running HR of mid 40's. Pt is asymptomatic and blood pressure is stable. PA said not worried if vitals stable and no symptoms and continue to monitor. Will notify with any changes. Continuing to monitor. Safety maintained.

## 2018-07-22 NOTE — PROGRESS NOTES
[facility] Hospital Medicine  Progress Note    Patient Name: Talya Bates  MRN: [id number]  Patient Class: IP- Inpatient   Admission Date: 7/19/2018  Length of Stay: 3 days  Attending Physician: Russell Guadarrama MD  Primary Care Provider: Lee Mcnair MD    [facility] Medicine Team: [facility]OSP MED D Russell Guadarrama MD    Subjective:     Principal Problem:Lupus (systemic lupus erythematosus)    HPI:  This is a 53-year-old female with a PMHx of Lupus, DVT (2012, not on anticoagulants), and anemia who presents to the ED with SOB. This morning at 4:30 AM, she woke with SOB, fatigue, generalized weakness, and lightheadedness, which has been persistent since. She reports SOB at rest and with exertion.     She denies a sedentary lifestyle, HRT, or recent long travel. She also has had hemorrhoid pain, mild upper abdominal pain, diarrhea, and decreased appetite for the past 2 weeks. Her last received a blood transfusion 6 years ago, shortly after being diagnosed with a DVT. She denies blood in her stool, melena, fevers, chills, chest pain, headache, dizziness, or any other medical complaints.     She ran our of her cellcept two weeks ago and is waiting on her MD to renew it.  She has had some hair loss in that time, but no joint pain or rash.  Her flares usually present with weakness such as this.    Hospital Course:  See problem list    Interval History:  Symptoms:   Same weakness.  Diet:  Adequate intake.    Activity level:  Impaired due to weakness.   Pain:  No pain.       Review of Systems   Constitutional: Positive for fatigue. Negative for fever.   HENT: Negative for congestion.    Eyes: Negative for discharge.   Respiratory: Negative for shortness of breath.    Cardiovascular: Negative for chest pain.   Gastrointestinal: Negative for abdominal pain.   Genitourinary: Negative for difficulty urinating.   Musculoskeletal: Negative for arthralgias.   Skin:        Hair loss     Objective:      Vital Signs (Most Recent):  Temp: 97.5 °F (36.4 °C) (07/22/18 0725)  Pulse: (!) 53 (07/22/18 0725)  Resp: 17 (07/22/18 0725)  BP: (!) 167/77 (07/22/18 0725)  SpO2: 97 % (07/22/18 0725) Vital Signs (24h Range):  Temp:  [97.5 °F (36.4 °C)-97.8 °F (36.6 °C)] 97.5 °F (36.4 °C)  Pulse:  [49-68] 53  Resp:  [16-20] 17  SpO2:  [97 %-100 %] 97 %  BP: (151-167)/(75-81) 167/77     Weight: 78.7 kg (173 lb 9.6 oz)  Body mass index is 28.89 kg/m².    Physical Exam   Constitutional: She is oriented to person, place, and time. No distress.   Cardiovascular: Normal rate, regular rhythm and normal heart sounds.    Pulmonary/Chest: Effort normal and breath sounds normal.   Abdominal: Soft. Bowel sounds are normal.   Musculoskeletal: She exhibits no edema.   Neurological: She is alert and oriented to person, place, and time.   Psychiatric: She has a normal mood and affect. Her behavior is normal.           Significant Labs: All pertinent labs within the past 24 hours have been reviewed.    Significant Imaging: I have reviewed and interpreted all pertinent imaging results/findings within the past 24 hours.    Assessment/Plan:      * Lupus (systemic lupus erythematosus)    Pt with hair loss and weakness typical of her lupus flares while off her medications  -- rheum consult apprecaited  - resume her home cellcept  - started on prednisone 60 qd          Hyperkalemia    K 5.5 on 7/20  - kayexalate and telemetry          Acute cystitis without hematuria    UA is +N with >100 WBC  -- talked to ID pharmacist who recommends meropenem due to MARKEL and drug allergies          Bleeding hemorrhoid    Pt denies seeing any blood at home so I do not think this is the cause of her anemia.    Still painful  -- topical steroids for hemorrhoidal pain        Lupus nephritis    CKD (chronic kidney disease), stage IV with MARKEL  Baseline Cr of 2.5 now up to 5.5.  P:CR elevated at 0.61.    U/S showed no evidence of hydronephrosis or issues with RBF  - renal  consult given lupus, low bicarb,and high BUN  - started on bicarb for metabolic acidosis          Hemolytic anemia    Seen by heme in the past.  By history (no melena or BRBPR) this is either a hemolytic anemia or a very slow GIB.  Haptoglobin <10, ldh elevated, edmundo +,  and retic high at 3.9 all concerning for hemolysis.  Received two units of PRBC.  - needs c-scope as outpt for screening too due to her age  - hem consulted for hemolysis  - hgb slowly dropping, transfuse to keep >7            VTE Risk Mitigation         Ordered     IP VTE HIGH RISK PATIENT  Once      07/19/18 1250     Place AZUL hose  Until discontinued      07/19/18 1250     Place AZUL hose  Until discontinued      07/19/18 1138     Place sequential compression device  Until discontinued      07/19/18 1138     Reason for No Pharmacological VTE Prophylaxis  Once      07/19/18 1138              Russell Guadarrama MD  Department of Hospital Medicine   [facility]

## 2018-07-22 NOTE — ASSESSMENT & PLAN NOTE
CKD (chronic kidney disease), stage IV with MARKEL  Baseline Cr of 2.5 now up to 5.5.  P:CR elevated at 0.61.    U/S showed no evidence of hydronephrosis or issues with RBF  - renal consult given lupus, low bicarb,and high BUN  - started on bicarb for metabolic acidosis

## 2018-07-23 PROBLEM — R03.0 ELEVATED BLOOD PRESSURE READING: Status: ACTIVE | Noted: 2018-07-23

## 2018-07-23 LAB
ABO GROUP BLD: NORMAL
ALBUMIN SERPL BCP-MCNC: 3.5 G/DL
ANION GAP SERPL CALC-SCNC: 9 MMOL/L
BASOPHILS # BLD AUTO: 0.01 K/UL
BASOPHILS NFR BLD: 0.1 %
BLD GP AB SCN CELLS X3 SERPL QL: NORMAL
BUN SERPL-MCNC: 69 MG/DL
CALCIUM SERPL-MCNC: 8.3 MG/DL
CHLORIDE SERPL-SCNC: 111 MMOL/L
CO2 SERPL-SCNC: 23 MMOL/L
CREAT SERPL-MCNC: 4 MG/DL
DIFFERENTIAL METHOD: ABNORMAL
EOSINOPHIL # BLD AUTO: 0 K/UL
EOSINOPHIL NFR BLD: 0 %
ERYTHROCYTE [DISTWIDTH] IN BLOOD BY AUTOMATED COUNT: 17.8 %
EST. GFR  (AFRICAN AMERICAN): 13.7 ML/MIN/1.73 M^2
EST. GFR  (NON AFRICAN AMERICAN): 11.9 ML/MIN/1.73 M^2
GLUCOSE SERPL-MCNC: 163 MG/DL
HCT VFR BLD AUTO: 27.1 %
HGB BLD-MCNC: 9.2 G/DL
IMM GRANULOCYTES # BLD AUTO: 0.2 K/UL
IMM GRANULOCYTES NFR BLD AUTO: 1.5 %
LYMPHOCYTES # BLD AUTO: 2.4 K/UL
LYMPHOCYTES NFR BLD: 17.9 %
MCH RBC QN AUTO: 31.4 PG
MCHC RBC AUTO-ENTMCNC: 33.9 G/DL
MCV RBC AUTO: 93 FL
MONOCYTES # BLD AUTO: 0.7 K/UL
MONOCYTES NFR BLD: 5.6 %
NEUTROPHILS # BLD AUTO: 10 K/UL
NEUTROPHILS NFR BLD: 74.9 %
NRBC BLD-RTO: 0 /100 WBC
PHOSPHATE SERPL-MCNC: 3.9 MG/DL
PLATELET # BLD AUTO: 326 K/UL
PMV BLD AUTO: 10 FL
POTASSIUM SERPL-SCNC: 4 MMOL/L
RBC # BLD AUTO: 2.93 M/UL
RH BLD: NORMAL
SODIUM SERPL-SCNC: 143 MMOL/L
WBC # BLD AUTO: 13.29 K/UL

## 2018-07-23 PROCEDURE — 86901 BLOOD TYPING SEROLOGIC RH(D): CPT

## 2018-07-23 PROCEDURE — 36415 COLL VENOUS BLD VENIPUNCTURE: CPT

## 2018-07-23 PROCEDURE — 99232 SBSQ HOSP IP/OBS MODERATE 35: CPT | Mod: ,,, | Performed by: HOSPITALIST

## 2018-07-23 PROCEDURE — 63600175 PHARM REV CODE 636 W HCPCS: Performed by: HOSPITALIST

## 2018-07-23 PROCEDURE — 25000003 PHARM REV CODE 250

## 2018-07-23 PROCEDURE — 86850 RBC ANTIBODY SCREEN: CPT

## 2018-07-23 PROCEDURE — 99231 SBSQ HOSP IP/OBS SF/LOW 25: CPT | Mod: ,,, | Performed by: INTERNAL MEDICINE

## 2018-07-23 PROCEDURE — 86900 BLOOD TYPING SEROLOGIC ABO: CPT

## 2018-07-23 PROCEDURE — 11000001 HC ACUTE MED/SURG PRIVATE ROOM

## 2018-07-23 PROCEDURE — 85025 COMPLETE CBC W/AUTO DIFF WBC: CPT

## 2018-07-23 PROCEDURE — 25000003 PHARM REV CODE 250: Performed by: HOSPITALIST

## 2018-07-23 PROCEDURE — 80069 RENAL FUNCTION PANEL: CPT

## 2018-07-23 RX ORDER — CALCIUM CARBONATE 200(500)MG
1000 TABLET,CHEWABLE ORAL 3 TIMES DAILY PRN
Status: DISCONTINUED | OUTPATIENT
Start: 2018-07-23 | End: 2018-07-24 | Stop reason: HOSPADM

## 2018-07-23 RX ADMIN — SODIUM BICARBONATE 650 MG TABLET 1300 MG: at 08:07

## 2018-07-23 RX ADMIN — HYDROCORTISONE 2.5%: 25 CREAM TOPICAL at 09:07

## 2018-07-23 RX ADMIN — CALCIUM CARBONATE (ANTACID) CHEW TAB 500 MG 1000 MG: 500 CHEW TAB at 05:07

## 2018-07-23 RX ADMIN — HYDROCODONE BITARTRATE AND ACETAMINOPHEN 1 TABLET: 5; 325 TABLET ORAL at 05:07

## 2018-07-23 RX ADMIN — HYDROCORTISONE 2.5%: 25 CREAM TOPICAL at 08:07

## 2018-07-23 RX ADMIN — FOLIC ACID 1 MG: 1 TABLET ORAL at 09:07

## 2018-07-23 RX ADMIN — PANTOPRAZOLE SODIUM 40 MG: 40 TABLET, DELAYED RELEASE ORAL at 09:07

## 2018-07-23 RX ADMIN — PREDNISONE 60 MG: 20 TABLET ORAL at 09:07

## 2018-07-23 RX ADMIN — SODIUM BICARBONATE 650 MG TABLET 1300 MG: at 09:07

## 2018-07-23 RX ADMIN — CALCIUM CARBONATE (ANTACID) CHEW TAB 500 MG 1000 MG: 500 CHEW TAB at 01:07

## 2018-07-23 RX ADMIN — MYCOPHENOLATE MOFETIL 500 MG: 250 CAPSULE ORAL at 08:07

## 2018-07-23 RX ADMIN — HYDROCODONE BITARTRATE AND ACETAMINOPHEN 1 TABLET: 5; 325 TABLET ORAL at 09:07

## 2018-07-23 RX ADMIN — MEROPENEM 1 G: 1 INJECTION, POWDER, FOR SOLUTION INTRAVENOUS at 05:07

## 2018-07-23 RX ADMIN — OXYCODONE HYDROCHLORIDE 15 MG: 5 TABLET ORAL at 02:07

## 2018-07-23 RX ADMIN — MEROPENEM 1 G: 1 INJECTION, POWDER, FOR SOLUTION INTRAVENOUS at 02:07

## 2018-07-23 NOTE — PROGRESS NOTES
[facility]  Rheumatology  Progress Note    Patient Name: Talya Bates  MRN: [id number]  Admission Date: 7/19/2018  Hospital Length of Stay: 4 days  Code Status: Full Code   Attending Provider: Russell Guadarrama MD  Primary Care Physician: Lee Mcnair MD  Principal Problem: Lupus (systemic lupus erythematosus)    Subjective:     HPI: Ms. Bates is a 53 year old female with medical history of SLE (malar rash, low wct count, arthritis, photosensitivity, hair loss, dry eyes/mouth, raynauds, +ANA1:1280 +SSA, +RNP), lupus nephritis class 4 (follows with Yas), history of hemolytic anemia (treated with prednisone) who presented to the ED on 7/19 for shortness of breath, fatigue, generalized weakness. She was found to have hgb of 6.2 (9 in Jan 2018), with no signs of bleeding. 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 She states that she has been off her cellcept for 2 weeks. She has increase in hair loss and has some joint pain/swelling in her right hand. She denies fever, chills, weight changes, night sweats, eye pain/redness, dry eyes, dry mouth, oral ulcers, rash, photosensitivity, raynauds, distal hand ulcers,chest pain, constipation, diarrhea, bloody diarrhea.   Of note she last saw her Rheumatologist Dr. Dickerson in December 2017. At that time her cellcept was held as her wct had dropped. She re-started in Feb.   Her labs show normal complements. Her Cr in elevated to 5.5 from 3 in Jan. Her UPC is 0.61 from 1.0 in Jan. Renal u/s showing chronic medical renal disease.   She was restarted on her cellcept 500mg nightly, was given one dose of methylpred 125mg IV.     Interval History: No acute overnight events. Patient is doing well. Denies any bleeding, rash or joint pain.     Current Facility-Administered Medications   Medication Frequency    0.9%  NaCl infusion (for blood administration) Q24H PRN    acetaminophen tablet 650 mg Q4H PRN     calcium carbonate 200 mg calcium (500 mg) chewable tablet 1,000 mg TID PRN    folic acid tablet 1 mg Daily    HYDROcodone-acetaminophen 5-325 mg per tablet 1 tablet Q4H PRN    hydrocortisone 2.5 % rectal cream BID    meropenem 1 g in sodium chloride 0.9 % 100 mL IVPB (ready to mix system) Q12H    mycophenolate capsule 500 mg QHS    ondansetron injection 4 mg Q8H PRN    oxyCODONE immediate release tablet 15 mg Q4H PRN    pantoprazole EC tablet 40 mg Daily    predniSONE tablet 60 mg Daily    sodium bicarbonate tablet 1,300 mg BID    sodium chloride 0.9% flush 3 mL Q6H PRN     Objective:     Vital Signs (Most Recent):  Temp: 97.5 °F (36.4 °C) (07/23/18 1128)  Pulse: 60 (07/23/18 1509)  Resp: 17 (07/23/18 1128)  BP: (!) 182/84 (07/23/18 1128)  SpO2: 100 % (07/23/18 1128)  O2 Device (Oxygen Therapy): room air (07/23/18 0847) Vital Signs (24h Range):  Temp:  [97.1 °F (36.2 °C)-97.9 °F (36.6 °C)] 97.5 °F (36.4 °C)  Pulse:  [44-67] 60  Resp:  [16-17] 17  SpO2:  [97 %-100 %] 100 %  BP: (135-182)/(71-85) 182/84     Weight: 78.7 kg (173 lb 9.6 oz) (07/19/18 1230)  Body mass index is 28.89 kg/m².  Body surface area is 1.9 meters squared.      Intake/Output Summary (Last 24 hours) at 07/23/18 1629  Last data filed at 07/23/18 0600   Gross per 24 hour   Intake              980 ml   Output              700 ml   Net              280 ml       Physical Exam   Constitutional: She is oriented to person, place, and time and well-developed, well-nourished, and in no distress. No distress.   HENT:   Head: Normocephalic and atraumatic.   Mouth/Throat: Oropharynx is clear and moist. No oropharyngeal exudate.   Eyes: Conjunctivae and EOM are normal. Pupils are equal, round, and reactive to light. No scleral icterus.   Neck: Normal range of motion.   Cardiovascular: Normal rate and regular rhythm.    No murmur heard.  Pulmonary/Chest: Effort normal and breath sounds normal. No respiratory distress. She has no wheezes. She has  no rales.   Abdominal: Soft. Bowel sounds are normal. She exhibits no distension. There is no tenderness.       Right Side Rheumatological Exam     Examination finds the shoulder, elbow, wrist, knee, 1st PIP, 1st MCP, 2nd PIP, 2nd MCP, 3rd PIP, 3rd MCP, 4th PIP, 4th MCP, 5th PIP and 5th MCP normal.    Muscle Strength (0-5 scale):  Biceps: 5/5   Distal Lower Extremity: 5    Left Side Rheumatological Exam     Examination finds the shoulder, elbow, wrist, knee, 1st PIP, 1st MCP, 2nd PIP, 2nd MCP, 3rd PIP, 3rd MCP, 4th PIP, 4th MCP, 5th PIP and 5th MCP normal.    Muscle Strength (0-5 scale):  Biceps: 5/5   Distal Lower Extremity: 5      Neurological: She is alert and oriented to person, place, and time. No cranial nerve deficit.   Skin: Skin is warm and dry. No rash noted. She is not diaphoretic. No erythema.     Musculoskeletal: Normal range of motion. She exhibits no edema.         Significant Labs:  BMP:   Recent Labs  Lab 07/23/18  0821   *      K 4.0   *   CO2 23   BUN 69*   CREATININE 4.0*   CALCIUM 8.3*     CBC:   Recent Labs  Lab 07/23/18  0821   WBC 13.29*   HGB 9.2*   HCT 27.1*        CMP:   Recent Labs  Lab 07/23/18  0821   *   CALCIUM 8.3*   ALBUMIN 3.5      K 4.0   CO2 23   *   BUN 69*   CREATININE 4.0*       Significant Imaging:  Imaging results within the past 24 hours have been reviewed.    Assessment/Plan:     * Lupus (systemic lupus erythematosus)    Ms. Bates is a 53 year old female with medical history of SLE (malar rash, low wct count, arthritis, photosensitivity, hair loss, dry eyes/mouth, raynauds, +ANA1:1280 +SSA, +RNP), lupus nephritis class 4 (follows with Yas), history of hemolytic anemia (treated with prednisone) who presented to the ED on 7/19 for shortness of breath, fatigue, generalized weakness. She was found to have hgb of 6.2 (9 in Jan 2018), with no signs of bleeding. 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 HER SLEDAI is 8 (arthritis and proteinuria) although her proteinuria has improved. Her complements are normal, dsDNA negative. This is likely a mild flare in context of not taking cellcept for 2 weeks.     Treatment this far:  Solumedrol x1 125mg on 7/19  Prednisone 60mg daily on 7/20 to current   cellcept 500mg daily on 7/19 to current     Plan:  -appreciate heme/oncs recommendations   -continue prednisone 60mg daily at this time.   -continue cellcept 500mg daily   -From our standpoint she is ok to discharge, she will need to continue prednisone 60mg daily and cellcept 500mg daily on discharge   -She will need follow up with Dr. Dickerson in 2-3 weeks  -She will also need to follow up with Yas and the study she is involved in   -Follow up with heme/onc as well.           Thank you for this consult. We will sign off.     Sue Hatfield MD  Rheumatology  [facility]

## 2018-07-23 NOTE — PROGRESS NOTES
[facility] Hospital Medicine  Progress Note    Patient Name: Talya Bates  MRN: [id number]  Patient Class: IP- Inpatient   Admission Date: 7/19/2018  Length of Stay: 4 days  Attending Physician: Russell Guadarrama MD  Primary Care Provider: Lee Mcnair MD    [facility] Medicine Team: [facility] D Russell Guadarrama MD    Subjective:     Principal Problem:Lupus (systemic lupus erythematosus)    HPI:  This is a 53-year-old female with a PMHx of Lupus, DVT (2012, not on anticoagulants), and anemia who presents to the ED with SOB. This morning at 4:30 AM, she woke with SOB, fatigue, generalized weakness, and lightheadedness, which has been persistent since. She reports SOB at rest and with exertion.     She denies a sedentary lifestyle, HRT, or recent long travel. She also has had hemorrhoid pain, mild upper abdominal pain, diarrhea, and decreased appetite for the past 2 weeks. Her last received a blood transfusion 6 years ago, shortly after being diagnosed with a DVT. She denies blood in her stool, melena, fevers, chills, chest pain, headache, dizziness, or any other medical complaints.     She ran our of her cellcept two weeks ago and is waiting on her MD to renew it.  She has had some hair loss in that time, but no joint pain or rash.  Her flares usually present with weakness such as this.    Hospital Course:  See problem list    Interval History:  Symptoms:   Same weakness.  Diet:  Adequate intake.    Activity level:  Impaired due to weakness.   Pain:  No pain.       Review of Systems   Constitutional: Positive for fatigue. Negative for fever.   HENT: Negative for congestion.    Eyes: Negative for discharge.   Respiratory: Negative for shortness of breath.    Cardiovascular: Negative for chest pain.   Gastrointestinal: Negative for abdominal pain.   Genitourinary: Negative for difficulty urinating.   Musculoskeletal: Negative for arthralgias.   Skin:        Hair loss     Objective:      Vital Signs (Most Recent):  Temp: 97.6 °F (36.4 °C) (07/23/18 0847)  Pulse: (!) 57 (07/23/18 0847)  Resp: 17 (07/23/18 0847)  BP: (!) 171/85 (07/23/18 0847)  SpO2: 100 % (07/23/18 0847) Vital Signs (24h Range):  Temp:  [97.1 °F (36.2 °C)-97.9 °F (36.6 °C)] 97.6 °F (36.4 °C)  Pulse:  [44-67] 57  Resp:  [16-18] 17  SpO2:  [97 %-100 %] 100 %  BP: (135-171)/(71-85) 171/85     Weight: 78.7 kg (173 lb 9.6 oz)  Body mass index is 28.89 kg/m².    Physical Exam   Constitutional: She is oriented to person, place, and time. No distress.   Cardiovascular: Normal rate, regular rhythm and normal heart sounds.    Pulmonary/Chest: Effort normal and breath sounds normal.   Abdominal: Soft. Bowel sounds are normal.   Musculoskeletal: She exhibits no edema.   Neurological: She is alert and oriented to person, place, and time.   Psychiatric: She has a normal mood and affect. Her behavior is normal.           Significant Labs: All pertinent labs within the past 24 hours have been reviewed.    Significant Imaging: I have reviewed and interpreted all pertinent imaging results/findings within the past 24 hours.    Assessment/Plan:      * Lupus (systemic lupus erythematosus)    Pt with hair loss and weakness typical of her lupus flares while off her medications  -- rheum consult apprecaited  - resume her home cellcept  - started on prednisone 60 qd          Elevated blood pressure reading    Per clinic chart review she is usually controlled without meds.  Recent elevated readings likely due to steroid use.  Will hold on BP meds unless renal wishes them for kidney protection.          Hyperkalemia    K 5.5 on 7/20  - kayexalate and telemetry          Acute cystitis without hematuria    UA is +N with >100 WBC,.  Ucx has e. Coli.  -- talked to ID pharmacist who recommends IV meropenem due to MARKEL and drug allergies.  Last dose Tuesday am.          Bleeding hemorrhoid    Pt denies seeing any blood at home so I do not think this is the cause  of her anemia.    Still painful  -- topical steroids for hemorrhoidal pain        Lupus nephritis    CKD (chronic kidney disease), stage IV with MARKEL  Baseline Cr of 2.5 now up to 5.5.  P:CR elevated at 0.61.    U/S showed no evidence of hydronephrosis or issues with RBF  - renal consult given lupus, low bicarb,and high BUN  - started on bicarb for metabolic acidosis          Hemolytic anemia    Seen by heme in the past.  By history (no melena or BRBPR) this is either a hemolytic anemia or a very slow GIB.  Haptoglobin <10, ldh elevated, edmundo +,  and retic high at 3.9 all concerning for hemolysis.  Received two units of PRBC.  - needs c-scope as outpt for screening too due to her age  - hem consulted for hemolysis  - transfuse to keep >7; might be able to DC tomorrow if it continues to improve            VTE Risk Mitigation         Ordered     IP VTE HIGH RISK PATIENT  Once      07/19/18 1250     Place AZUL hose  Until discontinued      07/19/18 1250     Place AZUL hose  Until discontinued      07/19/18 1138     Place sequential compression device  Until discontinued      07/19/18 1138     Reason for No Pharmacological VTE Prophylaxis  Once      07/19/18 1138              Russell Guadarrama MD  Department of Hospital Medicine   [facility]

## 2018-07-23 NOTE — SUBJECTIVE & OBJECTIVE
Interval History:  Symptoms:   Same weakness.  Diet:  Adequate intake.    Activity level:  Impaired due to weakness.   Pain:  No pain.       Review of Systems   Constitutional: Positive for fatigue. Negative for fever.   HENT: Negative for congestion.    Eyes: Negative for discharge.   Respiratory: Negative for shortness of breath.    Cardiovascular: Negative for chest pain.   Gastrointestinal: Negative for abdominal pain.   Genitourinary: Negative for difficulty urinating.   Musculoskeletal: Negative for arthralgias.   Skin:        Hair loss     Objective:     Vital Signs (Most Recent):  Temp: 97.6 °F (36.4 °C) (07/23/18 0847)  Pulse: (!) 57 (07/23/18 0847)  Resp: 17 (07/23/18 0847)  BP: (!) 171/85 (07/23/18 0847)  SpO2: 100 % (07/23/18 0847) Vital Signs (24h Range):  Temp:  [97.1 °F (36.2 °C)-97.9 °F (36.6 °C)] 97.6 °F (36.4 °C)  Pulse:  [44-67] 57  Resp:  [16-18] 17  SpO2:  [97 %-100 %] 100 %  BP: (135-171)/(71-85) 171/85     Weight: 78.7 kg (173 lb 9.6 oz)  Body mass index is 28.89 kg/m².    Physical Exam   Constitutional: She is oriented to person, place, and time. No distress.   Cardiovascular: Normal rate, regular rhythm and normal heart sounds.    Pulmonary/Chest: Effort normal and breath sounds normal.   Abdominal: Soft. Bowel sounds are normal.   Musculoskeletal: She exhibits no edema.   Neurological: She is alert and oriented to person, place, and time.   Psychiatric: She has a normal mood and affect. Her behavior is normal.           Significant Labs: All pertinent labs within the past 24 hours have been reviewed.    Significant Imaging: I have reviewed and interpreted all pertinent imaging results/findings within the past 24 hours.

## 2018-07-23 NOTE — ASSESSMENT & PLAN NOTE
Ms. Bates is a 53 year old female with medical history of SLE (malar rash, low wct count, arthritis, photosensitivity, hair loss, dry eyes/mouth, raynauds, +ANA1:1280 +SSA, +RNP), lupus nephritis class 4 (follows with Yas), history of hemolytic anemia (treated with prednisone) who presented to the ED on 7/19 for shortness of breath, fatigue, generalized weakness. She was found to have hgb of 6.2 (9 in Jan 2018), with no signs of bleeding. 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 HER SLEDAI is 8 (arthritis and proteinuria) although her proteinuria has improved. Her complements are normal, dsDNA negative. This is likely a mild flare in context of not taking cellcept for 2 weeks.     Treatment this far:  Solumedrol x1 125mg on 7/19  Prednisone 60mg daily on 7/20 to current   cellcept 500mg daily on 7/19 to current     Plan:  -appreciate heme/oncs recommendations   -continue prednisone 60mg daily at this time.   -continue cellcept 500mg daily   -From our standpoint she is ok to discharge, she will need to continue prednisone 60mg daily and cellcept 500mg daily on discharge   -She will need follow up with Dr. Dickerson in 2-3 weeks  -She will also need to follow up with Yas and the study she is involved in   -Follow up with heme/onc as well.

## 2018-07-23 NOTE — PROGRESS NOTES
[facility]  Nephrology  Progress Note    Patient Name: Talya Bates  MRN: [id number]  Admission Date: 7/19/2018  Hospital Length of Stay: 4 days  Attending Provider: Russell Guadarrama MD   Primary Care Physician: Lee Mcnair MD  Principal Problem:Lupus (systemic lupus erythematosus)    Subjective:     HPI: Ms. Bates is a 52 y/o W with a PMHx of SLE, DVT, hemolytic anemia, class IV lupus nephritis that presented to the ED with complaints of SOB, mild abdominal pain, diarrhea, and decreased appetite.  Describes the SOB at both rest and exertion.  She denies a sedentary lifestyle, HRT, or recent long travel. She also has had hemorrhoid pain, and some lightheadedness/dizziness recently.  No issues with urinary urgency, incontinence, or dysuria.  She denies any other urinary complaint.    Her last received a blood transfusion 6 years ago, shortly after being diagnosed with a DVT. She denies blood in her stool, melena, fevers, chills, chest pain, headache, or any other medical complaints.      She ran our of her cellcept two weeks ago and is waiting on her MD to renew it.  She has had some hair loss in that time, but no joint pain or rash.  Her flares usually present with weakness such as this.    Interval History: NAEON.  Patient is doing well, would like to leave the hospital soon.  No active bleeding noticed.  UOP 700cc with 4 unmeasured occurences    Review of patient's allergies indicates:   Allergen Reactions    Ciprofloxacin Hives    Avelox [moxifloxacin] Rash    Iodinated contrast- oral and iv dye Itching and Swelling    Reglan [metoclopramide hcl] Other (See Comments)     Nervous and paranoid    Amoxicillin Edema     Face and lips became swollen with rash.     Current Facility-Administered Medications   Medication Frequency    0.9%  NaCl infusion (for blood administration) Q24H PRN    acetaminophen tablet 650 mg Q4H PRN    folic acid tablet 1 mg Daily     HYDROcodone-acetaminophen 5-325 mg per tablet 1 tablet Q4H PRN    hydrocortisone 2.5 % rectal cream BID    meropenem 1 g in sodium chloride 0.9 % 100 mL IVPB (ready to mix system) Q12H    mycophenolate capsule 500 mg QHS    ondansetron injection 4 mg Q8H PRN    oxyCODONE immediate release tablet 15 mg Q4H PRN    pantoprazole EC tablet 40 mg Daily    predniSONE tablet 60 mg Daily    sodium bicarbonate tablet 1,300 mg BID    sodium chloride 0.9% flush 3 mL Q6H PRN       Objective:     Vital Signs (Most Recent):  Temp: 97.6 °F (36.4 °C) (07/23/18 0847)  Pulse: (!) 57 (07/23/18 0847)  Resp: 17 (07/23/18 0847)  BP: (!) 171/85 (07/23/18 0847)  SpO2: 100 % (07/23/18 0847)  O2 Device (Oxygen Therapy): room air (07/23/18 0847) Vital Signs (24h Range):  Temp:  [97.1 °F (36.2 °C)-97.9 °F (36.6 °C)] 97.6 °F (36.4 °C)  Pulse:  [44-67] 57  Resp:  [16-18] 17  SpO2:  [97 %-100 %] 100 %  BP: (135-171)/(71-85) 171/85     Weight: 78.7 kg (173 lb 9.6 oz) (07/19/18 1230)  Body mass index is 28.89 kg/m².  Body surface area is 1.9 meters squared.    I/O last 3 completed shifts:  In: 1260 [P.O.:1160; IV Piggyback:100]  Out: 1250 [Urine:1250]    Physical Exam   Constitutional: She is oriented to person, place, and time. No distress.   HENT:   Head: Normocephalic and atraumatic.   Right Ear: External ear normal.   Left Ear: External ear normal.   Eyes: Conjunctivae are normal. Left eye exhibits no discharge. No scleral icterus.   Neck: Normal range of motion. Neck supple. No JVD present.   Cardiovascular: Normal rate, regular rhythm and normal heart sounds.    Pulmonary/Chest: Effort normal and breath sounds normal.   Abdominal: Soft. Bowel sounds are normal. She exhibits no distension. There is no tenderness.   Musculoskeletal: Normal range of motion. She exhibits no edema.   Neurological: She is alert and oriented to person, place, and time.   Skin: Skin is warm and dry.   Psychiatric: She has a normal mood and affect. Her  behavior is normal.       Significant Labs:  CBC:   Recent Labs  Lab 07/22/18  0607   WBC 11.81   RBC 2.40*   HGB 7.6*   HCT 22.0*      MCV 92   MCH 31.7*   MCHC 34.5     CMP:   Recent Labs  Lab 07/19/18  0908  07/21/18  0513     < > 127*   CALCIUM 9.1  < > 8.4*   ALBUMIN 4.0  < > 3.4*   PROT 7.9  --   --      < > 141   K 4.5  < > 5.2*   CO2 15*  < > 18*   *  < > 113*   BUN 60*  < > 62*   CREATININE 5.5*  < > 4.6*   ALKPHOS 56  --   --    ALT 10  --   --    AST 22  --   --    BILITOT 0.9  --   --    < > = values in this interval not displayed.       Assessment/Plan:     MARKEL (acute kidney injury)    Initial BMP showed Cr of 5.5, BUN 60.  She is followed by [facility] and has a baseline Cr of 3.  UA showed 2+ proteinuria, +RBC, +WBC, +Nitrites, +bacteria.  P/Cr ratio is 0.61, Stella 39, Upr 63.  Calculated FeNa shows 2.4% which signals an intrinsic etiology more likely than pre-renal.  This could be due to lupus flare, infection, or maybe due to hemolytic anemia.  Complement levels were normal, but ferritin, ESR, and CRP were elevated.  Hemolytic labs showed low haptoglobin and a normocytic anemia.  Obstructive etiology is unlikely due to renal U/S showing no evidence of stone, and no hydronephrosis.  Seems like there also might be some pre-renal compenent as Cr improved after most recent set of labs from 5.5 to 4.7, possibly due to 1u PRBC and fluids administered.  This is likely caused by the patient not being on cellcept for 2 weeks prior to admission, as she has been restarted on her medications, her kidney function, and overall function has improved.      1. Maintain hemodynamic stability; maintain MAP >65  2. Strict I/O, Hgb >7 gm/dL, daily weight and chart  3. Continue cellcept 500mg qd and prednisone  4. U/S showed no evidence of hydronephrosis or issues with RBF  5. qD BMP to assess renal function              Thank you for your consult. I will follow-up with patient. Please  contact us if you have any additional questions.    Christopher De La Vega MD  Nephrology  [facility]

## 2018-07-23 NOTE — SUBJECTIVE & OBJECTIVE
Interval History: No acute overnight events. Patient is doing well. Denies any bleeding, rash or joint pain.     Current Facility-Administered Medications   Medication Frequency    0.9%  NaCl infusion (for blood administration) Q24H PRN    acetaminophen tablet 650 mg Q4H PRN    calcium carbonate 200 mg calcium (500 mg) chewable tablet 1,000 mg TID PRN    folic acid tablet 1 mg Daily    HYDROcodone-acetaminophen 5-325 mg per tablet 1 tablet Q4H PRN    hydrocortisone 2.5 % rectal cream BID    meropenem 1 g in sodium chloride 0.9 % 100 mL IVPB (ready to mix system) Q12H    mycophenolate capsule 500 mg QHS    ondansetron injection 4 mg Q8H PRN    oxyCODONE immediate release tablet 15 mg Q4H PRN    pantoprazole EC tablet 40 mg Daily    predniSONE tablet 60 mg Daily    sodium bicarbonate tablet 1,300 mg BID    sodium chloride 0.9% flush 3 mL Q6H PRN     Objective:     Vital Signs (Most Recent):  Temp: 97.5 °F (36.4 °C) (07/23/18 1128)  Pulse: 60 (07/23/18 1509)  Resp: 17 (07/23/18 1128)  BP: (!) 182/84 (07/23/18 1128)  SpO2: 100 % (07/23/18 1128)  O2 Device (Oxygen Therapy): room air (07/23/18 0847) Vital Signs (24h Range):  Temp:  [97.1 °F (36.2 °C)-97.9 °F (36.6 °C)] 97.5 °F (36.4 °C)  Pulse:  [44-67] 60  Resp:  [16-17] 17  SpO2:  [97 %-100 %] 100 %  BP: (135-182)/(71-85) 182/84     Weight: 78.7 kg (173 lb 9.6 oz) (07/19/18 1230)  Body mass index is 28.89 kg/m².  Body surface area is 1.9 meters squared.      Intake/Output Summary (Last 24 hours) at 07/23/18 1629  Last data filed at 07/23/18 0600   Gross per 24 hour   Intake              980 ml   Output              700 ml   Net              280 ml       Physical Exam   Constitutional: She is oriented to person, place, and time and well-developed, well-nourished, and in no distress. No distress.   HENT:   Head: Normocephalic and atraumatic.   Mouth/Throat: Oropharynx is clear and moist. No oropharyngeal exudate.   Eyes: Conjunctivae and EOM are normal.  Pupils are equal, round, and reactive to light. No scleral icterus.   Neck: Normal range of motion.   Cardiovascular: Normal rate and regular rhythm.    No murmur heard.  Pulmonary/Chest: Effort normal and breath sounds normal. No respiratory distress. She has no wheezes. She has no rales.   Abdominal: Soft. Bowel sounds are normal. She exhibits no distension. There is no tenderness.       Right Side Rheumatological Exam     Examination finds the shoulder, elbow, wrist, knee, 1st PIP, 1st MCP, 2nd PIP, 2nd MCP, 3rd PIP, 3rd MCP, 4th PIP, 4th MCP, 5th PIP and 5th MCP normal.    Muscle Strength (0-5 scale):  Biceps: 5/5   Distal Lower Extremity: 5    Left Side Rheumatological Exam     Examination finds the shoulder, elbow, wrist, knee, 1st PIP, 1st MCP, 2nd PIP, 2nd MCP, 3rd PIP, 3rd MCP, 4th PIP, 4th MCP, 5th PIP and 5th MCP normal.    Muscle Strength (0-5 scale):  Biceps: 5/5   Distal Lower Extremity: 5      Neurological: She is alert and oriented to person, place, and time. No cranial nerve deficit.   Skin: Skin is warm and dry. No rash noted. She is not diaphoretic. No erythema.     Musculoskeletal: Normal range of motion. She exhibits no edema.         Significant Labs:  BMP:   Recent Labs  Lab 07/23/18  0821   *      K 4.0   *   CO2 23   BUN 69*   CREATININE 4.0*   CALCIUM 8.3*     CBC:   Recent Labs  Lab 07/23/18  0821   WBC 13.29*   HGB 9.2*   HCT 27.1*        CMP:   Recent Labs  Lab 07/23/18  0821   *   CALCIUM 8.3*   ALBUMIN 3.5      K 4.0   CO2 23   *   BUN 69*   CREATININE 4.0*       Significant Imaging:  Imaging results within the past 24 hours have been reviewed.

## 2018-07-23 NOTE — ASSESSMENT & PLAN NOTE
UA is +N with >100 WBC,.  Ucx has e. Coli.  -- talked to ID pharmacist who recommends IV meropenem due to MARKEL and drug allergies.  Last dose Tuesday am.

## 2018-07-23 NOTE — NURSING
Dr. Guadarrama made aware of heart rate persistent in low 50's past 1-2 hours, and BP is elevated.  Patient reports good relief of lupus/back pain, but now reports indigestion.  Will rx prn.

## 2018-07-23 NOTE — ASSESSMENT & PLAN NOTE
Seen by heme in the past.  By history (no melena or BRBPR) this is either a hemolytic anemia or a very slow GIB.  Haptoglobin <10, ldh elevated, edmundo +,  and retic high at 3.9 all concerning for hemolysis.  Received two units of PRBC.  - needs c-scope as outpt for screening too due to her age  - hem consulted for hemolysis  - transfuse to keep >7; might be able to DC tomorrow if it continues to improve

## 2018-07-23 NOTE — SUBJECTIVE & OBJECTIVE
Interval History: NAEON.  Patient is doing well, would like to leave the hospital soon.  No active bleeding noticed.  UOP 700cc with 4 unmeasured occurences    Review of patient's allergies indicates:   Allergen Reactions    Ciprofloxacin Hives    Avelox [moxifloxacin] Rash    Iodinated contrast- oral and iv dye Itching and Swelling    Reglan [metoclopramide hcl] Other (See Comments)     Nervous and paranoid    Amoxicillin Edema     Face and lips became swollen with rash.     Current Facility-Administered Medications   Medication Frequency    0.9%  NaCl infusion (for blood administration) Q24H PRN    acetaminophen tablet 650 mg Q4H PRN    folic acid tablet 1 mg Daily    HYDROcodone-acetaminophen 5-325 mg per tablet 1 tablet Q4H PRN    hydrocortisone 2.5 % rectal cream BID    meropenem 1 g in sodium chloride 0.9 % 100 mL IVPB (ready to mix system) Q12H    mycophenolate capsule 500 mg QHS    ondansetron injection 4 mg Q8H PRN    oxyCODONE immediate release tablet 15 mg Q4H PRN    pantoprazole EC tablet 40 mg Daily    predniSONE tablet 60 mg Daily    sodium bicarbonate tablet 1,300 mg BID    sodium chloride 0.9% flush 3 mL Q6H PRN       Objective:     Vital Signs (Most Recent):  Temp: 97.6 °F (36.4 °C) (07/23/18 0847)  Pulse: (!) 57 (07/23/18 0847)  Resp: 17 (07/23/18 0847)  BP: (!) 171/85 (07/23/18 0847)  SpO2: 100 % (07/23/18 0847)  O2 Device (Oxygen Therapy): room air (07/23/18 0847) Vital Signs (24h Range):  Temp:  [97.1 °F (36.2 °C)-97.9 °F (36.6 °C)] 97.6 °F (36.4 °C)  Pulse:  [44-67] 57  Resp:  [16-18] 17  SpO2:  [97 %-100 %] 100 %  BP: (135-171)/(71-85) 171/85     Weight: 78.7 kg (173 lb 9.6 oz) (07/19/18 1230)  Body mass index is 28.89 kg/m².  Body surface area is 1.9 meters squared.    I/O last 3 completed shifts:  In: 1260 [P.O.:1160; IV Piggyback:100]  Out: 1250 [Urine:1250]    Physical Exam   Constitutional: She is oriented to person, place, and time. No distress.   HENT:   Head:  Normocephalic and atraumatic.   Right Ear: External ear normal.   Left Ear: External ear normal.   Eyes: Conjunctivae are normal. Left eye exhibits no discharge. No scleral icterus.   Neck: Normal range of motion. Neck supple. No JVD present.   Cardiovascular: Normal rate, regular rhythm and normal heart sounds.    Pulmonary/Chest: Effort normal and breath sounds normal.   Abdominal: Soft. Bowel sounds are normal. She exhibits no distension. There is no tenderness.   Musculoskeletal: Normal range of motion. She exhibits no edema.   Neurological: She is alert and oriented to person, place, and time.   Skin: Skin is warm and dry.   Psychiatric: She has a normal mood and affect. Her behavior is normal.       Significant Labs:  CBC:   Recent Labs  Lab 07/22/18  0607   WBC 11.81   RBC 2.40*   HGB 7.6*   HCT 22.0*      MCV 92   MCH 31.7*   MCHC 34.5     CMP:   Recent Labs  Lab 07/19/18  0908  07/21/18  0513     < > 127*   CALCIUM 9.1  < > 8.4*   ALBUMIN 4.0  < > 3.4*   PROT 7.9  --   --      < > 141   K 4.5  < > 5.2*   CO2 15*  < > 18*   *  < > 113*   BUN 60*  < > 62*   CREATININE 5.5*  < > 4.6*   ALKPHOS 56  --   --    ALT 10  --   --    AST 22  --   --    BILITOT 0.9  --   --    < > = values in this interval not displayed.

## 2018-07-23 NOTE — ASSESSMENT & PLAN NOTE
Per clinic chart review she is usually controlled without meds.  Recent elevated readings likely due to steroid use.  Will hold on BP meds unless renal wishes them for kidney protection.

## 2018-07-23 NOTE — PLAN OF CARE
Problem: Patient Care Overview  Goal: Plan of Care Review  Outcome: Ongoing (interventions implemented as appropriate)   07/20/18 5123   Coping/Psychosocial   Plan Of Care Reviewed With patient;spouse       Problem: Pressure Ulcer Risk (Claudio Scale) (Adult,Obstetrics,Pediatric)  Goal: Identify Related Risk Factors and Signs and Symptoms  Related risk factors and signs and symptoms are identified upon initiation of Human Response Clinical Practice Guideline (CPG)   Outcome: Ongoing (interventions implemented as appropriate)   07/21/18 0027   Pressure Ulcer Risk (Claudio Scale)   Related Risk Factors (Pressure Ulcer Risk (Claudio Scale)) medication;mobility impaired       Problem: Infection, Risk/Actual (Adult)  Goal: Identify Related Risk Factors and Signs and Symptoms  Related risk factors and signs and symptoms are identified upon initiation of Human Response Clinical Practice Guideline (CPG)   Outcome: Ongoing (interventions implemented as appropriate)   07/21/18 0027   Infection, Risk/Actual   Related Risk Factors (Infection, Risk/Actual) chronic illness/condition;medication effects   Signs and Symptoms (Infection, Risk/Actual) pain       Problem: Constipation (Adult)  Goal: Identify Related Risk Factors and Signs and Symptoms  Related risk factors and signs and symptoms are identified upon initiation of Human Response Clinical Practice Guideline (CPG)   Outcome: Ongoing (interventions implemented as appropriate)   07/21/18 0027   Constipation   Related Risk Factors (Constipation) medication effects;painful defecation   Signs and Symptoms (Constipation) rectal pain/pressure;straining       Problem: Pain, Acute (Adult)  Goal: Identify Related Risk Factors and Signs and Symptoms  Related risk factors and signs and symptoms are identified upon initiation of Human Response Clinical Practice Guideline (CPG)   Outcome: Ongoing (interventions implemented as appropriate)   07/21/18 0027   Pain, Acute   Related Risk  Factors (Acute Pain) persistent pain   Signs and Symptoms (Acute Pain) constipation/diarrhea;sleep pattern alteration;moaning       Problem: Anemia (Adult)  Goal: Identify Related Risk Factors and Signs and Symptoms  Related risk factors and signs and symptoms are identified upon initiation of Human Response Clinical Practice Guideline (CPG)   Outcome: Ongoing (interventions implemented as appropriate)   07/21/18 0027   Anemia   Related Risk Factors (Anemia) chronic illness   Signs and Symptoms (Anemia) fatigue       Problem: Fall Risk (Adult)  Goal: Identify Related Risk Factors and Signs and Symptoms  Related risk factors and signs and symptoms are identified upon initiation of Human Response Clinical Practice Guideline (CPG)   Outcome: Ongoing (interventions implemented as appropriate)   07/21/18 2228   Fall Risk   Related Risk Factors (Fall Risk) sleep pattern alteration;environment unfamiliar

## 2018-07-23 NOTE — ASSESSMENT & PLAN NOTE
Initial BMP showed Cr of 5.5, BUN 60.  She is followed by [facility] and has a baseline Cr of 3.  UA showed 2+ proteinuria, +RBC, +WBC, +Nitrites, +bacteria.  P/Cr ratio is 0.61, Stella 39, Upr 63.  Calculated FeNa shows 2.4% which signals an intrinsic etiology more likely than pre-renal.  This could be due to lupus flare, infection, or maybe due to hemolytic anemia.  Complement levels were normal, but ferritin, ESR, and CRP were elevated.  Hemolytic labs showed low haptoglobin and a normocytic anemia.  Obstructive etiology is unlikely due to renal U/S showing no evidence of stone, and no hydronephrosis.  Seems like there also might be some pre-renal compenent as Cr improved after most recent set of labs from 5.5 to 4.7, possibly due to 1u PRBC and fluids administered.  This is likely caused by the patient not being on cellcept for 2 weeks prior to admission, as she has been restarted on her medications, her kidney function, and overall function has improved.      1. Maintain hemodynamic stability; maintain MAP >65  2. Strict I/O, Hgb >7 gm/dL, daily weight and chart  3. Continue cellcept 500mg qd and prednisone  4. U/S showed no evidence of hydronephrosis or issues with RBF  5. qD BMP to assess renal function

## 2018-07-24 ENCOUNTER — TELEPHONE (OUTPATIENT)
Dept: RHEUMATOLOGY | Facility: CLINIC | Age: 53
End: 2018-07-24

## 2018-07-24 VITALS
BODY MASS INDEX: 28.93 KG/M2 | SYSTOLIC BLOOD PRESSURE: 184 MMHG | WEIGHT: 173.63 LBS | RESPIRATION RATE: 16 BRPM | HEART RATE: 53 BPM | HEIGHT: 65 IN | TEMPERATURE: 98 F | DIASTOLIC BLOOD PRESSURE: 85 MMHG | OXYGEN SATURATION: 100 %

## 2018-07-24 PROBLEM — N30.00 ACUTE CYSTITIS WITHOUT HEMATURIA: Status: RESOLVED | Noted: 2018-07-20 | Resolved: 2018-07-24

## 2018-07-24 LAB
ALBUMIN SERPL BCP-MCNC: 3.3 G/DL
ANION GAP SERPL CALC-SCNC: 12 MMOL/L
BASOPHILS # BLD AUTO: 0.01 K/UL
BASOPHILS NFR BLD: 0.1 %
BUN SERPL-MCNC: 68 MG/DL
CALCIUM SERPL-MCNC: 8.6 MG/DL
CHLORIDE SERPL-SCNC: 113 MMOL/L
CO2 SERPL-SCNC: 23 MMOL/L
CREAT SERPL-MCNC: 3.6 MG/DL
DIFFERENTIAL METHOD: ABNORMAL
EOSINOPHIL # BLD AUTO: 0 K/UL
EOSINOPHIL NFR BLD: 0 %
ERYTHROCYTE [DISTWIDTH] IN BLOOD BY AUTOMATED COUNT: 17.6 %
EST. GFR  (AFRICAN AMERICAN): 15.6 ML/MIN/1.73 M^2
EST. GFR  (NON AFRICAN AMERICAN): 13.5 ML/MIN/1.73 M^2
GLUCOSE SERPL-MCNC: 103 MG/DL
HCT VFR BLD AUTO: 26.7 %
HGB BLD-MCNC: 9.2 G/DL
IMM GRANULOCYTES # BLD AUTO: 0.28 K/UL
IMM GRANULOCYTES NFR BLD AUTO: 2.4 %
LYMPHOCYTES # BLD AUTO: 2 K/UL
LYMPHOCYTES NFR BLD: 17.2 %
MCH RBC QN AUTO: 31.5 PG
MCHC RBC AUTO-ENTMCNC: 34.5 G/DL
MCV RBC AUTO: 91 FL
MONOCYTES # BLD AUTO: 1 K/UL
MONOCYTES NFR BLD: 8.2 %
NEUTROPHILS # BLD AUTO: 8.4 K/UL
NEUTROPHILS NFR BLD: 72.1 %
NRBC BLD-RTO: 0 /100 WBC
PHOSPHATE SERPL-MCNC: 4.4 MG/DL
PLATELET # BLD AUTO: 303 K/UL
PMV BLD AUTO: 10 FL
POTASSIUM SERPL-SCNC: 4.1 MMOL/L
RBC # BLD AUTO: 2.92 M/UL
SODIUM SERPL-SCNC: 148 MMOL/L
WBC # BLD AUTO: 11.63 K/UL

## 2018-07-24 PROCEDURE — 36415 COLL VENOUS BLD VENIPUNCTURE: CPT

## 2018-07-24 PROCEDURE — 25000003 PHARM REV CODE 250: Performed by: HOSPITALIST

## 2018-07-24 PROCEDURE — 63600175 PHARM REV CODE 636 W HCPCS: Performed by: HOSPITALIST

## 2018-07-24 PROCEDURE — 99239 HOSP IP/OBS DSCHRG MGMT >30: CPT | Mod: ,,, | Performed by: INTERNAL MEDICINE

## 2018-07-24 PROCEDURE — 25000003 PHARM REV CODE 250

## 2018-07-24 PROCEDURE — 80069 RENAL FUNCTION PANEL: CPT

## 2018-07-24 PROCEDURE — 85025 COMPLETE CBC W/AUTO DIFF WBC: CPT

## 2018-07-24 RX ORDER — MYCOPHENOLATE MOFETIL 500 MG/1
500 TABLET ORAL NIGHTLY
Qty: 30 TABLET | Refills: 0 | Status: SHIPPED | OUTPATIENT
Start: 2018-07-24 | End: 2018-09-06

## 2018-07-24 RX ORDER — PREDNISONE 20 MG/1
60 TABLET ORAL DAILY
Qty: 90 TABLET | Refills: 0 | Status: SHIPPED | OUTPATIENT
Start: 2018-07-25 | End: 2018-08-06 | Stop reason: SDUPTHER

## 2018-07-24 RX ORDER — SODIUM BICARBONATE 650 MG/1
1300 TABLET ORAL 2 TIMES DAILY
Qty: 120 TABLET | Refills: 1 | COMMUNITY
Start: 2018-07-24 | End: 2024-01-30

## 2018-07-24 RX ORDER — PANTOPRAZOLE SODIUM 40 MG/1
40 TABLET, DELAYED RELEASE ORAL DAILY
Qty: 30 TABLET | Refills: 5 | Status: SHIPPED | OUTPATIENT
Start: 2018-07-25 | End: 2018-08-06 | Stop reason: SDUPTHER

## 2018-07-24 RX ORDER — FOLIC ACID 1 MG/1
1 TABLET ORAL DAILY
Qty: 30 TABLET | Refills: 11 | Status: SHIPPED | OUTPATIENT
Start: 2018-07-25 | End: 2021-10-19

## 2018-07-24 RX ADMIN — OXYCODONE HYDROCHLORIDE 15 MG: 5 TABLET ORAL at 09:07

## 2018-07-24 RX ADMIN — PREDNISONE 60 MG: 20 TABLET ORAL at 09:07

## 2018-07-24 RX ADMIN — SODIUM BICARBONATE 650 MG TABLET 1300 MG: at 09:07

## 2018-07-24 RX ADMIN — PANTOPRAZOLE SODIUM 40 MG: 40 TABLET, DELAYED RELEASE ORAL at 09:07

## 2018-07-24 RX ADMIN — FOLIC ACID 1 MG: 1 TABLET ORAL at 09:07

## 2018-07-24 RX ADMIN — HYDROCORTISONE 2.5%: 25 CREAM TOPICAL at 09:07

## 2018-07-24 RX ADMIN — MEROPENEM 1 G: 1 INJECTION, POWDER, FOR SOLUTION INTRAVENOUS at 03:07

## 2018-07-24 RX ADMIN — OXYCODONE HYDROCHLORIDE 15 MG: 5 TABLET ORAL at 03:07

## 2018-07-24 NOTE — PROGRESS NOTES
[facility]  Nephrology  Progress Note    Patient Name: Talya Bates  MRN: [id number]  Admission Date: 7/19/2018  Hospital Length of Stay: 5 days  Attending Provider: Vannessa Rizzo MD   Primary Care Physician: eLe Mcnair MD  Principal Problem:Lupus (systemic lupus erythematosus)    Subjective:     HPI: Ms. Bates is a 54 y/o W with a PMHx of SLE, DVT, hemolytic anemia, class IV lupus nephritis that presented to the ED with complaints of SOB, mild abdominal pain, diarrhea, and decreased appetite.  Describes the SOB at both rest and exertion.  She denies a sedentary lifestyle, HRT, or recent long travel. She also has had hemorrhoid pain, and some lightheadedness/dizziness recently.  No issues with urinary urgency, incontinence, or dysuria.  She denies any other urinary complaint.    Her last received a blood transfusion 6 years ago, shortly after being diagnosed with a DVT. She denies blood in her stool, melena, fevers, chills, chest pain, headache, or any other medical complaints.      She ran our of her cellcept two weeks ago and is waiting on her MD to renew it.  She has had some hair loss in that time, but no joint pain or rash.  Her flares usually present with weakness such as this.    Interval History: RENETTA, patient is expecting to be discharged today.    Review of patient's allergies indicates:   Allergen Reactions    Ciprofloxacin Hives    Avelox [moxifloxacin] Rash    Iodinated contrast- oral and iv dye Itching and Swelling    Reglan [metoclopramide hcl] Other (See Comments)     Nervous and paranoid    Amoxicillin Edema     Face and lips became swollen with rash.     Current Facility-Administered Medications   Medication Frequency    0.9%  NaCl infusion (for blood administration) Q24H PRN    acetaminophen tablet 650 mg Q4H PRN    calcium carbonate 200 mg calcium (500 mg) chewable tablet 1,000 mg TID PRN    folic acid tablet 1 mg Daily    HYDROcodone-acetaminophen  5-325 mg per tablet 1 tablet Q4H PRN    hydrocortisone 2.5 % rectal cream BID    mycophenolate capsule 500 mg QHS    ondansetron injection 4 mg Q8H PRN    oxyCODONE immediate release tablet 15 mg Q4H PRN    pantoprazole EC tablet 40 mg Daily    predniSONE tablet 60 mg Daily    sodium bicarbonate tablet 1,300 mg BID    sodium chloride 0.9% flush 3 mL Q6H PRN       Objective:     Vital Signs (Most Recent):  Temp: 97.8 °F (36.6 °C) (07/24/18 0345)  Pulse: (!) 46 (07/24/18 0800)  Resp: 16 (07/24/18 0345)  BP: (!) 179/86 (07/24/18 0345)  SpO2: 100 % (07/24/18 0345)  O2 Device (Oxygen Therapy): room air (07/24/18 0345) Vital Signs (24h Range):  Temp:  [97.5 °F (36.4 °C)-97.8 °F (36.6 °C)] 97.8 °F (36.6 °C)  Pulse:  [42-62] 46  Resp:  [16-17] 16  SpO2:  [98 %-100 %] 100 %  BP: (168-182)/(76-86) 179/86     Weight: 78.7 kg (173 lb 9.6 oz) (07/19/18 1230)  Body mass index is 28.89 kg/m².  Body surface area is 1.9 meters squared.    I/O last 3 completed shifts:  In: 1580 [P.O.:1380; IV Piggyback:200]  Out: 1725 [Urine:1725]    Physical Exam   Constitutional: She is oriented to person, place, and time. No distress.   HENT:   Head: Normocephalic and atraumatic.   Right Ear: External ear normal.   Left Ear: External ear normal.   Eyes: Conjunctivae are normal. Left eye exhibits no discharge. No scleral icterus.   Neck: Normal range of motion. Neck supple. No JVD present.   Cardiovascular: Normal rate, regular rhythm and normal heart sounds.    Pulmonary/Chest: Effort normal and breath sounds normal.   Abdominal: Soft. Bowel sounds are normal. She exhibits no distension. There is no tenderness.   Musculoskeletal: Normal range of motion. She exhibits no edema.   Neurological: She is alert and oriented to person, place, and time.   Skin: Skin is warm and dry.   Psychiatric: She has a normal mood and affect. Her behavior is normal.       Significant Labs:  CBC:   Recent Labs  Lab 07/24/18  0519   WBC 11.63   RBC 2.92*   HGB  9.2*   HCT 26.7*      MCV 91   MCH 31.5*   MCHC 34.5     CMP:   Recent Labs  Lab 07/19/18  0908  07/23/18  0821     < > 163*   CALCIUM 9.1  < > 8.3*   ALBUMIN 4.0  < > 3.5   PROT 7.9  --   --      < > 143   K 4.5  < > 4.0   CO2 15*  < > 23   *  < > 111*   BUN 60*  < > 69*   CREATININE 5.5*  < > 4.0*   ALKPHOS 56  --   --    ALT 10  --   --    AST 22  --   --    BILITOT 0.9  --   --    < > = values in this interval not displayed.         Assessment/Plan:     MARKEL (acute kidney injury)    Initial BMP showed Cr of 5.5, BUN 60.  She is followed by [facility] and has a baseline Cr of 3.  UA showed 2+ proteinuria, +RBC, +WBC, +Nitrites, +bacteria.  P/Cr ratio is 0.61, Stella 39, Upr 63.  Calculated FeNa shows 2.4% which signals an intrinsic etiology more likely than pre-renal.  This could be due to lupus flare, infection, or maybe due to hemolytic anemia.  Complement levels were normal, but ferritin, ESR, and CRP were elevated.  Hemolytic labs showed low haptoglobin and a normocytic anemia.  Obstructive etiology is unlikely due to renal U/S showing no evidence of stone, and no hydronephrosis.  Seems like there also might be some pre-renal compenent as Cr improved after most recent set of labs from 5.5 to 4.7, possibly due to 1u PRBC and fluids administered.  This is likely caused by the patient not being on cellcept for 2 weeks prior to admission, as she has been restarted on her medications, her kidney function, and overall function has improved.      1. Maintain hemodynamic stability; maintain MAP >65  2. Strict I/O, Hgb >7 gm/dL, daily weight and chart  3. Continue cellcept 500mg qd and prednisone  4. U/S showed no evidence of hydronephrosis or issues with RBF  5. qD BMP to assess renal function  6. Follow up with [facility] after discharge              Thank you for your consult.  Christopher De La Vega MD  Nephrology  [facility]

## 2018-07-24 NOTE — ASSESSMENT & PLAN NOTE
Initial BMP showed Cr of 5.5, BUN 60.  She is followed by [facility] and has a baseline Cr of 3.  UA showed 2+ proteinuria, +RBC, +WBC, +Nitrites, +bacteria.  P/Cr ratio is 0.61, Stella 39, Upr 63.  Calculated FeNa shows 2.4% which signals an intrinsic etiology more likely than pre-renal.  This could be due to lupus flare, infection, or maybe due to hemolytic anemia.  Complement levels were normal, but ferritin, ESR, and CRP were elevated.  Hemolytic labs showed low haptoglobin and a normocytic anemia.  Obstructive etiology is unlikely due to renal U/S showing no evidence of stone, and no hydronephrosis.  Seems like there also might be some pre-renal compenent as Cr improved after most recent set of labs from 5.5 to 4.7, possibly due to 1u PRBC and fluids administered.  This is likely caused by the patient not being on cellcept for 2 weeks prior to admission, as she has been restarted on her medications, her kidney function, and overall function has improved.      1. Maintain hemodynamic stability; maintain MAP >65  2. Strict I/O, Hgb >7 gm/dL, daily weight and chart  3. Continue cellcept 500mg qd and prednisone  4. U/S showed no evidence of hydronephrosis or issues with RBF  5. qD BMP to assess renal function  6. Follow up with [facility] after discharge

## 2018-07-24 NOTE — TELEPHONE ENCOUNTER
----- Message from Omari Negrete LPN sent at 7/23/2018  7:47 PM CDT -----   Ma'am,     When would you like this patient seen?    Thanks, omari  ----- Message -----  From: Sue Hatfield MD  Sent: 7/23/2018   4:38 PM  To: Omari Negrete LPN    Hi,   This is a pt of Dr. Dickerson. She will be discharged tomorrow. Has SLE/lupus nephritis admitted for hemolytic anemia. She needs follow up in 2-3 weeks with Dr Dickerson     Thank you   Sue

## 2018-07-24 NOTE — PLAN OF CARE
07/24/2018      Talya Bates  [address] [facility] [facility] [address]          Hospital Medicine Dept.  [facility] [address] [place] [address]  [phone] after hours  [phone] fax Talya Bates has been hospitalized at the [facility] since 7/19/2018.  Please excuse the patient from duties.        Patient may return after outpatient follow up.  No restrictions.     Please contact me if you have any questions.        __e-sig______  Vannessa Rizzo MD  07/24/2018

## 2018-07-24 NOTE — PROGRESS NOTES
AVS d/c instructions given to patient, voices understanding. Telemetry d/enid, PIV d/enid with cath intact, gauze dressing applied.  Patient amb per self with family off unit.

## 2018-07-24 NOTE — SUBJECTIVE & OBJECTIVE
Interval History: NAEON, patient is expecting to be discharged today.    Review of patient's allergies indicates:   Allergen Reactions    Ciprofloxacin Hives    Avelox [moxifloxacin] Rash    Iodinated contrast- oral and iv dye Itching and Swelling    Reglan [metoclopramide hcl] Other (See Comments)     Nervous and paranoid    Amoxicillin Edema     Face and lips became swollen with rash.     Current Facility-Administered Medications   Medication Frequency    0.9%  NaCl infusion (for blood administration) Q24H PRN    acetaminophen tablet 650 mg Q4H PRN    calcium carbonate 200 mg calcium (500 mg) chewable tablet 1,000 mg TID PRN    folic acid tablet 1 mg Daily    HYDROcodone-acetaminophen 5-325 mg per tablet 1 tablet Q4H PRN    hydrocortisone 2.5 % rectal cream BID    mycophenolate capsule 500 mg QHS    ondansetron injection 4 mg Q8H PRN    oxyCODONE immediate release tablet 15 mg Q4H PRN    pantoprazole EC tablet 40 mg Daily    predniSONE tablet 60 mg Daily    sodium bicarbonate tablet 1,300 mg BID    sodium chloride 0.9% flush 3 mL Q6H PRN       Objective:     Vital Signs (Most Recent):  Temp: 97.8 °F (36.6 °C) (07/24/18 0345)  Pulse: (!) 46 (07/24/18 0800)  Resp: 16 (07/24/18 0345)  BP: (!) 179/86 (07/24/18 0345)  SpO2: 100 % (07/24/18 0345)  O2 Device (Oxygen Therapy): room air (07/24/18 0345) Vital Signs (24h Range):  Temp:  [97.5 °F (36.4 °C)-97.8 °F (36.6 °C)] 97.8 °F (36.6 °C)  Pulse:  [42-62] 46  Resp:  [16-17] 16  SpO2:  [98 %-100 %] 100 %  BP: (168-182)/(76-86) 179/86     Weight: 78.7 kg (173 lb 9.6 oz) (07/19/18 1230)  Body mass index is 28.89 kg/m².  Body surface area is 1.9 meters squared.    I/O last 3 completed shifts:  In: 1580 [P.O.:1380; IV Piggyback:200]  Out: 1725 [Urine:1725]    Physical Exam   Constitutional: She is oriented to person, place, and time. No distress.   HENT:   Head: Normocephalic and atraumatic.   Right Ear: External ear normal.   Left Ear: External ear normal.    Eyes: Conjunctivae are normal. Left eye exhibits no discharge. No scleral icterus.   Neck: Normal range of motion. Neck supple. No JVD present.   Cardiovascular: Normal rate, regular rhythm and normal heart sounds.    Pulmonary/Chest: Effort normal and breath sounds normal.   Abdominal: Soft. Bowel sounds are normal. She exhibits no distension. There is no tenderness.   Musculoskeletal: Normal range of motion. She exhibits no edema.   Neurological: She is alert and oriented to person, place, and time.   Skin: Skin is warm and dry.   Psychiatric: She has a normal mood and affect. Her behavior is normal.       Significant Labs:  CBC:   Recent Labs  Lab 07/24/18  0519   WBC 11.63   RBC 2.92*   HGB 9.2*   HCT 26.7*      MCV 91   MCH 31.5*   MCHC 34.5     CMP:   Recent Labs  Lab 07/19/18  0908  07/23/18  0821     < > 163*   CALCIUM 9.1  < > 8.3*   ALBUMIN 4.0  < > 3.5   PROT 7.9  --   --      < > 143   K 4.5  < > 4.0   CO2 15*  < > 23   *  < > 111*   BUN 60*  < > 69*   CREATININE 5.5*  < > 4.0*   ALKPHOS 56  --   --    ALT 10  --   --    AST 22  --   --    BILITOT 0.9  --   --    < > = values in this interval not displayed.

## 2018-07-24 NOTE — DISCHARGE SUMMARY
"Discharge Summary  Hospital Medicine    Patient Name: Talya Bates  MRN: [id number]  Attending Provider on Discharge: Vannessa Rizzo MD  Hospital Medicine Team: [facility]  Date of Admission:  7/19/2018     Date of Discharge:  7/24/2018 11:06 AM  Code status: Full Code    Active Hospital Problems    Diagnosis  POA    *MARKEL (acute kidney injury) [N17.9]  Yes    Elevated blood pressure reading [R03.0]  No    Hyperkalemia [E87.5]  Yes    Bleeding hemorrhoid [K64.9]  Yes    CKD (chronic kidney disease), stage IV [N18.4]  Yes    Lupus nephritis [M32.14]  Yes    Lupus (systemic lupus erythematosus) [M32.9]  Yes    Hemolytic anemia [D58.9]  Yes      Resolved Hospital Problems    Diagnosis Date Resolved POA    Acute cystitis without hematuria [N30.00] 07/24/2018 Yes        HPI: "54 yo F with a PMHx of SLE, DVT, hemolytic anemia, class IV lupus nephritis presented with complaints of SOB, mild abdominal pain, diarrhea, and decreased appetite.  Describes the SOB at both rest and exertion.  She denies a sedentary lifestyle, HRT, or recent long travel. She also has had hemorrhoid pain, and some lightheadedness/dizziness recently.  No issues with urinary urgency, incontinence, or dysuria. She denies any other urinary complaint. Her last received a blood transfusion 6 years ago, shortly after being diagnosed with a DVT. She denies blood in her stool, melena, fevers, chills, chest pain, headache, or any other medical complaints. She ran our of her Cellcept two weeks ago and is waiting on her MD to renew it.  She has had some hair loss in that time, but no joint pain or rash.  Her flares usually present with weakness such as this."    Hospital Course:      MARKEL (acute kidney injury)     "Initial BMP showed Cr of 5.5, BUN 60.  She is followed by [facility] and has a baseline Cr of 3.  UA showed 2+ proteinuria, +RBC, +WBC, +Nitrites, +bacteria.  P/Cr ratio is 0.61, Stella 39, Upr 63.  Calculated FeNa shows 2.4% " "which signals an intrinsic etiology more likely than pre-renal.  This could be due to lupus flare, infection, or maybe due to hemolytic anemia.  Complement levels were normal, but ferritin, ESR, and CRP were elevated.  Hemolytic labs showed low haptoglobin and a normocytic anemia.  Obstructive etiology is unlikely due to renal U/S showing no evidence of stone, and no hydronephrosis.  Seems like there also might be some pre-renal compenent as Cr improved from 5.5 to 4.7, possibly due to 1unit PRBC and IV fluids administered.  This is likely caused by the patient not being on Cellcept for 2 weeks prior to admission, as she has been restarted on her medications, her kidney function, and overall function has improved."  Plan:  Continue cellcept 500mg qd and prednisone  Follow up with [facility] after discharge. Referral to [facility] nephrology made at patient request.           Lupus (systemic lupus erythematosus)     "Patient was found to have hgb of 6.2 (9 in Jan 2018), with no signs of bleeding. 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 HER SLEDAI is 8 (arthritis and proteinuria) although her proteinuria has improved. Her complements are normal, dsDNA negative. This is likely a mild flare in context of not taking Cellcept for 2 weeks."  "Lupus flare with hemolytic anemia and arthralgia improved after IV Solumedrol.  Flare partly triggered by her " running out" of MMF.  Transient increase in creatinine was more likely hemodynamic and not exacerbation of lupus nephritis."  Treatment:  Solumedrol x1 125mg on 7/19  Prednisone 60mg daily on 7/20 to current   Cellcept 500mg daily on 7/19 to current   Plan:  -continue prednisone 60mg daily at this time.   -continue Cellcept 500mg daily   -Follow up with Dr. Dickerson in 2-3 weeks  -She will also need to follow up with Yas and the study she is involved in   -Follow up with Heme/Onc as well. "      Elevated blood pressure reading     Per clinic chart review her BP is usually controlled without meds.  Recent elevated readings likely due to steroid use.  Will hold on BP meds unless Nephrology recommends them for kidney protection.          Hyperkalemia     K 5.5 on 7/20  - Kayexalate and telemetry          Acute cystitis without hematuria     UA was +N with >100 WBC,.  Ucx has E. coli.  -- treated with IV meropenem (4-day course) due to MARKEL and drug allergies.           Bleeding hemorrhoid     Pt denies seeing any blood at home so unlikely that this is the cause of her anemia.    Still painful  -- topical steroids for hemorrhoidal pain PRN          Lupus nephritis     CKD (chronic kidney disease), stage IV with MARKEL  Baseline Cr of 2.5 - 3.0, up to 5.5.  P:CR elevated at 0.61.    U/S showed no evidence of hydronephrosis or issues with RBF  - nephrology consulted given lupus, low bicarb,and high BUN  - started on bicarb for metabolic acidosis          Hemolytic anemia     Seen by Heme/Onc in the past.  By history (no melena or BRBPR) this is either a hemolytic anemia or a very slow GIB.  Haptoglobin <10, LDH elevated, Zackary +,  and retic high at 3.9 all concerning for hemolysis.  Received two units of PRBC.  - needs C-scope as outpt for screening too due to her age  - Hem/Onc consulted for hemolysis. H&H stable.          Recent Labs  Lab 07/22/18  0607 07/23/18  0821 07/24/18  0519   WBC 11.81 13.29* 11.63   HGB 7.6* 9.2* 9.2*   HCT 22.0* 27.1* 26.7*    326 303       Recent Labs  Lab 07/21/18  0513 07/23/18  0821 07/24/18  0519    143 148*   K 5.2* 4.0 4.1   * 111* 113*   CO2 18* 23 23   BUN 62* 69* 68*   CREATININE 4.6* 4.0* 3.6*   * 163* 103   CALCIUM 8.4* 8.3* 8.6*   PHOS 5.5* 3.9 4.4       Recent Labs  Lab 07/19/18  0908  07/21/18  0513 07/23/18  0821 07/24/18  0519   ALKPHOS 56  --   --   --   --    ALT 10  --   --   --   --    AST 22  --   --   --   --    ALBUMIN 4.0  < > 3.4*  3.5 3.3*   PROT 7.9  --   --   --   --    BILITOT 0.9  --   --   --   --    < > = values in this interval not displayed.      Procedures: none    Consultants:   Consults         Status Ordering Provider     Inpatient consult to Hematology/Oncology  Once     Provider:  (Not yet assigned)    Completed AYLIN DOWNS     Inpatient consult to Nephrology  Once     Provider:  (Not yet assigned)    Completed AYLIN DOWNS     Inpatient consult to Rheumatology  Once     Provider:  (Not yet assigned)    Completed AYLIN DOWNS          Medications:  Reconciled Home Medications:      Medication List      START taking these medications    folic acid 1 MG tablet  Commonly known as:  FOLVITE  Take 1 tablet (1 mg total) by mouth once daily.  Start taking on:  7/25/2018     pantoprazole 40 MG tablet  Commonly known as:  PROTONIX  Take 1 tablet (40 mg total) by mouth once daily.  Start taking on:  7/25/2018     predniSONE 20 MG tablet  Commonly known as:  DELTASONE  Take 3 tablets (60 mg total) by mouth once daily.  Start taking on:  7/25/2018     sodium bicarbonate 650 MG tablet  Take 2 tablets (1,300 mg total) by mouth 2 (two) times daily.        CHANGE how you take these medications    mycophenolate 500 mg Tab  Commonly known as:  CELLCEPT  Take 1 tablet (500 mg total) by mouth every evening.  What changed:  when to take this        STOP taking these medications    acyclovir 800 MG Tab  Commonly known as:  ZOVIRAX     estradiol 10 mcg Tab     losartan 100 MG tablet  Commonly known as:  COZAAR     losartan 25 MG tablet  Commonly known as:  COZAAR     potassium 99 mg Tab            Discharge Instructions:    Discharge Procedure Orders  Ambulatory Referral to Hematology / Oncology   Referral Priority: Routine Referral Type: Consultation   Referral Reason: Specialty Services Required    Requested Specialty: Hematology and Oncology    Number of Visits Requested: 1      Ambulatory Referral to Nephrology   Referral  Priority: Routine Referral Type: Consultation   Referral Reason: Specialty Services Required    Requested Specialty: Nephrology    Number of Visits Requested: 1      Diet Adult Regular     Notify your health care provider if you experience any of the following:  temperature >100.4     Notify your health care provider if you experience any of the following:  persistent nausea and vomiting or diarrhea     Notify your health care provider if you experience any of the following:  difficulty breathing or increased cough     Notify your health care provider if you experience any of the following:  persistent dizziness, light-headedness, or visual disturbances     Notify your health care provider if you experience any of the following:  increased confusion or weakness     Activity as tolerated         Discharge Condition: stable    Disposition: Home or Self Care    Indwelling Lines/Drains at time of discharge: none    Tests pending at the time of discharge: none      Time spent on the discharge of the patient including review of hospital course with the patient, reviewing discharge medications and arranging follow-up care: 45 minutes.    Discharge examination Patient was seen and examined on 7/24/2018 and determined to be suitable for discharge.    Discharge plan and follow up:  Follow-up Information     Anay Hernández MD. Schedule an appointment as soon as possible for a visit in 2 weeks.    Specialty:  Rheumatology  Why:  For discharge from hospital follow up  Contact information:  [address]  [facility] [address]  [phone]             [facility] HEMATOLOGY/ONCOLOGY. Schedule an appointment as soon as possible for a visit in 2 weeks.    Specialty:  Hematology and Oncology  Why:  To establish care, For discharge from hospital follow up  Contact information:  [address]  [facility] [address]  [phone]           [facility] NEPHROLOGY. Schedule an appointment as soon as possible for a visit  in 2 weeks.    Specialty:  Nephrology  Why:  To establish care, For discharge from hospital follow up  Contact information:  [address]  [facility] [address]  [phone]               Future Appointments  Date Time Provider Department Center   8/15/2018 11:00 AM Anay Hernández MD [facility] RHEUM [facility]   9/17/2018 2:20 PM Johnie Sood MD [facility] NEPHRO [facility]       Provider  Vannessa Rizzo MD  Department of Hospital Medicine  [facility] - [facility]

## 2018-07-25 ENCOUNTER — TELEPHONE (OUTPATIENT)
Dept: HEMATOLOGY/ONCOLOGY | Facility: CLINIC | Age: 53
End: 2018-07-25

## 2018-07-25 ENCOUNTER — TELEPHONE (OUTPATIENT)
Dept: NEPHROLOGY | Facility: CLINIC | Age: 53
End: 2018-07-25

## 2018-07-25 DIAGNOSIS — D59.9 ACQUIRED HEMOLYTIC ANEMIA: Primary | ICD-10-CM

## 2018-07-25 NOTE — TELEPHONE ENCOUNTER
----- Message from Sarahi Mg sent at 7/25/2018  9:20 AM CDT -----  Contact: Bruno adan / [phone]  .Needs Advice    Reason for call:      Communication Preference: call back  Additional Information: pt states she was she needs a two weeks Hospital f/u today 09/21/2048 is to Long

## 2018-07-26 ENCOUNTER — TELEPHONE (OUTPATIENT)
Dept: RHEUMATOLOGY | Facility: CLINIC | Age: 53
End: 2018-07-26

## 2018-07-26 ENCOUNTER — PATIENT OUTREACH (OUTPATIENT)
Dept: ADMINISTRATIVE | Facility: CLINIC | Age: 53
End: 2018-07-26

## 2018-07-26 NOTE — PATIENT INSTRUCTIONS
Discharge Instructions for Acute Kidney Injury  You have been diagnosed with acute kidney injury. This means that your kidneys are not working properly. When both kidneys are healthy, they help filter out fluid and waste from the blood and body. Acute kidney injury has many causes. These include urinary blockages, infection, lack of enough blood supply, and medicines that can injure kidneys. In some cases, acute kidney injury is short-term (temporary), lasting several days to a few months. This is because the kidney can repair itself. But acute kidney injury can also result in chronic kidney disease or end stage renal failure. Here are some instructions for you to follow as you recover.  Home care  · Follow any instructions for eating and drinking given to you by your healthcare provider.  ¨ Drink less fluid, if instructed by your healthcare provider.  ¨ Keep a record of everything you eat and drink.  · Measure the amount of urine and stool you have each day.  · Weigh yourself every day, at the same time of day, and in the same kind of clothes. Keep a daily record of your daily weights.  · Take your temperature every day. Keep a record of the results.  · Learn to take your own blood pressure. Keep a record of your results. Ask your healthcare provider when you should seek emergency medical attention. Your provider will tell you which blood pressure reading is dangerous.  · Avoid contact with people who have infections (colds, bronchitis, or skin conditions).  · Practice good personal hygiene. This is especially important if you have a catheter in place when you leave the hospital. Doing so helps keep you safe from infection.  · Take your medicines exactly as directed.  · You may require frequent blood and urine tests to monitor your kidney function.  Follow-up care  Follow up with your healthcare provider, or as advised.  When to seek medical care  Call your healthcare provider right away if you have any of the  following:  · Signs of bladder infection (urinating more often than usual, or burning, pain, bleeding, or hesitancy when you urinate)  · Signs of infection around your catheter (redness, swelling, warmth, or drainage)  · Rapid weight loss or weight gain, such as 3 pounds or more in 24 hours or 6 pounds or more in 7 days  · Fever above 100.4°F (38.0°C) or chills  · Muscle aches  · Night sweats  · Very little or no urine output  · Swelling of your hands, legs, or feet  · Back pain  · Abdominal pain  · Extreme tiredness   Date Last Reviewed: 2/1/2017  © 0867-2767 [employer]. [address] [place] [address], [place] [address]. All rights reserved. This information is not intended as a substitute for professional medical care. Always follow your healthcare professional's instructions.

## 2018-07-26 NOTE — TELEPHONE ENCOUNTER
----- Message from Marisol Walls MA sent at 7/25/2018  9:51 AM CDT -----  Contact: Pt   Dr polo she is on 60 mg of prednisone. She said they wanted her to be tapered off.  Will aug. Be ok   ----- Message -----  From: Kita Bishop  Sent: 7/25/2018   8:53 AM  To: [facility] Staff    Pt says she need to schedule a hospital f/u visit in 2 weeks     Says she was just released from the hospital, left a message on yesterday and call was not return    Pt is requesting a callback at [phone] to get a 2 weeks visit scheduled    Thanks

## 2018-07-28 ENCOUNTER — TELEPHONE (OUTPATIENT)
Dept: HEMATOLOGY/ONCOLOGY | Facility: HOSPITAL | Age: 53
End: 2018-07-28

## 2018-07-28 NOTE — TELEPHONE ENCOUNTER
----- Message from Jesenia Barksdale RN sent at 7/25/2018 10:31 AM CDT -----  Contact: PTs   Hi Dr. Yu can you call regarding the above patient  CAT Ba  Nurse Navigator  [id number]  ----- Message -----  From: David Hooks RN  Sent: 7/25/2018   9:28 AM  To: [facility] Navigation        ----- Message -----  From: Umm Johnson  Sent: 7/25/2018   9:13 AM  To: , #     calling in regards to rescheduling patient's referral to be seen.   IN 2 weeks needed.    Callback: [phone] ( Bruno -  )

## 2018-07-28 NOTE — TELEPHONE ENCOUNTER
I called the patient back to discuss any questions she may have. The patient stated she did not have any questions.  I informed the patient that we would see her on 8/06/18 to discuss her steroid regimen.        Yrn Yu MD PGY-VI  Hematology and Oncology  Pager:[phone]

## 2018-07-28 NOTE — TELEPHONE ENCOUNTER
I called the patient and left her a message to call me back at [phone].     Yrn Yu MD PGY-VI  Hematology and Oncology  Pager:[phone]

## 2018-07-30 ENCOUNTER — TELEPHONE (OUTPATIENT)
Dept: NEPHROLOGY | Facility: CLINIC | Age: 53
End: 2018-07-30

## 2018-07-30 DIAGNOSIS — N18.4 CHRONIC KIDNEY DISEASE, STAGE IV (SEVERE): Primary | ICD-10-CM

## 2018-07-30 NOTE — TELEPHONE ENCOUNTER
----- Message from Kateryna Mcdonald sent at 7/30/2018  9:51 AM CDT -----  Contact: Talya  tel:    [phone]   Needs Advice    Reason for call:    Pt.says she is trying to do her labs today at [facility] before her appt. Tomorrow w/ Dr. Sood.   [facility] call and send the orders to  [facility] :  It is on [address] in [place], [facility] call ref. This.       Communication Preference: Phone   Additional Information:    Pls call and confirm this w/ her. Pt. Says she has been trying for weeks to set this  Up w/no response from your office.

## 2018-07-30 NOTE — TELEPHONE ENCOUNTER
----- Message from Rehana Dos Santos sent at 7/30/2018  9:58 AM CDT -----  Contact: Self [phone]  PT called with information to send lab orders.   [facility]

## 2018-07-31 ENCOUNTER — OFFICE VISIT (OUTPATIENT)
Dept: NEPHROLOGY | Facility: CLINIC | Age: 53
End: 2018-07-31
Payer: COMMERCIAL

## 2018-07-31 VITALS
DIASTOLIC BLOOD PRESSURE: 90 MMHG | HEART RATE: 72 BPM | OXYGEN SATURATION: 99 % | HEIGHT: 65 IN | WEIGHT: 171.31 LBS | BODY MASS INDEX: 28.54 KG/M2 | SYSTOLIC BLOOD PRESSURE: 180 MMHG

## 2018-07-31 DIAGNOSIS — N39.0 RECURRENT UTI: Primary | ICD-10-CM

## 2018-07-31 DIAGNOSIS — N18.4 CKD (CHRONIC KIDNEY DISEASE), STAGE IV: ICD-10-CM

## 2018-07-31 PROCEDURE — 99213 OFFICE O/P EST LOW 20 MIN: CPT | Mod: S$GLB,,, | Performed by: INTERNAL MEDICINE

## 2018-07-31 PROCEDURE — 3008F BODY MASS INDEX DOCD: CPT | Mod: CPTII,S$GLB,, | Performed by: INTERNAL MEDICINE

## 2018-07-31 PROCEDURE — 99999 PR PBB SHADOW E&M-EST. PATIENT-LVL III: CPT | Mod: PBBFAC,,, | Performed by: INTERNAL MEDICINE

## 2018-08-03 ENCOUNTER — OFFICE VISIT (OUTPATIENT)
Dept: URGENT CARE | Facility: CLINIC | Age: 53
End: 2018-08-03
Payer: COMMERCIAL

## 2018-08-03 VITALS
RESPIRATION RATE: 18 BRPM | HEIGHT: 65 IN | BODY MASS INDEX: 29.16 KG/M2 | WEIGHT: 175 LBS | OXYGEN SATURATION: 99 % | DIASTOLIC BLOOD PRESSURE: 94 MMHG | SYSTOLIC BLOOD PRESSURE: 183 MMHG | HEART RATE: 77 BPM | TEMPERATURE: 98 F

## 2018-08-03 DIAGNOSIS — H66.90 ACUTE OTITIS MEDIA, UNSPECIFIED OTITIS MEDIA TYPE: ICD-10-CM

## 2018-08-03 DIAGNOSIS — L03.317 CELLULITIS OF BUTTOCK: ICD-10-CM

## 2018-08-03 DIAGNOSIS — N18.4 CKD (CHRONIC KIDNEY DISEASE), STAGE IV: ICD-10-CM

## 2018-08-03 DIAGNOSIS — D84.9 IMMUNOSUPPRESSION: ICD-10-CM

## 2018-08-03 DIAGNOSIS — M32.14 LUPUS NEPHRITIS: ICD-10-CM

## 2018-08-03 DIAGNOSIS — R23.8 SKIN BREAKDOWN: Primary | ICD-10-CM

## 2018-08-03 PROCEDURE — 99214 OFFICE O/P EST MOD 30 MIN: CPT | Mod: S$GLB,,, | Performed by: NURSE PRACTITIONER

## 2018-08-03 RX ORDER — LIDOCAINE 40 MG/G
CREAM TOPICAL
Qty: 15 G | Refills: 0 | COMMUNITY
Start: 2018-08-03 | End: 2018-08-13

## 2018-08-03 RX ORDER — CEPHALEXIN 500 MG/1
500 CAPSULE ORAL EVERY 12 HOURS
Qty: 20 CAPSULE | Refills: 0 | Status: SHIPPED | OUTPATIENT
Start: 2018-08-03 | End: 2018-08-13

## 2018-08-03 NOTE — PROGRESS NOTES
"Subjective:       Patient ID: Talya Bates is a 53 y.o. female.    Vitals:  height is 5' 5" (1.651 m) and weight is 79.4 kg (175 lb). Her oral temperature is 98.4 °F (36.9 °C). Her blood pressure is 183/94 (abnormal) and her pulse is 77. Her respiration is 18 and oxygen saturation is 99%.     Chief Complaint: Hemorrhoids and Otalgia    Patient to clinic complaining of hemorrhoids x1 week. She states she had 4 bowel movement this morning. She states she is in a lot of pain. Hx CKD stage IV, recently hospitalization.   Ear pain: reports bilateral ear pain.       Otalgia    There is pain in both ears. This is a new problem. The current episode started today. The problem occurs constantly. The problem has been unchanged. There has been no fever. The pain is at a severity of 4/10. The pain is mild. Associated symptoms include diarrhea and ear discharge. Pertinent negatives include no abdominal pain, coughing, headaches, hearing loss, neck pain, rash, rhinorrhea, sore throat or vomiting. She has tried nothing for the symptoms. The treatment provided no relief. There is no history of a chronic ear infection, hearing loss or a tympanostomy tube.     Review of Systems   Constitution: Negative for chills and fever.   HENT: Positive for ear discharge and ear pain. Negative for hearing loss, rhinorrhea and sore throat.    Eyes: Negative for blurred vision.   Cardiovascular: Negative for chest pain.   Respiratory: Negative for cough and shortness of breath.    Skin: Negative for rash.   Musculoskeletal: Negative for back pain, joint pain and neck pain.   Gastrointestinal: Positive for diarrhea and hemorrhoids. Negative for abdominal pain, constipation, nausea and vomiting.   Neurological: Negative for headaches.   Psychiatric/Behavioral: The patient is not nervous/anxious.        Objective:      Physical Exam   Constitutional: She is oriented to person, place, and time. She appears well-developed and well-nourished. She is " cooperative.  Non-toxic appearance. She does not appear ill. No distress.   HENT:   Head: Normocephalic and atraumatic.   Right Ear: Hearing, external ear and ear canal normal. There is drainage and tenderness. Tympanic membrane is erythematous.   Left Ear: Hearing, external ear and ear canal normal. There is tenderness. Tympanic membrane is erythematous.   Nose: Nose normal. No mucosal edema, rhinorrhea or nasal deformity. No epistaxis. Right sinus exhibits no maxillary sinus tenderness and no frontal sinus tenderness. Left sinus exhibits no maxillary sinus tenderness and no frontal sinus tenderness.   Mouth/Throat: Uvula is midline, oropharynx is clear and moist and mucous membranes are normal. No trismus in the jaw. Normal dentition. No uvula swelling. No posterior oropharyngeal erythema.   Eyes: Conjunctivae and lids are normal. Right eye exhibits no discharge. Left eye exhibits no discharge. No scleral icterus.   Sclera clear bilat   Neck: Trachea normal, normal range of motion, full passive range of motion without pain and phonation normal. Neck supple.   Cardiovascular: Normal rate, regular rhythm, normal heart sounds, intact distal pulses and normal pulses.    Pulmonary/Chest: Effort normal and breath sounds normal. No respiratory distress.   Abdominal: Soft. Normal appearance and bowel sounds are normal. She exhibits no distension, no pulsatile midline mass and no mass. There is no tenderness.   Genitourinary: Rectal exam shows no external hemorrhoid, no internal hemorrhoid and anal tone normal.   Musculoskeletal: Normal range of motion. She exhibits no edema or deformity.   Neurological: She is alert and oriented to person, place, and time. She exhibits normal muscle tone. Coordination normal.   Skin: Skin is warm, dry and intact. Lesion noted. She is not diaphoretic. There is erythema. No pallor.        4 areas approx. 1cm diameter each of skin breakdown along buttocks between butt crease, purulent  drainage noted, moist, no odor. Mild erythema around lesions.  No hemorroids noted externally; +TTP over lesions.   Psychiatric: She has a normal mood and affect. Her speech is normal and behavior is normal. Judgment and thought content normal. Cognition and memory are normal.   Nursing note and vitals reviewed.      Assessment:       1. Skin breakdown    2. Cellulitis of buttock        Plan:         Skin breakdown  -     cephALEXin (KEFLEX) 500 MG capsule; Take 1 capsule (500 mg total) by mouth every 12 (twelve) hours. for 10 days  Dispense: 20 capsule; Refill: 0  -     lidocaine (LMX) 4 % cream; Apply topically as needed.  Dispense: 15 g; Refill: 0    Cellulitis of buttock  -     cephALEXin (KEFLEX) 500 MG capsule; Take 1 capsule (500 mg total) by mouth every 12 (twelve) hours. for 10 days  Dispense: 20 capsule; Refill: 0          Patient Instructions   Keep area dry.  Apply rx lidocaine cream as needed for pain.  Take rx antibiotic, keflex, as directed for the full duration.    Follow up with your doctor in a few days.  Return to the urgent care or go to the ER if symptoms get worse.        Cellulitis  Cellulitis is an infection of the deep layers of skin. A break in the skin, such as a cut or scratch, can let bacteria under the skin. If the bacteria get to deep layers of the skin, it can be serious. If not treated, cellulitis can get into the bloodstream and lymph nodes. The infection can then spread throughout the body. This causes serious illness.  Cellulitis causes the affected skin to become red, swollen, warm, and sore. The reddened areas have a visible border. An open sore may leak fluid (pus). You may have a fever, chills, and pain.  Cellulitis is treated with antibiotics taken for 7 to 10 days. An open sore may be cleaned and covered with cool wet gauze. Symptoms should get better 1 to 2 days after treatment is started. Make sure to take all the antibiotics for the full number of days until they are  gone. Keep taking the medicine even if your symptoms go away.  Home care  Follow these tips:  · Limit the use of the part of your body with cellulitis.   · If the infection is on your leg, keep your leg raised while sitting. This will help to reduce swelling.  · Take all of the antibiotic medicine exactly as directed until it is gone. Do not miss any doses, especially during the first 7 days. Dont stop taking the medicine when your symptoms get better.  · Keep the affected area clean and dry.  · Wash your hands with soap and warm water before and after touching your skin. Anyone else who touches your skin should also wash his or her hands. Don't share towels.  Follow-up care  Follow up with your healthcare provider, or as advised. If your infection does not go away on the first antibiotic, your healthcare provider will prescribe a different one.  When to seek medical advice  Call your healthcare provider right away if any of these occur:  · Red areas that spread  · Swelling or pain that gets worse  · Fluid leaking from the skin (pus)  · Fever higher of 100.4º F (38.0º C) or higher after 2 days on antibiotics  Date Last Reviewed: 9/1/2016  © 7188-7003 [employer]. [address] [place] [address], [place] [address]. All rights reserved. This information is not intended as a substitute for professional medical care. Always follow your healthcare professional's instructions.

## 2018-08-03 NOTE — PATIENT INSTRUCTIONS
Keep area dry.  Apply rx lidocaine cream as needed for pain.  Take rx antibiotic, keflex, as directed for the full duration.    Follow up with your doctor in a few days.  Return to the urgent care or go to the ER if symptoms get worse.        Cellulitis  Cellulitis is an infection of the deep layers of skin. A break in the skin, such as a cut or scratch, can let bacteria under the skin. If the bacteria get to deep layers of the skin, it can be serious. If not treated, cellulitis can get into the bloodstream and lymph nodes. The infection can then spread throughout the body. This causes serious illness.  Cellulitis causes the affected skin to become red, swollen, warm, and sore. The reddened areas have a visible border. An open sore may leak fluid (pus). You may have a fever, chills, and pain.  Cellulitis is treated with antibiotics taken for 7 to 10 days. An open sore may be cleaned and covered with cool wet gauze. Symptoms should get better 1 to 2 days after treatment is started. Make sure to take all the antibiotics for the full number of days until they are gone. Keep taking the medicine even if your symptoms go away.  Home care  Follow these tips:  · Limit the use of the part of your body with cellulitis.   · If the infection is on your leg, keep your leg raised while sitting. This will help to reduce swelling.  · Take all of the antibiotic medicine exactly as directed until it is gone. Do not miss any doses, especially during the first 7 days. Dont stop taking the medicine when your symptoms get better.  · Keep the affected area clean and dry.  · Wash your hands with soap and warm water before and after touching your skin. Anyone else who touches your skin should also wash his or her hands. Don't share towels.  Follow-up care  Follow up with your healthcare provider, or as advised. If your infection does not go away on the first antibiotic, your healthcare provider will prescribe a different one.  When to seek  medical advice  Call your healthcare provider right away if any of these occur:  · Red areas that spread  · Swelling or pain that gets worse  · Fluid leaking from the skin (pus)  · Fever higher of 100.4º F (38.0º C) or higher after 2 days on antibiotics  Date Last Reviewed: 9/1/2016  © 9508-3001 [employer]. [address] [place] [address]. All rights reserved. This information is not intended as a substitute for professional medical care. Always follow your healthcare professional's instructions.

## 2018-08-06 ENCOUNTER — OFFICE VISIT (OUTPATIENT)
Dept: RHEUMATOLOGY | Facility: CLINIC | Age: 53
End: 2018-08-06
Payer: COMMERCIAL

## 2018-08-06 ENCOUNTER — OFFICE VISIT (OUTPATIENT)
Dept: HEMATOLOGY/ONCOLOGY | Facility: CLINIC | Age: 53
End: 2018-08-06
Payer: COMMERCIAL

## 2018-08-06 VITALS
OXYGEN SATURATION: 100 % | RESPIRATION RATE: 16 BRPM | SYSTOLIC BLOOD PRESSURE: 222 MMHG | HEIGHT: 65 IN | TEMPERATURE: 98 F | WEIGHT: 171.5 LBS | DIASTOLIC BLOOD PRESSURE: 113 MMHG | HEART RATE: 65 BPM | BODY MASS INDEX: 28.57 KG/M2

## 2018-08-06 VITALS
HEIGHT: 65 IN | BODY MASS INDEX: 29.16 KG/M2 | HEART RATE: 67 BPM | DIASTOLIC BLOOD PRESSURE: 97 MMHG | SYSTOLIC BLOOD PRESSURE: 200 MMHG | WEIGHT: 175 LBS

## 2018-08-06 DIAGNOSIS — M32.19 OTHER SYSTEMIC LUPUS ERYTHEMATOSUS WITH OTHER ORGAN INVOLVEMENT: Primary | ICD-10-CM

## 2018-08-06 DIAGNOSIS — D59.19 OTHER AUTOIMMUNE HEMOLYTIC ANEMIAS: ICD-10-CM

## 2018-08-06 DIAGNOSIS — M32.14 SYSTEMIC LUPUS ERYTHEMATOSUS WITH GLOMERULAR DISEASE, UNSPECIFIED SLE TYPE: Primary | ICD-10-CM

## 2018-08-06 DIAGNOSIS — N17.8 ACUTE RENAL FAILURE WITH SPECIFIED LESION: ICD-10-CM

## 2018-08-06 DIAGNOSIS — R03.0 ELEVATED BLOOD PRESSURE READING: ICD-10-CM

## 2018-08-06 DIAGNOSIS — R80.9 PROTEINURIA, UNSPECIFIED TYPE: ICD-10-CM

## 2018-08-06 DIAGNOSIS — D59.10 AUTOIMMUNE HEMOLYTIC ANEMIA: ICD-10-CM

## 2018-08-06 DIAGNOSIS — M32.14 LUPUS NEPHRITIS: ICD-10-CM

## 2018-08-06 LAB — PATHOLOGIST INTERPRETATION AB/XM: NORMAL

## 2018-08-06 PROCEDURE — 3008F BODY MASS INDEX DOCD: CPT | Mod: CPTII,S$GLB,, | Performed by: INTERNAL MEDICINE

## 2018-08-06 PROCEDURE — 99999 PR PBB SHADOW E&M-EST. PATIENT-LVL III: CPT | Mod: PBBFAC,,, | Performed by: INTERNAL MEDICINE

## 2018-08-06 PROCEDURE — 99215 OFFICE O/P EST HI 40 MIN: CPT | Mod: S$GLB,,, | Performed by: INTERNAL MEDICINE

## 2018-08-06 PROCEDURE — 99214 OFFICE O/P EST MOD 30 MIN: CPT | Mod: S$GLB,,, | Performed by: INTERNAL MEDICINE

## 2018-08-06 PROCEDURE — 99999 PR PBB SHADOW E&M-EST. PATIENT-LVL III: CPT | Mod: PBBFAC,,,

## 2018-08-06 RX ORDER — PANTOPRAZOLE SODIUM 40 MG/1
40 TABLET, DELAYED RELEASE ORAL DAILY
Qty: 30 TABLET | Refills: 3 | Status: SHIPPED | OUTPATIENT
Start: 2018-08-06 | End: 2021-10-19 | Stop reason: SDUPTHER

## 2018-08-06 RX ORDER — PREDNISONE 20 MG/1
60 TABLET ORAL DAILY
Qty: 90 TABLET | Refills: 3 | Status: SHIPPED | OUTPATIENT
Start: 2018-08-06 | End: 2018-09-05

## 2018-08-06 RX ORDER — MYCOPHENOLATE MOFETIL 500 MG/1
500 TABLET ORAL DAILY
Qty: 30 TABLET | Refills: 3 | Status: SHIPPED | OUTPATIENT
Start: 2018-08-06 | End: 2018-09-06

## 2018-08-06 RX ORDER — HYDROXYCHLOROQUINE SULFATE 200 MG/1
200 TABLET, FILM COATED ORAL DAILY
Qty: 30 TABLET | Refills: 3 | Status: SHIPPED | OUTPATIENT
Start: 2018-08-06 | End: 2018-09-05

## 2018-08-06 RX ORDER — LOSARTAN POTASSIUM 25 MG/1
25 TABLET ORAL DAILY
Qty: 30 TABLET | Refills: 3 | Status: SHIPPED | OUTPATIENT
Start: 2018-08-06 | End: 2018-09-05

## 2018-08-06 RX ORDER — SULFAMETHOXAZOLE AND TRIMETHOPRIM 800; 160 MG/1; MG/1
TABLET ORAL
Qty: 12 TABLET | Refills: 3 | Status: SHIPPED | OUTPATIENT
Start: 2018-08-06 | End: 2018-08-06

## 2018-08-06 ASSESSMENT — ROUTINE ASSESSMENT OF PATIENT INDEX DATA (RAPID3)
MDHAQ FUNCTION SCORE: 1.1
PAIN SCORE: 8
FATIGUE SCORE: 7
AM STIFFNESS SCORE: 1, YES
PATIENT GLOBAL ASSESSMENT SCORE: 7
TOTAL RAPID3 SCORE: 6.22

## 2018-08-06 ASSESSMENT — SYSTEMIC LUPUS ERYTHEMATOSUS DISEASE ACTIVITY INDEX (SLEDAI): TOTAL_SCORE: 4

## 2018-08-06 NOTE — PROGRESS NOTES
PATIENT: Talya Bates  MRN: [id number]  DATE: 2018      Diagnosis:   1. Other systemic lupus erythematosus with other organ involvement    2. Autoimmune hemolytic anemia        Chief Complaint: Hemolysis    Subjective:    Initial History: Ms. Bates is a 53 y.o. female who presents to clinic s/p recent hospitalization for lupus flare complicated by MARKEL and hemolytic anemia.  The patient was recently admitted to the hospital from - with complaint of SOB and found to have autoimmune hemolytic anemia with hemoglobin of 5.6g/dL on admission.  The patient had stated she was off of Cellcept for 2 weeks prior to admission.  During her admission the patient was started back on Cellcept and was placed on prednisone 60mg daily.  She was transfused 2 units of PRBC's with hemoglobin of 9.2g/dL on d/c.  The patient currently states she feels fatigued.  She denies SOB, CP, fever, chills, N/V, constipation, diarrhea.     Past Medical History:   Past Medical History:   Diagnosis Date    Acid reflux     Alopecia     Anemia     Anxiety     Arthritis     Blood transfusion     Chronic kidney disease     Deep vein thrombosis 2012    left leg; completed anticoagulation; seen by heme/onc    Dry eyes     Dry mouth     Lupus        Past Surgical HIstory:   Past Surgical History:   Procedure Laterality Date     SECTION      SKIN SURGERY      to frontal head    TUBAL LIGATION         Family History:   Family History   Problem Relation Age of Onset    Colon cancer Maternal Aunt     Colon cancer Maternal Grandfather     Rheum arthritis Mother     Heart disease Mother     Kidney disease Mother     Hypertension Sister     Diabetes Mellitus Sister     Kidney disease Sister     Diabetes Sister     Diabetes Mellitus Sister     Lupus Neg Hx     Psoriasis Neg Hx        Social History:  reports that she has never smoked. She has never used smokeless tobacco. She reports that she does not drink alcohol or  use drugs.    Allergies:  Review of patient's allergies indicates:   Allergen Reactions    Ciprofloxacin Hives    Avelox [moxifloxacin] Rash    Iodinated contrast- oral and iv dye Itching and Swelling    Reglan [metoclopramide hcl] Other (See Comments)     Nervous and paranoid    Amoxicillin Edema     Face and lips became swollen with rash.       Medications:  Current Outpatient Prescriptions   Medication Sig Dispense Refill    cephALEXin (KEFLEX) 500 MG capsule Take 1 capsule (500 mg total) by mouth every 12 (twelve) hours. for 10 days 20 capsule 0    folic acid (FOLVITE) 1 MG tablet Take 1 tablet (1 mg total) by mouth once daily. 30 tablet 11    lidocaine (LMX) 4 % cream Apply topically as needed. 15 g 0    mycophenolate (CELLCEPT) 500 mg Tab Take 1 tablet (500 mg total) by mouth every evening. 30 tablet 0    sodium bicarbonate 650 MG tablet Take 2 tablets (1,300 mg total) by mouth 2 (two) times daily. 120 tablet 1    hydroxychloroquine (PLAQUENIL) 200 mg tablet Take 1 tablet (200 mg total) by mouth once daily. 30 tablet 3    losartan (COZAAR) 25 MG tablet Take 1 tablet (25 mg total) by mouth once daily. 30 tablet 3    mycophenolate (CELLCEPT) 500 mg Tab Take 1 tablet (500 mg total) by mouth Daily. 30 tablet 3    pantoprazole (PROTONIX) 40 MG tablet Take 1 tablet (40 mg total) by mouth once daily. 30 tablet 3    predniSONE (DELTASONE) 20 MG tablet Take 3 tablets (60 mg total) by mouth once daily. 90 tablet 3     No current facility-administered medications for this visit.        Review of Systems   Constitutional: Positive for fatigue. Negative for chills and fever.   Respiratory: Negative for cough and shortness of breath.    Cardiovascular: Negative for chest pain and palpitations.   Gastrointestinal: Negative for abdominal pain, constipation, diarrhea, nausea and vomiting.   Skin: Negative for rash.   Neurological: Negative for headaches.       ECOG Performance Status: 1   Objective:      Vitals:  "  Vitals:    08/06/18 1354   BP: (!) 222/113   BP Location: Right arm   Patient Position: Sitting   BP Method: Large (Automatic)   Pulse: 65   Resp: 16   Temp: 98.2 °F (36.8 °C)   TempSrc: Oral   SpO2: 100%   Weight: 77.8 kg (171 lb 8.3 oz)   Height: 5' 5" (1.651 m)     BMI: Body mass index is 28.54 kg/m².    Physical Exam   Constitutional: She is oriented to person, place, and time. She appears well-developed and well-nourished. No distress.   HENT:   Head: Normocephalic and atraumatic.   Eyes: Right eye exhibits no discharge. Left eye exhibits no discharge. No scleral icterus.   Cardiovascular: Normal rate, regular rhythm, normal heart sounds and intact distal pulses.  Exam reveals no gallop and no friction rub.    No murmur heard.  Pulmonary/Chest: Effort normal and breath sounds normal. No respiratory distress. She has no wheezes. She has no rales. She exhibits no tenderness.   Abdominal: Soft. Bowel sounds are normal. She exhibits no distension and no mass. There is no tenderness. There is no rebound and no guarding.   Musculoskeletal: Normal range of motion. She exhibits no edema or tenderness.   Lymphadenopathy:        Head (right side): No submental and no submandibular adenopathy present.        Head (left side): No submental and no submandibular adenopathy present.     She has no cervical adenopathy.     She has no axillary adenopathy.        Right: No supraclavicular adenopathy present.        Left: No supraclavicular adenopathy present.   Neurological: She is alert and oriented to person, place, and time.   Skin: No rash noted. She is not diaphoretic. No erythema.   Psychiatric: She has a normal mood and affect. Her behavior is normal.       Laboratory Data:  No visits with results within 1 Week(s) from this visit.   Latest known visit with results is:   Admission on 07/19/2018, Discharged on 07/24/2018   No results displayed because visit has over 200 results.            Imaging: Reviewed    Assessment: " "      1. Other systemic lupus erythematosus with other organ involvement    2. Autoimmune hemolytic anemia           Plan:     Autoimmune hemolytic anemia - the patient has recently had autoimmune hemolytic anemia from active lupus  -During her recent hospital staty she was started restarted on cellcept and initiated on prednisone on 7/20/18  -The patient was to have labs performed CBC, CMP, haptoglobin, LDH, and reticulocyte count performed at an outside lab due to insurance.  -The patient states he labs were done and handed into her nephrologist.  -Will Contact Dr Sood   -If patient's hemoglobin is stable without signs of hemolysis, the patient could be tapered quickly off of prednisone over the course of a week from a hematology perspective.  -The CellCept that the patient was started on should control further hemolsis from her lupus.  -Will coordinate care with Nephrology and Rheumatology    HTN - the patient had very elevated BP while in clinic today  -The patient did not allow for a recheck of her BP stating "I have another appointment to got to."  -Her BP has been elevated in various recent clinic visits.  -The patient was warned about not having control over her BP    Yrn Yu MD PGY-IV  Hematology and Oncology  Pager:[phone]    Distress Screening Results: Psychosocial Distress screening score of Distress Score: 10 noted and reviewed. No intervention indicated.  "

## 2018-08-06 NOTE — PROGRESS NOTES
Chief Complaint   Patient presents with    Lupus       Patient with systemic lupus for a follow up  BRUCE positive 1: 1280 /SSA positive  Dsdna negative,SSB negative,smith negative  Low C3,C4  Negative cardiolipin/beta 2 glycoprotein  Negative RF,CCP    History of presenting illness    Talya Bates is a 53 y.o. female with systemic lupus erythematosus manifested by Class IV nephritis, history of hemolytic anemia,malar rash, a low white cell count, arthritis, photosensitivity, hair loss, dry eyes, dry mouth, swollen lymph glands, fatigue and possibly Raynaud's.   She has h/o positive HSV 1 and HSV 2    Right temporal artery pain and enlargement. Biopsy negative.   CTA of head and neck without vasculitis but small lymph nodes noted.    Last visit : 's note :    Hold the cellcept : she held it for 6 months   Plaquenil was resumed : but she never got the medication    For 6 months now : she is off all medications    Hospitalisation : 7/19 to 7/24 :    She was short of breath but no cough : She was found to be anemic : she got blood transfusion and she also got treated for hemorrhoids,but they were not bleeding.They finally thought she had hemolytic anemia : KULWINDER positive   She had normal complements and she had normal dsdna   Her hapto was < 10  Her LDH was 292  Her high sensitivity CRP was 6.48  Her ESR was > 150  She had a viral syndrome prior to the onset of the shortness of breath.  She had no rash,no ulcers,no patchy alopecia,no joint pains  Solumedrol 125 mg one dose   Prednisone 60 mg daily   Plaquenil 200 mg daily was resumed  Cellcept 500 mg daily was resumed     She now has leg swelling  She now has a itch due to prednisone    Nephrology :7/31  Not on dialysis   She used to see [facility] nephrology but now moving care to [facility]  Her baseline creatinine is 3,but recently she had creatinine of 5.5 : she had protein,RBCs,white cells,nitrites,bacteria : protein/creatinine ratio was 0.61 : they thought her  renal issue was lupus flare vs infection vs hemolysis   Her UPC the past year was 1 gm +  She had high inflammatory markers,normal complements  Finally with hydration and steroids : the day of d/c her creatinine was 3.6    Hematology : 8/6  She has one DVT in 2012 : not on anticoagulants  She has chronic anemia,low hapto < 10 retic > 150 and positive Zackary   On the day of d/c her Hb was 9.2/Hct was 26.7    She has no leukopenia recently  In dec 2017,when she was on cellcept,her white count had dropped to 2.98 from 6.35  When  held her cellcept it improved to 3.47     Never had dermatology evaluation  Has a rash on her body which itches now and then    Blood pressure : today very high  She has been off losartan for a while now    Past history : systemic lupus with hemolytic anemia,vit d deficiency,fatigue,E nodosum,lupus nephritis,CKD stage 4    Family history : none relevant    Social history : never a smoker or alcoholic      Review of Systems   Constitutional: Negative for activity change, appetite change, chills, diaphoresis, fatigue, fever and unexpected weight change.   HENT: Negative for congestion, dental problem, drooling, ear discharge, ear pain, facial swelling, hearing loss, mouth sores, nosebleeds, postnasal drip, rhinorrhea, sinus pain, sinus pressure, sneezing, sore throat, tinnitus, trouble swallowing and voice change.    Eyes: Negative for photophobia, pain, discharge, redness, itching and visual disturbance.   Respiratory: Negative for apnea, cough, choking, chest tightness, shortness of breath, wheezing and stridor.    Cardiovascular: Negative for chest pain, palpitations and leg swelling.   Gastrointestinal: Negative for abdominal distention, abdominal pain, anal bleeding, blood in stool, constipation, diarrhea, nausea, rectal pain and vomiting.   Endocrine: Negative for cold intolerance, heat intolerance, polydipsia, polyphagia and polyuria.   Genitourinary: Negative for decreased urine  volume, difficulty urinating, dysuria, enuresis, flank pain, frequency, genital sores, hematuria and urgency.   Musculoskeletal: Negative for arthralgias, back pain, gait problem, joint swelling, myalgias, neck pain and neck stiffness.   Skin: Negative for color change, pallor, rash and wound.   Allergic/Immunologic: Negative for environmental allergies, food allergies and immunocompromised state.   Neurological: Negative for dizziness, tremors, seizures, syncope, facial asymmetry, speech difficulty, weakness, light-headedness, numbness and headaches.   Hematological: Negative for adenopathy. Does not bruise/bleed easily.   Psychiatric/Behavioral: Negative for agitation, behavioral problems, confusion, decreased concentration, dysphoric mood, hallucinations, self-injury, sleep disturbance and suicidal ideas. The patient is not nervous/anxious and is not hyperactive.      Physical Exam     CAREY-28 tender joint count: 0  CAREY-28 swollen joint count: 0    Physical Exam   Constitutional: She is oriented to person, place, and time and well-developed, well-nourished, and in no distress. No distress.   HENT:   Head: Normocephalic.   Mouth/Throat: Oropharynx is clear and moist.   Eyes: Conjunctivae are normal. Pupils are equal, round, and reactive to light. Right eye exhibits no discharge. Left eye exhibits no discharge. No scleral icterus.   Neck: Normal range of motion. No thyromegaly present.   Cardiovascular: Normal rate, regular rhythm, normal heart sounds and intact distal pulses.    Pulmonary/Chest: Effort normal and breath sounds normal. No stridor.   Abdominal: Soft. Bowel sounds are normal.   Lymphadenopathy:     She has no cervical adenopathy.   Neurological: She is alert and oriented to person, place, and time.   Skin: Skin is warm. No rash noted. She is not diaphoretic.     Psychiatric: Affect and judgment normal.   Musculoskeletal: Normal range of motion.       Assessment     53 year old black female with  systemic lupus/class IV nephritis and prior hemolytic anemia,leukopenia,with symptoms of malar rash,photosensitivity,arthritis, hair loss, dry eyes, dry mouth, swollen lymph glands, fatigue and possibly Raynaud's.  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    She was last seen by  12/2017 and she lost to follow up    She was supposed to have stopped cellcept due to the leukopenia it caused when she started 1000 mg dosing Nov 2017,she did comply to the recommendations  I UNDERSTAND THAT SHE IS OFF CELLCEPT FOR 6 MONTHS NOT 2 WEEKS  WHITE COUNT HAD RECOVERED TO 6.16 : 6 MONTHS AFTER STOPPING CELLCEPT  She was supposed to have started plaquenil 200 mg bid and she didn't follow back with an eye exam  She has been off prednisone for a while now    She had uneventful 6 month period but recently went in with shortness of breath and was found to have severe anemia attributed to hemolysis due to systemic lupus    She has fatigue/fibromyalgia/mood symptoms    She was recently hospitalised with hb of 6.2 : this was due to hemolysis due to lupus  Day of d/c Hb was 9.2  She is on 60 mg prednisone  Cellcept 500 mg was resumed  Plaquenil 200 mg was resumed    We have no rpt labs   She had normal complements and dsdna in the hospital  Her proteinuria was better than before    No clinical evidence of systemic lupus otherwise       Plan:       She will do her entire lupus monitoring labs today  She will fax them to me    Monday we will talk : communicate with nephrology and hematology : taper her prednisone  Decide whether or not to keep the cellcept    Continue prednisone 60 mg  Continue plaquenil 200 mg  Continue cellcept 500 mg     Follow up with nephrology and hematology       1. Systemic lupus erythematosus with glomerular disease, unspecified SLE type    2. Other autoimmune hemolytic anemias    3. Proteinuria, unspecified type        Talya was seen today for lupus.    Diagnoses and all orders for this visit:    Systemic lupus  erythematosus with glomerular disease, unspecified SLE type  -     CBC auto differential; Future  -     Comprehensive metabolic panel; Future  -     Sedimentation rate; Future  -     C-reactive protein; Standing  -     Anti-DNA antibody, double-stranded; Standing  -     C3 complement; Standing  -     C4 complement; Standing  -     Protein / creatinine ratio, urine; Standing  -     Urinalysis; Future  -     CBC auto differential  -     Comprehensive metabolic panel  -     Sedimentation rate  -     Urinalysis    Other autoimmune hemolytic anemias  -     CBC auto differential; Future  -     Comprehensive metabolic panel; Future  -     Sedimentation rate; Future  -     C-reactive protein; Standing  -     Anti-DNA antibody, double-stranded; Standing  -     C3 complement; Standing  -     C4 complement; Standing  -     Protein / creatinine ratio, urine; Standing  -     Urinalysis; Future  -     CBC auto differential  -     Comprehensive metabolic panel  -     Sedimentation rate  -     Urinalysis    Proteinuria, unspecified type    Other orders  -     mycophenolate (CELLCEPT) 500 mg Tab; Take 1 tablet (500 mg total) by mouth Daily.  -     hydroxychloroquine (PLAQUENIL) 200 mg tablet; Take 1 tablet (200 mg total) by mouth once daily.  -     predniSONE (DELTASONE) 20 MG tablet; Take 3 tablets (60 mg total) by mouth once daily.  -     pantoprazole (PROTONIX) 40 MG tablet; Take 1 tablet (40 mg total) by mouth once daily.  -     Discontinue: sulfamethoxazole-trimethoprim 800-160mg (BACTRIM DS) 800-160 mg Tab; 1 tab three times a week  -     losartan (COZAAR) 25 MG tablet; Take 1 tablet (25 mg total) by mouth once daily.      In a month she will come back : will schedule dexa and offer vaccinations    Prednisone use : suggested calcium and vit d  Bactrim not offered due to the degree of renal dysfunction    Blood pressures high  Gave losartan 25 mg

## 2018-08-06 NOTE — PROGRESS NOTES
Patient is  is here today for evaluation of MARKEL/CKD .Baseline Cr; 3.6-4.0 mg/dl; bump in Cr to 4.7 now appears to be resolving  There is hx SLE complicated by sub-nephrotic proteinuria; managed with plaquenil;  prednisone and mycophenolate. Recent serologies have been negative. She denies gross hematuria; kidney stones; NSAIDs admits to dysuria with recent UTI.  Treatment ? She also has auto-immune hemolytic anemia. Her most recent  Renal lab is shown below. Today she has no new complaints  Last relevant lab results shown below:  Lab Results   Component Value Date    K 4.1 07/24/2018    CREATININE 3.6 (H) 07/24/2018    ESTGFRAFRICA 15.6 (A) 07/24/2018    EGFRNONAA 13.5 (A) 07/24/2018       Physical Exam   Chronically ill woman; no acute distress; oriented x 3  BP; 180/90    HEENT; Grossly Intact  CHEST; Clear P&A; no rales or rhonchi  HEART; RR; S1&S2 no murmur rub gallop  ABD; BS(+); non-tender; (-) CVAT  EXT; (-) Edema    Impression:  MARKEL/CKD  Etiology not clear; but labs are improving  SLE. Serologies negative  Hypertension Not well controlled; cautioned re sodium; current mgmt Losartan 25 mg daily; needs more aggressive mgmt      Plan:  Return Visit; 1 mo

## 2018-08-08 LAB
APPEARANCE UR: CLEAR
BACTERIA #/AREA URNS HPF: NORMAL /[HPF]
BASOPHILS # BLD AUTO: 0 X10E3/UL (ref 0–0.2)
BASOPHILS NFR BLD AUTO: 0 %
BILIRUB UR QL STRIP: NEGATIVE
C3 SERPL-MCNC: NORMAL MG/DL
COLOR UR: YELLOW
CREAT UR-MCNC: NORMAL MG/DL
CRP SERPL-MCNC: NORMAL MG/L
EOSINOPHIL # BLD AUTO: 0 X10E3/UL (ref 0–0.4)
EOSINOPHIL NFR BLD AUTO: 0 %
EPI CELLS #/AREA URNS HPF: NORMAL /HPF
ERYTHROCYTE [DISTWIDTH] IN BLOOD BY AUTOMATED COUNT: 19.6 % (ref 12.3–15.4)
ERYTHROCYTE [SEDIMENTATION RATE] IN BLOOD BY WESTERGREN METHOD: 2 MM/HR (ref 0–40)
GLUCOSE UR QL: NEGATIVE
HCT VFR BLD AUTO: 28.4 % (ref 34–46.6)
HGB BLD-MCNC: 9.5 G/DL (ref 11.1–15.9)
HGB UR QL STRIP: NEGATIVE
IMM GRANULOCYTES # BLD: 0 X10E3/UL (ref 0–0.1)
IMM GRANULOCYTES NFR BLD: 0 %
KETONES UR QL STRIP: NEGATIVE
LEUKOCYTE ESTERASE UR QL STRIP: NEGATIVE
LYMPHOCYTES # BLD AUTO: 0.5 X10E3/UL (ref 0.7–3.1)
LYMPHOCYTES NFR BLD AUTO: 5 %
MCH RBC QN AUTO: 30.7 PG (ref 26.6–33)
MCHC RBC AUTO-ENTMCNC: 33.5 G/DL (ref 31.5–35.7)
MCV RBC AUTO: 92 FL (ref 79–97)
MICRO URNS: ABNORMAL
MONOCYTES # BLD AUTO: 0.3 X10E3/UL (ref 0.1–0.9)
MONOCYTES NFR BLD AUTO: 2 %
MUCOUS THREADS URNS QL MICRO: PRESENT
NEUTROPHILS # BLD AUTO: 10.2 X10E3/UL (ref 1.4–7)
NEUTROPHILS NFR BLD AUTO: 93 %
NITRITE UR QL STRIP: NEGATIVE
PH UR STRIP: 6 [PH] (ref 5–7.5)
PLATELET # BLD AUTO: 216 X10E3/UL (ref 150–379)
PROT UR QL STRIP: ABNORMAL
PROT UR-MCNC: NORMAL G/DL
PROT/CREAT UR: NORMAL MG/G{CREAT}
RBC # BLD AUTO: 3.09 X10E6/UL (ref 3.77–5.28)
RBC #/AREA URNS HPF: NORMAL /HPF
SP GR UR: 1.02 (ref 1–1.03)
UROBILINOGEN UR STRIP-MCNC: 0.2 MG/DL (ref 0.2–1)
WBC # BLD AUTO: 11 X10E3/UL (ref 3.4–10.8)
WBC #/AREA URNS HPF: NORMAL /HPF

## 2018-08-14 ENCOUNTER — TELEPHONE (OUTPATIENT)
Dept: NEPHROLOGY | Facility: CLINIC | Age: 53
End: 2018-08-14

## 2018-08-14 ENCOUNTER — DOCUMENTATION ONLY (OUTPATIENT)
Dept: RHEUMATOLOGY | Facility: CLINIC | Age: 53
End: 2018-08-14

## 2018-08-14 NOTE — TELEPHONE ENCOUNTER
----- Message from Beverley Sims sent at 8/14/2018 12:09 PM CDT -----  .Test Results    Type of Test: Labs  Date of Test: 7/30  Communication Preference: [phone]  Additional Information: Asking if results are in

## 2018-08-14 NOTE — PROGRESS NOTES
Hb stable  ESR nml  Many labs are pending  We will drop the prednisone to 50 mg first  Then after we have all labs we will drop it further

## 2018-08-16 ENCOUNTER — DOCUMENTATION ONLY (OUTPATIENT)
Dept: RHEUMATOLOGY | Facility: CLINIC | Age: 53
End: 2018-08-16

## 2018-08-16 NOTE — PROGRESS NOTES
Labs     Normal ESR,CRP  Protein/creat 730  nml C3  White count 11  H/H 9.5/28.4  MCV 92        Will communicate with hematology and nephrology : see if we can drop the prednisone further from 50 mg down

## 2018-08-20 ENCOUNTER — DOCUMENTATION ONLY (OUTPATIENT)
Dept: RHEUMATOLOGY | Facility: CLINIC | Age: 53
End: 2018-08-20

## 2018-08-20 NOTE — PROGRESS NOTES
Hematology says drop prednisone 10 mg every month  50 mg for a month  40 mg for a month  30 mg for a month  20 mg for a month  10 mg for a month  Then stop

## 2018-08-21 ENCOUNTER — TELEPHONE (OUTPATIENT)
Dept: TRANSPLANT | Facility: HOSPITAL | Age: 53
End: 2018-08-21

## 2018-08-21 NOTE — TELEPHONE ENCOUNTER
I spoke to the patient and she is on 40mg of prednisone currently since 8/15/18.  I informed the patient that she could decrease her dose of prednisone by 10mg a week.  I told her she could decrease to 30mg daily tomorrow.  She could them drop to 20mg daily on 8/29/18, 10mg daily on 9/05/18 and then stop steroids on 9/12/18.  The patient expressed understanding.  All questions were answered to her satisfaction.    Yrn Yu MD  Hematology and Oncology Fellow PGY-VI  Pager:[phone]

## 2018-08-23 ENCOUNTER — TELEPHONE (OUTPATIENT)
Dept: NEPHROLOGY | Facility: CLINIC | Age: 53
End: 2018-08-23

## 2018-08-23 NOTE — ADDENDUM NOTE
Addended by: AMBREEN ALFARO on: 8/23/2018 03:02 PM     Modules accepted: Orders, Level of Service

## 2018-08-23 NOTE — TELEPHONE ENCOUNTER
Gave dr combs the message,  ----- Message from Rehana Dos Santos sent at 8/23/2018  1:17 PM CDT -----  Contact: Self [phone]   PT called for lab results.

## 2018-09-06 ENCOUNTER — OFFICE VISIT (OUTPATIENT)
Dept: RHEUMATOLOGY | Facility: CLINIC | Age: 53
End: 2018-09-06
Payer: COMMERCIAL

## 2018-09-06 ENCOUNTER — OFFICE VISIT (OUTPATIENT)
Dept: NEPHROLOGY | Facility: CLINIC | Age: 53
End: 2018-09-06
Payer: COMMERCIAL

## 2018-09-06 VITALS
DIASTOLIC BLOOD PRESSURE: 98 MMHG | BODY MASS INDEX: 27.56 KG/M2 | HEIGHT: 65 IN | WEIGHT: 165.38 LBS | SYSTOLIC BLOOD PRESSURE: 160 MMHG

## 2018-09-06 VITALS
HEIGHT: 65 IN | WEIGHT: 172 LBS | DIASTOLIC BLOOD PRESSURE: 89 MMHG | HEART RATE: 67 BPM | SYSTOLIC BLOOD PRESSURE: 189 MMHG | BODY MASS INDEX: 28.66 KG/M2

## 2018-09-06 DIAGNOSIS — N18.4 CKD (CHRONIC KIDNEY DISEASE), STAGE IV: ICD-10-CM

## 2018-09-06 DIAGNOSIS — M32.9 SYSTEMIC LUPUS ERYTHEMATOSUS, UNSPECIFIED SLE TYPE, UNSPECIFIED ORGAN INVOLVEMENT STATUS: ICD-10-CM

## 2018-09-06 DIAGNOSIS — M32.14 LUPUS NEPHRITIS: ICD-10-CM

## 2018-09-06 DIAGNOSIS — N17.9 AKI (ACUTE KIDNEY INJURY): Primary | ICD-10-CM

## 2018-09-06 DIAGNOSIS — M32.14 OTHER SYSTEMIC LUPUS ERYTHEMATOSUS WITH GLOMERULAR DISEASE: ICD-10-CM

## 2018-09-06 PROCEDURE — 3008F BODY MASS INDEX DOCD: CPT | Mod: CPTII,S$GLB,, | Performed by: INTERNAL MEDICINE

## 2018-09-06 PROCEDURE — 99999 PR PBB SHADOW E&M-EST. PATIENT-LVL III: CPT | Mod: PBBFAC,,, | Performed by: INTERNAL MEDICINE

## 2018-09-06 PROCEDURE — 99213 OFFICE O/P EST LOW 20 MIN: CPT | Mod: S$GLB,,, | Performed by: INTERNAL MEDICINE

## 2018-09-06 PROCEDURE — 99214 OFFICE O/P EST MOD 30 MIN: CPT | Mod: S$GLB,,, | Performed by: INTERNAL MEDICINE

## 2018-09-06 RX ORDER — LOSARTAN POTASSIUM 50 MG/1
50 TABLET ORAL DAILY
Qty: 90 TABLET | Refills: 3 | Status: SHIPPED | OUTPATIENT
Start: 2018-09-06 | End: 2019-09-06

## 2018-09-06 RX ORDER — HYDROXYCHLOROQUINE SULFATE 200 MG/1
200 TABLET, FILM COATED ORAL DAILY
Qty: 30 TABLET | Refills: 5 | Status: SHIPPED | OUTPATIENT
Start: 2018-09-06 | End: 2018-10-06

## 2018-09-06 RX ORDER — FUROSEMIDE 20 MG/1
20 TABLET ORAL 2 TIMES DAILY
Qty: 60 TABLET | Refills: 11 | Status: SHIPPED | OUTPATIENT
Start: 2018-09-06 | End: 2019-09-06

## 2018-09-06 RX ORDER — MYCOPHENOLATE MOFETIL 500 MG/1
500 TABLET ORAL 2 TIMES DAILY
Qty: 60 TABLET | Refills: 3 | Status: SHIPPED | OUTPATIENT
Start: 2018-09-06 | End: 2018-10-12

## 2018-09-06 RX ORDER — HYDROXYCHLOROQUINE SULFATE 200 MG/1
200 TABLET, FILM COATED ORAL 2 TIMES DAILY
Qty: 60 TABLET | Refills: 5 | Status: SHIPPED | OUTPATIENT
Start: 2018-09-06 | End: 2018-09-06

## 2018-09-06 ASSESSMENT — ROUTINE ASSESSMENT OF PATIENT INDEX DATA (RAPID3)
TOTAL RAPID3 SCORE: 5.89
AM STIFFNESS SCORE: 1, YES
MDHAQ FUNCTION SCORE: .8
PAIN SCORE: 7.5
FATIGUE SCORE: 7.5
PATIENT GLOBAL ASSESSMENT SCORE: 7.5

## 2018-09-06 ASSESSMENT — SYSTEMIC LUPUS ERYTHEMATOSUS DISEASE ACTIVITY INDEX (SLEDAI): TOTAL_SCORE: 4

## 2018-09-06 NOTE — PROGRESS NOTES
Chief Complaint   Patient presents with    Lupus       Patient with systemic lupus for a follow up    BRUCE positive 1: 1280 /SSA positive  Dsdna negative,SSB negative,smith negative  Low C3,C4  Negative cardiolipin/beta 2 glycoprotein  Negative RF,CCP      She brings with her the most recent labs    Normal ESR,CRP  Protein/creat 730>>1375  nml C3  White count 11  H/H 9.5/28.4  MCV 92  Urine protein 2+    History of presenting illness    Talya Bates is a 53 y.o. female with systemic lupus erythematosus manifested by Class IV nephritis, history of hemolytic anemia,malar rash, a low white cell count, arthritis, photosensitivity, hair loss, dry eyes, dry mouth, swollen lymph glands, fatigue and possibly Raynaud's.   She has h/o positive HSV 1 and HSV 2    Right temporal artery pain and enlargement. Biopsy negative.   CTA of head and neck without vasculitis but small lymph nodes noted.    Last visit : 's note :    Hold the cellcept : she held it for 6 months   Plaquenil was resumed : but she never got the medication    For 6 months now : she is off all medications    Hospitalisation : 7/19 to 7/24 :    She was short of breath but no cough : She was found to be anemic : she got blood transfusion and she also got treated for hemorrhoids,but they were not bleeding.They finally thought she had hemolytic anemia : KULWINDER positive   She had normal complements and she had normal dsdna   Her hapto was < 10  Her LDH was 292  Her high sensitivity CRP was 6.48  Her ESR was > 150  She had a viral syndrome prior to the onset of the shortness of breath.  She had no rash,no ulcers,no patchy alopecia,no joint pains  Solumedrol 125 mg one dose   Prednisone 60 mg daily   Plaquenil 200 mg daily was resumed  Cellcept 500 mg daily was resumed     Nephrology :  Not on dialysis   She used to see [facility] nephrology but now moving care to [facility]  Her baseline creatinine is 3,but recently she had creatinine of 5.5 : she had  protein,RBCs,white cells,nitrites,bacteria : protein/creatinine ratio was 0.61 : they thought her renal issue was lupus flare vs infection vs hemolysis   Her UPC the past year was 1 gm +  She had high inflammatory markers,normal complements  Finally with hydration and steroids : the day of d/c her creatinine was 3.6    Hematology : 8/6  She has one DVT in 2012 : not on anticoagulants  She has chronic anemia,low hapto < 10 retic > 150 and positive Zackary   On the day of d/c her Hb was 9.2/Hct was 26.7    She has no leukopenia recently  In dec 2017,when she was on cellcept,her white count had dropped to 2.98 from 6.35  When  held her cellcept it improved to 3.47     Never had dermatology evaluation  Has a rash on her body which itches now and then    Blood pressure : today very high  She has been off losartan for a while now    Past history : systemic lupus with hemolytic anemia,vit d deficiency,fatigue,E nodosum,lupus nephritis,CKD stage 4    Family history : none relevant    Social history : never a smoker or alcoholic      Review of Systems   Constitutional: Negative for activity change, appetite change, chills, diaphoresis, fatigue, fever and unexpected weight change.   HENT: Negative for congestion, dental problem, drooling, ear discharge, ear pain, facial swelling, hearing loss, mouth sores, nosebleeds, postnasal drip, rhinorrhea, sinus pressure, sinus pain, sneezing, sore throat, tinnitus, trouble swallowing and voice change.    Eyes: Negative for photophobia, pain, discharge, redness, itching and visual disturbance.   Respiratory: Negative for apnea, cough, choking, chest tightness, shortness of breath, wheezing and stridor.    Cardiovascular: Negative for chest pain, palpitations and leg swelling.   Gastrointestinal: Negative for abdominal distention, abdominal pain, anal bleeding, blood in stool, constipation, diarrhea, nausea, rectal pain and vomiting.   Endocrine: Negative for cold intolerance, heat  intolerance, polydipsia, polyphagia and polyuria.   Genitourinary: Negative for decreased urine volume, difficulty urinating, dysuria, enuresis, flank pain, frequency, genital sores, hematuria and urgency.   Musculoskeletal: Negative for arthralgias, back pain, gait problem, joint swelling, myalgias, neck pain and neck stiffness.   Skin: Negative for color change, pallor, rash and wound.   Allergic/Immunologic: Negative for environmental allergies, food allergies and immunocompromised state.   Neurological: Negative for dizziness, tremors, seizures, syncope, facial asymmetry, speech difficulty, weakness, light-headedness, numbness and headaches.   Hematological: Negative for adenopathy. Does not bruise/bleed easily.   Psychiatric/Behavioral: Negative for agitation, behavioral problems, confusion, decreased concentration, dysphoric mood, hallucinations, self-injury, sleep disturbance and suicidal ideas. The patient is not nervous/anxious and is not hyperactive.      Physical Exam     CAREY-28 tender joint count: 0  CAREY-28 swollen joint count: 0    Physical Exam   Constitutional: She is oriented to person, place, and time and well-developed, well-nourished, and in no distress. No distress.   HENT:   Head: Normocephalic.   Mouth/Throat: Oropharynx is clear and moist.   Eyes: Conjunctivae are normal. Pupils are equal, round, and reactive to light. Right eye exhibits no discharge. Left eye exhibits no discharge. No scleral icterus.   Neck: Normal range of motion. No thyromegaly present.   Cardiovascular: Normal rate, regular rhythm, normal heart sounds and intact distal pulses.    Pulmonary/Chest: Effort normal and breath sounds normal. No stridor.   Abdominal: Soft. Bowel sounds are normal.   Lymphadenopathy:     She has no cervical adenopathy.   Neurological: She is alert and oriented to person, place, and time.   Skin: Skin is warm. No rash noted. She is not diaphoretic.     Psychiatric: Affect and judgment normal.    Musculoskeletal: Normal range of motion.       Assessment     53 year old black female with systemic lupus/class IV nephritis and prior hemolytic anemia,leukopenia,with symptoms of malar rash,photosensitivity,arthritis, hair loss, dry eyes, dry mouth, swollen lymph glands, fatigue and possibly Raynaud's.  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    She was last seen by  12/2017 and she lost to follow up    She had uneventful 6 month period but recently went in with shortness of breath and was found to have severe anemia attributed to hemolysis due to systemic lupus    She has fatigue/fibromyalgia/mood symptoms    She was recently hospitalised with hb of 6.2 : this was due to hemolysis due to lupus  Day of d/c Hb was 9.2  She was on 60 mg prednisone  Cellcept 500 mg was resumed  Plaquenil 200 mg was resumed    We sent her to hematology : they are tapering the prednisone and she is down to 5 mg    Her last labs 8/2018  Normal C3  CMP didn't get done  Counts stabilised  Proteinuria is worsening    I am really concerned about the worsening proteinuria      Plan:       increase  Cellcept from 500 to 1000 mg   Plaquenil 200 mg to continue  Prednisone : she wants to keep it at 5 mg     Blood pressures very high today    She is on losartan    Ask nephrology what their thoughts are on her proteinuria    Follow up with nephrology and hematology     I will rpt the lupus monitoring labs in a month  I told her until I know the counts are stable and her proteinuria is not due to active lupus I will follow her closely      1. Systemic lupus erythematosus, unspecified SLE type, unspecified organ involvement status    2. Lupus nephritis        Talya was seen today for lupus.    Diagnoses and all orders for this visit:    Systemic lupus erythematosus, unspecified SLE type, unspecified organ involvement status  -     mycophenolate (CELLCEPT) 500 mg Tab; Take 1 tablet (500 mg total) by mouth 2 (two) times daily.  -     CBC auto  differential; Future  -     Comprehensive metabolic panel; Future  -     Sedimentation rate; Future  -     C-reactive protein; Standing  -     C4 complement; Standing  -     C3 complement; Standing  -     Complement, total; Future  -     Anti-DNA antibody, double-stranded; Standing  -     Protein / creatinine ratio, urine; Standing  -     Urinalysis; Future  -     CBC auto differential  -     Comprehensive metabolic panel  -     Sedimentation rate  -     Complement, total  -     Urinalysis    Lupus nephritis  -     mycophenolate (CELLCEPT) 500 mg Tab; Take 1 tablet (500 mg total) by mouth 2 (two) times daily.  -     CBC auto differential; Future  -     Comprehensive metabolic panel; Future  -     Sedimentation rate; Future  -     C-reactive protein; Standing  -     C4 complement; Standing  -     C3 complement; Standing  -     Complement, total; Future  -     Anti-DNA antibody, double-stranded; Standing  -     Protein / creatinine ratio, urine; Standing  -     Urinalysis; Future  -     CBC auto differential  -     Comprehensive metabolic panel  -     Sedimentation rate  -     Complement, total  -     Urinalysis    Other orders  -     Discontinue: hydroxychloroquine (PLAQUENIL) 200 mg tablet; Take 1 tablet (200 mg total) by mouth 2 (two) times daily.  -     hydroxychloroquine (PLAQUENIL) 200 mg tablet; Take 1 tablet (200 mg total) by mouth once daily.      In a month she will come back : will schedule dexa and offer vaccinations    Prednisone use : suggested calcium and vit d  Bactrim not offered due to the degree of renal dysfunction

## 2018-09-11 NOTE — PROGRESS NOTES
Patient is here today for evaluation of CKD;  (see note 7/31/18)    Lab Results   Component Value Date    K 4.1 07/24/2018    CREATININE 3.6 (H) 07/24/2018    ESTGFRAFRICA 15.6 (A) 07/24/2018    EGFRNONAA 13.5 (A) 07/24/2018       Physical Exam   Chronically ill woman; no acute distress; oriented x 3  BP; 160/98    HEENT; Grossly Intact  CHEST; Clear P&A; no rales or rhonchi  HEART; RR; S1&S2 no murmur rub gallop  ABD BS(+) non-tender; (-) CVAT  EXT; (+) trace ankle; pretibial edema  Impression:  1. MARKEL (acute kidney injury)    2. CKD (chronic kidney disease), stage IV    3. Other systemic lupus erythematosus with glomerular disease    Hypertension not well controlled;   Increase losartan to 50 mg daily  Start furosemide 20 mg twice daily    Plan  Return Visit; 6 weeks    Return to Clinic:  1 MO

## 2018-10-12 ENCOUNTER — OFFICE VISIT (OUTPATIENT)
Dept: RHEUMATOLOGY | Facility: CLINIC | Age: 53
End: 2018-10-12
Payer: COMMERCIAL

## 2018-10-12 VITALS
WEIGHT: 178.81 LBS | HEART RATE: 76 BPM | DIASTOLIC BLOOD PRESSURE: 77 MMHG | SYSTOLIC BLOOD PRESSURE: 127 MMHG | BODY MASS INDEX: 29.79 KG/M2 | HEIGHT: 65 IN

## 2018-10-12 DIAGNOSIS — M32.9 SYSTEMIC LUPUS ERYTHEMATOSUS, UNSPECIFIED SLE TYPE, UNSPECIFIED ORGAN INVOLVEMENT STATUS: ICD-10-CM

## 2018-10-12 DIAGNOSIS — M32.14 LUPUS NEPHRITIS: ICD-10-CM

## 2018-10-12 DIAGNOSIS — M25.532 LEFT WRIST PAIN: Primary | ICD-10-CM

## 2018-10-12 PROCEDURE — 99999 PR PBB SHADOW E&M-EST. PATIENT-LVL III: CPT | Mod: PBBFAC,,, | Performed by: INTERNAL MEDICINE

## 2018-10-12 PROCEDURE — 99214 OFFICE O/P EST MOD 30 MIN: CPT | Mod: S$GLB,,, | Performed by: INTERNAL MEDICINE

## 2018-10-12 PROCEDURE — 3008F BODY MASS INDEX DOCD: CPT | Mod: CPTII,S$GLB,, | Performed by: INTERNAL MEDICINE

## 2018-10-12 RX ORDER — HYDROXYCHLOROQUINE SULFATE 200 MG/1
TABLET, FILM COATED ORAL
Refills: 5 | COMMUNITY
Start: 2018-10-09 | End: 2019-06-21

## 2018-10-12 RX ORDER — PREDNISONE 5 MG/1
5 TABLET ORAL DAILY
Qty: 30 TABLET | Refills: 2 | Status: SHIPPED | OUTPATIENT
Start: 2018-10-12 | End: 2018-11-11

## 2018-10-12 RX ORDER — MYCOPHENOLATE MOFETIL 500 MG/1
500 TABLET ORAL 3 TIMES DAILY
Qty: 90 TABLET | Refills: 3 | Status: SHIPPED | OUTPATIENT
Start: 2018-10-12 | End: 2018-12-07

## 2018-10-12 ASSESSMENT — SYSTEMIC LUPUS ERYTHEMATOSUS DISEASE ACTIVITY INDEX (SLEDAI): TOTAL_SCORE: 9

## 2018-10-12 NOTE — PROGRESS NOTES
Chief Complaint   Patient presents with    Disease Management    Swelling     Left arm, swollen about 3-4 days       Patient with systemic lupus for a follow up    BRUCE positive 1: 1280 /SSA positive  Dsdna negative,SSB negative,smith negative  Low C3,C4  Negative cardiolipin/beta 2 glycoprotein  Negative RF,CCP    Her lab trend  9/2018  nml inflammatory markers  Proteinuria down to 500 mg/g  nml complements  White count 3.7  H/H 9.1/28.6  Plt count 236  GFR 14  Neg dsdna    She is on prednisone 5 mg,plaquenil 200 mg and cellcept 1000 mg    Today her only complaint is left wrist pain    History of presenting illness    Talya Bates is a 53 y.o. female with systemic lupus erythematosus manifested by Class IV nephritis, history of hemolytic anemia,malar rash, a low white cell count, arthritis, photosensitivity, hair loss, dry eyes, dry mouth, swollen lymph glands, fatigue and possibly Raynaud's.   She has h/o positive HSV 1 and HSV 2    Right temporal artery pain and enlargement. Biopsy negative.   CTA of head and neck without vasculitis but small lymph nodes noted.    Last visit : 's note :    Hold the cellcept : she held it for 6 months   Plaquenil was resumed : but she never got the medication    For 6 months now : she is off all medications    Hospitalisation : 7/19 to 7/24 :    She was short of breath but no cough : She was found to be anemic : she got blood transfusion and she also got treated for hemorrhoids,but they were not bleeding.They finally thought she had hemolytic anemia : KULWINDER positive   She had normal complements and she had normal dsdna   Her hapto was < 10  Her LDH was 292  Her high sensitivity CRP was 6.48  Her ESR was > 150  She had a viral syndrome prior to the onset of the shortness of breath.  She had no rash,no ulcers,no patchy alopecia,no joint pains  Solumedrol 125 mg one dose   Prednisone 60 mg daily   Plaquenil 200 mg daily was resumed  Cellcept 500 mg daily was resumed      Nephrology :  Not on dialysis   She used to see [facility] nephrology but now moving care to [facility]  Her baseline creatinine is 3,but recently she had creatinine of 5.5 : she had protein,RBCs,white cells,nitrites,bacteria : protein/creatinine ratio was 0.61 : they thought her renal issue was lupus flare vs infection vs hemolysis   Her UPC the past year was 1 gm +  She had high inflammatory markers,normal complements  Finally with hydration and steroids : the day of d/c her creatinine was 3.6    Hematology : 8/6  She has one DVT in 2012 : not on anticoagulants  She has chronic anemia,low hapto < 10 retic > 150 and positive Zackary   On the day of d/c her Hb was 9.2/Hct was 26.7    She has no leukopenia recently  In dec 2017,when she was on cellcept,her white count had dropped to 2.98 from 6.35  When  held her cellcept it improved to 3.47     Never had dermatology evaluation  Has a rash on her body which itches now and then    Blood pressure : today very high  She has been off losartan for a while now    Past history : systemic lupus with hemolytic anemia,vit d deficiency,fatigue,E nodosum,lupus nephritis,CKD stage 4    Family history : none relevant    Social history : never a smoker or alcoholic      Review of Systems   Constitutional: Negative for activity change, appetite change, chills, diaphoresis, fatigue, fever and unexpected weight change.   HENT: Negative for congestion, dental problem, drooling, ear discharge, ear pain, facial swelling, hearing loss, mouth sores, nosebleeds, postnasal drip, rhinorrhea, sinus pressure, sinus pain, sneezing, sore throat, tinnitus, trouble swallowing and voice change.    Eyes: Negative for photophobia, pain, discharge, redness, itching and visual disturbance.   Respiratory: Negative for apnea, cough, choking, chest tightness, shortness of breath, wheezing and stridor.    Cardiovascular: Negative for chest pain, palpitations and leg swelling.   Gastrointestinal: Negative  for abdominal distention, abdominal pain, anal bleeding, blood in stool, constipation, diarrhea, nausea, rectal pain and vomiting.   Endocrine: Negative for cold intolerance, heat intolerance, polydipsia, polyphagia and polyuria.   Genitourinary: Negative for decreased urine volume, difficulty urinating, dysuria, enuresis, flank pain, frequency, genital sores, hematuria and urgency.   Musculoskeletal: Negative for arthralgias, back pain, gait problem, joint swelling, myalgias, neck pain and neck stiffness.   Skin: Negative for color change, pallor, rash and wound.   Allergic/Immunologic: Negative for environmental allergies, food allergies and immunocompromised state.   Neurological: Negative for dizziness, tremors, seizures, syncope, facial asymmetry, speech difficulty, weakness, light-headedness, numbness and headaches.   Hematological: Negative for adenopathy. Does not bruise/bleed easily.   Psychiatric/Behavioral: Negative for agitation, behavioral problems, confusion, decreased concentration, dysphoric mood, hallucinations, self-injury, sleep disturbance and suicidal ideas. The patient is not nervous/anxious and is not hyperactive.      Physical Exam     Tenderness:   LUE: wrist    Swelling:   LUE: wrist    CAREY-28 tender joint count: 1  CAREY-28 swollen joint count: 1    Physical Exam   Constitutional: She is oriented to person, place, and time and well-developed, well-nourished, and in no distress. No distress.   HENT:   Head: Normocephalic.   Mouth/Throat: Oropharynx is clear and moist.   Eyes: Conjunctivae are normal. Pupils are equal, round, and reactive to light. Right eye exhibits no discharge. Left eye exhibits no discharge. No scleral icterus.   Neck: Normal range of motion. No thyromegaly present.   Cardiovascular: Normal rate, regular rhythm, normal heart sounds and intact distal pulses.    Pulmonary/Chest: Effort normal and breath sounds normal. No stridor.   Abdominal: Soft. Bowel sounds are normal.    Lymphadenopathy:     She has no cervical adenopathy.   Neurological: She is alert and oriented to person, place, and time.   Skin: Skin is warm. No rash noted. She is not diaphoretic.     Psychiatric: Affect and judgment normal.   Musculoskeletal: Normal range of motion.       Assessment     53 year old black female with systemic lupus/class IV nephritis and prior hemolytic anemia,leukopenia,with symptoms of malar rash,photosensitivity,arthritis, hair loss, dry eyes, dry mouth, swollen lymph glands, fatigue and possibly Raynaud's.  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    She was last seen by  12/2017 and she lost to follow up    She had uneventful 6 month period but recently went in with shortness of breath and was found to have severe anemia attributed to hemolysis due to systemic lupus    She has fatigue/fibromyalgia/mood symptoms    She was recently hospitalised with hb of 6.2 : this was due to hemolysis due to lupus  Day of d/c Hb was 9.2  She was on 60 mg prednisone  Cellcept 500 mg was resumed  Plaquenil 200 mg was resumed    We sent her to hematology : they tapered her prednisone and she is down to 5 mg  We titrated her cellcept to 1000 mg    Her last labs 9/2018  Normal C3/c4/dsdna  Counts stabilised,white count 3.7 and H/H 9.1/28.6  I see the trend in white count but 3.7 is not a bad number  Proteinuria improving    LOOKS LIKE SHE IS TOLERATING THE CELLCEPT NOW    She has left wrist pain and mild synovitis today    Plan:       increase  Cellcept to 1500 mg daily  Plaquenil 200 mg to continue  Prednisone : she wants to keep it at 5 mg     IF RPT COUNTS IN 2 MONTHS WORSEN WILL DOWNTITRATE THE CELLCEPT  OR ELSE WILL ATLEAST BRING IT TO 2 G DAILY    Follow up with nephrology and hematology     I will rpt the lupus monitoring labs in 2 Months    Offered left wrist cortisone injection,she refused  Offered icing and rest     1. Left wrist pain    2. Systemic lupus erythematosus, unspecified SLE type, unspecified  organ involvement status    3. Lupus nephritis        Talya was seen today for disease management and swelling.    Diagnoses and all orders for this visit:    Left wrist pain    Systemic lupus erythematosus, unspecified SLE type, unspecified organ involvement status  -     mycophenolate (CELLCEPT) 500 mg Tab; Take 1 tablet (500 mg total) by mouth 3 (three) times daily.  -     CBC auto differential; Future  -     Comprehensive metabolic panel; Future  -     Sedimentation rate; Future  -     C-reactive protein; Standing  -     C3 complement; Standing  -     C4 complement; Standing  -     Protein / creatinine ratio, urine; Standing  -     Anti-DNA antibody, double-stranded; Standing  -     Urinalysis; Future    Lupus nephritis  -     mycophenolate (CELLCEPT) 500 mg Tab; Take 1 tablet (500 mg total) by mouth 3 (three) times daily.    Other orders  -     predniSONE (DELTASONE) 5 MG tablet; Take 1 tablet (5 mg total) by mouth once daily.      In a month she will come back : will schedule dexa and offer vaccinations    Prednisone use : suggested calcium and vit d  Bactrim not offered due to the degree of renal dysfunction

## 2018-11-19 DIAGNOSIS — M32.9 SYSTEMIC LUPUS ERYTHEMATOSUS, UNSPECIFIED SLE TYPE, UNSPECIFIED ORGAN INVOLVEMENT STATUS: Primary | ICD-10-CM

## 2018-11-22 LAB
ALBUMIN SERPL-MCNC: 3.9 G/DL (ref 3.5–5.5)
ALBUMIN/GLOB SERPL: 1.4 {RATIO} (ref 1.2–2.2)
ALP SERPL-CCNC: 46 IU/L (ref 39–117)
ALT SERPL-CCNC: 13 IU/L (ref 0–32)
APPEARANCE UR: CLEAR
AST SERPL-CCNC: 19 IU/L (ref 0–40)
BACTERIA #/AREA URNS HPF: NORMAL /[HPF]
BASOPHILS # BLD AUTO: 0 X10E3/UL (ref 0–0.2)
BASOPHILS NFR BLD AUTO: 0 %
BILIRUB SERPL-MCNC: <0.2 MG/DL (ref 0–1.2)
BILIRUB UR QL STRIP: NEGATIVE
BUN SERPL-MCNC: 32 MG/DL (ref 6–24)
BUN/CREAT SERPL: 11 (ref 9–23)
C3 SERPL-MCNC: 98 MG/DL (ref 82–167)
C4 SERPL-MCNC: 32 MG/DL (ref 14–44)
CALCIUM SERPL-MCNC: 9.3 MG/DL (ref 8.7–10.2)
CHLORIDE SERPL-SCNC: 109 MMOL/L (ref 96–106)
CO2 SERPL-SCNC: 22 MMOL/L (ref 20–29)
COLOR UR: YELLOW
CREAT SERPL-MCNC: 2.96 MG/DL (ref 0.57–1)
DSDNA AB SER-ACNC: 1 IU/ML (ref 0–9)
EGFR IF AFRICAN AMERICAN: 20 ML/MIN/1.73
EOSINOPHIL # BLD AUTO: 0 X10E3/UL (ref 0–0.4)
EOSINOPHIL NFR BLD AUTO: 1 %
EPI CELLS #/AREA URNS HPF: NORMAL /HPF
ERYTHROCYTE [DISTWIDTH] IN BLOOD BY AUTOMATED COUNT: 13.2 % (ref 12.3–15.4)
ERYTHROCYTE [SEDIMENTATION RATE] IN BLOOD BY WESTERGREN METHOD: 8 MM/HR (ref 0–40)
EST. GFR  (NON AFRICAN AMERICAN): 17 ML/MIN/1.73
GLOBULIN SER CALC-MCNC: 2.8 G/DL (ref 1.5–4.5)
GLUCOSE SERPL-MCNC: 83 MG/DL (ref 65–99)
GLUCOSE UR QL: NEGATIVE
HCT VFR BLD AUTO: 34.4 % (ref 34–46.6)
HGB BLD-MCNC: 10.9 G/DL (ref 11.1–15.9)
HGB UR QL STRIP: NEGATIVE
IMM GRANULOCYTES # BLD: 0 X10E3/UL (ref 0–0.1)
IMM GRANULOCYTES NFR BLD: 0 %
KETONES UR QL STRIP: NEGATIVE
LEUKOCYTE ESTERASE UR QL STRIP: NEGATIVE
LYMPHOCYTES # BLD AUTO: 1.1 X10E3/UL (ref 0.7–3.1)
LYMPHOCYTES NFR BLD AUTO: 41 %
MCH RBC QN AUTO: 29 PG (ref 26.6–33)
MCHC RBC AUTO-ENTMCNC: 31.7 G/DL (ref 31.5–35.7)
MCV RBC AUTO: 92 FL (ref 79–97)
MICRO URNS: ABNORMAL
MONOCYTES # BLD AUTO: 0.4 X10E3/UL (ref 0.1–0.9)
MONOCYTES NFR BLD AUTO: 14 %
NEUTROPHILS # BLD AUTO: 1.2 X10E3/UL (ref 1.4–7)
NEUTROPHILS NFR BLD AUTO: 44 %
NITRITE UR QL STRIP: NEGATIVE
PH UR STRIP: 6.5 [PH] (ref 5–7.5)
PLATELET # BLD AUTO: 192 X10E3/UL (ref 150–379)
POTASSIUM SERPL-SCNC: 5.2 MMOL/L (ref 3.5–5.2)
PROT SERPL-MCNC: 6.7 G/DL (ref 6–8.5)
PROT UR QL STRIP: ABNORMAL
RBC # BLD AUTO: 3.76 X10E6/UL (ref 3.77–5.28)
RBC #/AREA URNS HPF: NORMAL /HPF
SODIUM SERPL-SCNC: 144 MMOL/L (ref 134–144)
SP GR UR: 1.01 (ref 1–1.03)
UROBILINOGEN UR STRIP-MCNC: 0.2 MG/DL (ref 0.2–1)
WBC # BLD AUTO: 2.8 X10E3/UL (ref 3.4–10.8)
WBC #/AREA URNS HPF: NORMAL /HPF

## 2018-12-07 ENCOUNTER — OFFICE VISIT (OUTPATIENT)
Dept: RHEUMATOLOGY | Facility: CLINIC | Age: 53
End: 2018-12-07
Payer: COMMERCIAL

## 2018-12-07 VITALS
HEIGHT: 64 IN | HEART RATE: 66 BPM | WEIGHT: 183 LBS | SYSTOLIC BLOOD PRESSURE: 137 MMHG | DIASTOLIC BLOOD PRESSURE: 84 MMHG | BODY MASS INDEX: 31.24 KG/M2

## 2018-12-07 DIAGNOSIS — M32.9 SYSTEMIC LUPUS ERYTHEMATOSUS, UNSPECIFIED SLE TYPE, UNSPECIFIED ORGAN INVOLVEMENT STATUS: Primary | ICD-10-CM

## 2018-12-07 PROCEDURE — 99214 OFFICE O/P EST MOD 30 MIN: CPT | Mod: S$GLB,,, | Performed by: INTERNAL MEDICINE

## 2018-12-07 PROCEDURE — 3008F BODY MASS INDEX DOCD: CPT | Mod: CPTII,S$GLB,, | Performed by: INTERNAL MEDICINE

## 2018-12-07 PROCEDURE — 99999 PR PBB SHADOW E&M-EST. PATIENT-LVL III: CPT | Mod: PBBFAC,,, | Performed by: INTERNAL MEDICINE

## 2018-12-07 ASSESSMENT — SYSTEMIC LUPUS ERYTHEMATOSUS DISEASE ACTIVITY INDEX (SLEDAI): TOTAL_SCORE: 0

## 2018-12-07 NOTE — PROGRESS NOTES
Chief Complaint   Patient presents with    Follow-up     systemic lupus       Patient with systemic lupus for a follow up    BRUCE positive 1: 1280 /SSA positive  Dsdna negative,SSB negative,smith negative  Low C3,C4  Negative cardiolipin/beta 2 glycoprotein  Negative RF,CCP    Her lab trend    9/2018  nml inflammatory markers  Proteinuria down to 500 mg/g  nml complements  White count 3.7  H/H 9.1/28.6  Plt count 236  GFR 14  Neg dsdna    11/2018  White count 2.8  H/H 10.9/34.4  nml plts  Normal ESR  Urine protein 1+  They didn't do urine spot protein/creat  nml complements and dsdna   GFR at 20    She is on prednisone 5 mg,plaquenil 200 mg and cellcept 2000 mg    Fatigue is the only complaint today    History of presenting illness    Talya Bates is a 53 y.o. female with systemic lupus erythematosus manifested by Class IV nephritis, history of hemolytic anemia,malar rash, a low white cell count, arthritis, photosensitivity, hair loss, dry eyes, dry mouth, swollen lymph glands, fatigue and possibly Raynaud's.   She has h/o positive HSV 1 and HSV 2    Right temporal artery pain and enlargement. Biopsy negative.   CTA of head and neck without vasculitis but small lymph nodes noted.    Last visit : 's note :    Hold the cellcept : she held it for 6 months   Plaquenil was resumed : but she never got the medication    For 6 months now : she is off all medications    Hospitalisation : 7/19 to 7/24 :    She was short of breath but no cough : She was found to be anemic : she got blood transfusion and she also got treated for hemorrhoids,but they were not bleeding.They finally thought she had hemolytic anemia : KULWINDER positive   She had normal complements and she had normal dsdna   Her hapto was < 10  Her LDH was 292  Her high sensitivity CRP was 6.48  Her ESR was > 150  She had a viral syndrome prior to the onset of the shortness of breath.  She had no rash,no ulcers,no patchy alopecia,no joint pains  Solumedrol  125 mg one dose   Prednisone 60 mg daily   Plaquenil 200 mg daily was resumed  Cellcept 500 mg daily was resumed     Nephrology :  Not on dialysis   She used to see [facility] nephrology but now moving care to [facility]  Her baseline creatinine is 3,but recently she had creatinine of 5.5 : she had protein,RBCs,white cells,nitrites,bacteria : protein/creatinine ratio was 0.61 : they thought her renal issue was lupus flare vs infection vs hemolysis   Her UPC the past year was 1 gm +  She had high inflammatory markers,normal complements  Finally with hydration and steroids : the day of d/c her creatinine was 3.6    Hematology : 8/6  She has one DVT in 2012 : not on anticoagulants  She has chronic anemia,low hapto < 10 retic > 150 and positive Zackary   On the day of d/c her Hb was 9.2/Hct was 26.7    She has no leukopenia recently  In dec 2017,when she was on cellcept,her white count had dropped to 2.98 from 6.35  When  held her cellcept it improved to 3.47     Never had dermatology evaluation  Has a rash on her body which itches now and then    Blood pressure : today very high  She has been off losartan for a while now    Past history : systemic lupus with hemolytic anemia,vit d deficiency,fatigue,E nodosum,lupus nephritis,CKD stage 4    Family history : none relevant    Social history : never a smoker or alcoholic      Review of Systems   Constitutional: Negative for activity change, appetite change, chills, diaphoresis, fatigue, fever and unexpected weight change.   HENT: Negative for congestion, dental problem, drooling, ear discharge, ear pain, facial swelling, hearing loss, mouth sores, nosebleeds, postnasal drip, rhinorrhea, sinus pressure, sinus pain, sneezing, sore throat, tinnitus, trouble swallowing and voice change.    Eyes: Negative for photophobia, pain, discharge, redness, itching and visual disturbance.   Respiratory: Negative for apnea, cough, choking, chest tightness, shortness of breath, wheezing and  stridor.    Cardiovascular: Negative for chest pain, palpitations and leg swelling.   Gastrointestinal: Negative for abdominal distention, abdominal pain, anal bleeding, blood in stool, constipation, diarrhea, nausea, rectal pain and vomiting.   Endocrine: Negative for cold intolerance, heat intolerance, polydipsia, polyphagia and polyuria.   Genitourinary: Negative for decreased urine volume, difficulty urinating, dysuria, enuresis, flank pain, frequency, genital sores, hematuria and urgency.   Musculoskeletal: Negative for arthralgias, back pain, gait problem, joint swelling, myalgias, neck pain and neck stiffness.   Skin: Negative for color change, pallor, rash and wound.   Allergic/Immunologic: Negative for environmental allergies, food allergies and immunocompromised state.   Neurological: Negative for dizziness, tremors, seizures, syncope, facial asymmetry, speech difficulty, weakness, light-headedness, numbness and headaches.   Hematological: Negative for adenopathy. Does not bruise/bleed easily.   Psychiatric/Behavioral: Negative for agitation, behavioral problems, confusion, decreased concentration, dysphoric mood, hallucinations, self-injury, sleep disturbance and suicidal ideas. The patient is not nervous/anxious and is not hyperactive.      Physical Exam     CAREY-28 tender joint count: 0  CAREY-28 swollen joint count: 0    Physical Exam   Constitutional: She is oriented to person, place, and time and well-developed, well-nourished, and in no distress. No distress.   HENT:   Head: Normocephalic.   Mouth/Throat: Oropharynx is clear and moist.   Eyes: Conjunctivae are normal. Pupils are equal, round, and reactive to light. Right eye exhibits no discharge. Left eye exhibits no discharge. No scleral icterus.   Neck: Normal range of motion. No thyromegaly present.   Cardiovascular: Normal rate, regular rhythm, normal heart sounds and intact distal pulses.    Pulmonary/Chest: Effort normal and breath sounds  normal. No stridor.   Abdominal: Soft. Bowel sounds are normal.   Lymphadenopathy:     She has no cervical adenopathy.   Neurological: She is alert and oriented to person, place, and time.   Skin: Skin is warm. No rash noted. She is not diaphoretic.     Psychiatric: Affect and judgment normal.   Musculoskeletal: Normal range of motion.       Assessment     53 year old black female with systemic lupus/class IV nephritis and prior hemolytic anemia,leukopenia,with symptoms of malar rash,photosensitivity,arthritis, hair loss, dry eyes, dry mouth, swollen lymph glands, fatigue and possibly Raynaud's.  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    She was last seen by  12/2017 and she lost to follow up    She had uneventful 6 month period but recently went in with shortness of breath and was found to have severe anemia attributed to hemolysis due to systemic lupus    She has fatigue/fibromyalgia/mood symptoms    She was recently hospitalised with hb of 6.2 : this was due to hemolysis due to lupus  Day of d/c Hb was 9.2  She was on 60 mg prednisone  Cellcept 500 mg was resumed  Plaquenil 200 mg was resumed    We sent her to hematology : they tapered her prednisone and she is down to 5 mg  We titrated her cellcept to 2000 mg    Her white count has started to drop again  I am afraid it is from the cellcept     11 k/mcl in august to 3.7 in September to 2.8 in October     Fatigue is the only complaint     Plan:       Reduce cellcept to 1500 mg daily  Plaquenil 200 mg to continue  Prednisone : she wants to keep it at 5 mg     Recheck counts in 2 months     Follow up with nephrology and hematology     I will rpt the lupus monitoring labs in 2 Months    Unfortunately   Urine protein/creatinine didn't get done today    1. Systemic lupus erythematosus, unspecified SLE type, unspecified organ involvement status        Talya was seen today for follow-up.    Diagnoses and all orders for this visit:    Systemic lupus erythematosus, unspecified  SLE type, unspecified organ involvement status  -     DXA Bone Density Spine And Hip; Future  -     CBC auto differential; Future  -     Comprehensive metabolic panel; Future  -     Sedimentation rate; Future  -     C-reactive protein; Future  -     C3 complement; Future  -     C4 complement; Future  -     Protein / creatinine ratio, urine; Future  -     Urinalysis; Future  -     Anti-DNA antibody, double-stranded; Future        dexa offered     Prednisone use : suggested calcium and vit d    She says she is UTD with flu and pneumonia vaccinations

## 2019-02-09 LAB
APPEARANCE UR: CLEAR
BILIRUB UR QL STRIP: NEGATIVE
COLOR UR: YELLOW
CREAT UR-MCNC: 45.3 MG/DL
GLUCOSE UR QL: NEGATIVE
HGB UR QL STRIP: NEGATIVE
KETONES UR QL STRIP: NEGATIVE
LEUKOCYTE ESTERASE UR QL STRIP: NEGATIVE
MICRO URNS: NORMAL
NITRITE UR QL STRIP: NEGATIVE
PH UR STRIP: 6 [PH] (ref 5–7.5)
PROT UR QL STRIP: NORMAL
PROT UR-MCNC: 25.7 MG/DL
PROT/CREAT UR: 567 MG/G CREAT (ref 0–200)
SP GR UR: 1.01 (ref 1–1.03)
UROBILINOGEN UR STRIP-MCNC: 0.2 MG/DL (ref 0.2–1)

## 2019-02-11 LAB
ALBUMIN SERPL-MCNC: 4.5 G/DL (ref 3.5–5.5)
ALBUMIN/GLOB SERPL: 1.6 {RATIO} (ref 1.2–2.2)
ALP SERPL-CCNC: 58 IU/L (ref 39–117)
ALT SERPL-CCNC: 8 IU/L (ref 0–32)
AST SERPL-CCNC: 21 IU/L (ref 0–40)
BASOPHILS # BLD AUTO: 0 X10E3/UL (ref 0–0.2)
BASOPHILS NFR BLD AUTO: 0 %
BILIRUB SERPL-MCNC: 0.3 MG/DL (ref 0–1.2)
BUN SERPL-MCNC: 49 MG/DL (ref 6–24)
BUN/CREAT SERPL: 16 (ref 9–23)
C3 SERPL-MCNC: 103 MG/DL (ref 82–167)
C4 SERPL-MCNC: 34 MG/DL (ref 14–44)
CALCIUM SERPL-MCNC: 9.2 MG/DL (ref 8.7–10.2)
CHLORIDE SERPL-SCNC: 109 MMOL/L (ref 96–106)
CHOLEST SERPL-MCNC: 222 MG/DL (ref 100–199)
CO2 SERPL-SCNC: 18 MMOL/L (ref 20–29)
CREAT SERPL-MCNC: 3.09 MG/DL (ref 0.57–1)
CRP SERPL-MCNC: 0.9 MG/L (ref 0–4.9)
DSDNA AB SER-ACNC: 1 IU/ML (ref 0–9)
EOSINOPHIL # BLD AUTO: 0.1 X10E3/UL (ref 0–0.4)
EOSINOPHIL NFR BLD AUTO: 2 %
ERYTHROCYTE [DISTWIDTH] IN BLOOD BY AUTOMATED COUNT: 14.8 % (ref 12.3–15.4)
GLOBULIN SER CALC-MCNC: 2.9 G/DL (ref 1.5–4.5)
GLUCOSE SERPL-MCNC: 71 MG/DL (ref 65–99)
HCT VFR BLD AUTO: 33.4 % (ref 34–46.6)
HDLC SERPL-MCNC: 79 MG/DL
HGB BLD-MCNC: 10.7 G/DL (ref 11.1–15.9)
IMM GRANULOCYTES # BLD AUTO: 0 X10E3/UL (ref 0–0.1)
IMM GRANULOCYTES NFR BLD AUTO: 0 %
LDLC SERPL CALC-MCNC: 132 MG/DL (ref 0–99)
LYMPHOCYTES # BLD AUTO: 1.1 X10E3/UL (ref 0.7–3.1)
LYMPHOCYTES NFR BLD AUTO: 35 %
MCH RBC QN AUTO: 28.7 PG (ref 26.6–33)
MCHC RBC AUTO-ENTMCNC: 32 G/DL (ref 31.5–35.7)
MCV RBC AUTO: 90 FL (ref 79–97)
MONOCYTES # BLD AUTO: 0.6 X10E3/UL (ref 0.1–0.9)
MONOCYTES NFR BLD AUTO: 19 %
NEUTROPHILS # BLD AUTO: 1.4 X10E3/UL (ref 1.4–7)
NEUTROPHILS NFR BLD AUTO: 44 %
PLATELET # BLD AUTO: 221 X10E3/UL (ref 150–379)
POTASSIUM SERPL-SCNC: 4.9 MMOL/L (ref 3.5–5.2)
PROT SERPL-MCNC: 7.4 G/DL (ref 6–8.5)
RBC # BLD AUTO: 3.73 X10E6/UL (ref 3.77–5.28)
SODIUM SERPL-SCNC: 144 MMOL/L (ref 134–144)
TRIGL SERPL-MCNC: 56 MG/DL (ref 0–149)
VLDLC SERPL CALC-MCNC: 11 MG/DL (ref 5–40)
WBC # BLD AUTO: 3.2 X10E3/UL (ref 3.4–10.8)

## 2019-02-18 ENCOUNTER — OFFICE VISIT (OUTPATIENT)
Dept: INTERNAL MEDICINE | Facility: CLINIC | Age: 54
End: 2019-02-18
Payer: COMMERCIAL

## 2019-02-18 VITALS
SYSTOLIC BLOOD PRESSURE: 139 MMHG | WEIGHT: 198.44 LBS | HEIGHT: 65 IN | DIASTOLIC BLOOD PRESSURE: 78 MMHG | BODY MASS INDEX: 33.06 KG/M2 | OXYGEN SATURATION: 99 % | HEART RATE: 73 BPM

## 2019-02-18 DIAGNOSIS — Z12.11 ENCOUNTER FOR FIT (FECAL IMMUNOCHEMICAL TEST) SCREENING: ICD-10-CM

## 2019-02-18 DIAGNOSIS — I10 HYPERTENSION, UNSPECIFIED TYPE: ICD-10-CM

## 2019-02-18 DIAGNOSIS — Z01.419 WELL WOMAN EXAM: Primary | ICD-10-CM

## 2019-02-18 DIAGNOSIS — Z12.31 SCREENING MAMMOGRAM, ENCOUNTER FOR: ICD-10-CM

## 2019-02-18 PROCEDURE — 3075F SYST BP GE 130 - 139MM HG: CPT | Mod: CPTII,S$GLB,, | Performed by: INTERNAL MEDICINE

## 2019-02-18 PROCEDURE — 99999 PR PBB SHADOW E&M-EST. PATIENT-LVL IV: ICD-10-PCS | Mod: PBBFAC,,, | Performed by: INTERNAL MEDICINE

## 2019-02-18 PROCEDURE — 3008F PR BODY MASS INDEX (BMI) DOCUMENTED: ICD-10-PCS | Mod: CPTII,S$GLB,, | Performed by: INTERNAL MEDICINE

## 2019-02-18 PROCEDURE — 3078F PR MOST RECENT DIASTOLIC BLOOD PRESSURE < 80 MM HG: ICD-10-PCS | Mod: CPTII,S$GLB,, | Performed by: INTERNAL MEDICINE

## 2019-02-18 PROCEDURE — 99203 PR OFFICE/OUTPT VISIT, NEW, LEVL III, 30-44 MIN: ICD-10-PCS | Mod: S$GLB,,, | Performed by: INTERNAL MEDICINE

## 2019-02-18 PROCEDURE — 3008F BODY MASS INDEX DOCD: CPT | Mod: CPTII,S$GLB,, | Performed by: INTERNAL MEDICINE

## 2019-02-18 PROCEDURE — 99999 PR PBB SHADOW E&M-EST. PATIENT-LVL IV: CPT | Mod: PBBFAC,,, | Performed by: INTERNAL MEDICINE

## 2019-02-18 PROCEDURE — 99203 OFFICE O/P NEW LOW 30 MIN: CPT | Mod: S$GLB,,, | Performed by: INTERNAL MEDICINE

## 2019-02-18 PROCEDURE — 3075F PR MOST RECENT SYSTOLIC BLOOD PRESS GE 130-139MM HG: ICD-10-PCS | Mod: CPTII,S$GLB,, | Performed by: INTERNAL MEDICINE

## 2019-02-18 PROCEDURE — 3078F DIAST BP <80 MM HG: CPT | Mod: CPTII,S$GLB,, | Performed by: INTERNAL MEDICINE

## 2019-02-18 RX ORDER — AZITHROMYCIN 250 MG/1
TABLET, FILM COATED ORAL
Qty: 6 TABLET | Refills: 0 | Status: SHIPPED | OUTPATIENT
Start: 2019-02-18 | End: 2019-02-22

## 2019-02-22 ENCOUNTER — OFFICE VISIT (OUTPATIENT)
Dept: RHEUMATOLOGY | Facility: CLINIC | Age: 54
End: 2019-02-22
Payer: COMMERCIAL

## 2019-02-22 VITALS
BODY MASS INDEX: 33.35 KG/M2 | SYSTOLIC BLOOD PRESSURE: 143 MMHG | DIASTOLIC BLOOD PRESSURE: 87 MMHG | WEIGHT: 200.38 LBS | HEART RATE: 71 BPM

## 2019-02-22 DIAGNOSIS — M32.9 SYSTEMIC LUPUS ERYTHEMATOSUS, UNSPECIFIED SLE TYPE, UNSPECIFIED ORGAN INVOLVEMENT STATUS: Primary | ICD-10-CM

## 2019-02-22 PROCEDURE — 99214 PR OFFICE/OUTPT VISIT, EST, LEVL IV, 30-39 MIN: ICD-10-PCS | Mod: S$GLB,,, | Performed by: INTERNAL MEDICINE

## 2019-02-22 PROCEDURE — 99214 OFFICE O/P EST MOD 30 MIN: CPT | Mod: S$GLB,,, | Performed by: INTERNAL MEDICINE

## 2019-02-22 PROCEDURE — 3008F BODY MASS INDEX DOCD: CPT | Mod: CPTII,S$GLB,, | Performed by: INTERNAL MEDICINE

## 2019-02-22 PROCEDURE — 99999 PR PBB SHADOW E&M-EST. PATIENT-LVL III: CPT | Mod: PBBFAC,,, | Performed by: INTERNAL MEDICINE

## 2019-02-22 PROCEDURE — 99999 PR PBB SHADOW E&M-EST. PATIENT-LVL III: ICD-10-PCS | Mod: PBBFAC,,, | Performed by: INTERNAL MEDICINE

## 2019-02-22 PROCEDURE — 3008F PR BODY MASS INDEX (BMI) DOCUMENTED: ICD-10-PCS | Mod: CPTII,S$GLB,, | Performed by: INTERNAL MEDICINE

## 2019-02-22 RX ORDER — MYCOPHENOLATE MOFETIL 500 MG/1
TABLET ORAL
Refills: 3 | COMMUNITY
Start: 2019-02-09 | End: 2019-06-21

## 2019-02-22 ASSESSMENT — ROUTINE ASSESSMENT OF PATIENT INDEX DATA (RAPID3)
TOTAL RAPID3 SCORE: 5.83
PSYCHOLOGICAL DISTRESS SCORE: 3.3
AM STIFFNESS SCORE: 0, NO
MDHAQ FUNCTION SCORE: .6
FATIGUE SCORE: 7.5
PAIN SCORE: 7
PATIENT GLOBAL ASSESSMENT SCORE: 8.5

## 2019-02-22 ASSESSMENT — SYSTEMIC LUPUS ERYTHEMATOSUS DISEASE ACTIVITY INDEX (SLEDAI): TOTAL_SCORE: 0

## 2019-02-22 NOTE — PROGRESS NOTES
No chief complaint on file.      Patient with systemic lupus for a follow up    BRUCE positive 1: 1280 /SSA positive  Dsdna negative,SSB negative,smith negative  Low C3,C4  Negative cardiolipin/beta 2 glycoprotein  Negative RF,CCP    Her lab trend    9/2018  nml inflammatory markers  Proteinuria down to 500 mg/g  nml complements  White count 3.7  H/H 9.1/28.6  Plt count 236  GFR 14  Neg dsdna    11/2018  White count 2.8  H/H 10.9/34.4  nml plts  Normal ESR  Urine protein 1+  They didn't do urine spot protein/creat  nml complements and dsdna   GFR at 20    2/8/2019    Normal complements  Dsdna neg  CRP nml  GFR 19  White count 3.2  H/H 10.7/33.4  Urine protein/creat 567 mg      She was on prednisone 5 mg,plaquenil 200 mg and cellcept 1500 mg    She stopped plaquenil 200 mg       History of presenting illness    Talya Bates is a 53 y.o. female with systemic lupus erythematosus manifested by Class IV nephritis, history of hemolytic anemia,malar rash, a low white cell count, arthritis, photosensitivity, hair loss, dry eyes, dry mouth, swollen lymph glands, fatigue and possibly Raynaud's.   She has h/o positive HSV 1 and HSV 2    Right temporal artery pain and enlargement. Biopsy negative.   CTA of head and neck without vasculitis but small lymph nodes noted.    Last visit : 's note :    Hold the cellcept : she held it for 6 months   Plaquenil was resumed : but she never got the medication    For 6 months now : she is off all medications    Hospitalisation : 7/19 to 7/24 :    She was short of breath but no cough : She was found to be anemic : she got blood transfusion and she also got treated for hemorrhoids,but they were not bleeding.They finally thought she had hemolytic anemia : KULWINDER positive   She had normal complements and she had normal dsdna   Her hapto was < 10  Her LDH was 292  Her high sensitivity CRP was 6.48  Her ESR was > 150  She had a viral syndrome prior to the onset of the shortness of  breath.  She had no rash,no ulcers,no patchy alopecia,no joint pains  Solumedrol 125 mg one dose   Prednisone 60 mg daily   Plaquenil 200 mg daily was resumed  Cellcept 500 mg daily was resumed     Nephrology :  Not on dialysis   She used to see [facility] nephrology but now moving care to [facility]  Her baseline creatinine is 3,but recently she had creatinine of 5.5 : she had protein,RBCs,white cells,nitrites,bacteria : protein/creatinine ratio was 0.61 : they thought her renal issue was lupus flare vs infection vs hemolysis   Her UPC the past year was 1 gm +  She had high inflammatory markers,normal complements  Finally with hydration and steroids : the day of d/c her creatinine was 3.6    Hematology : 8/6  She has one DVT in 2012 : not on anticoagulants  She has chronic anemia,low hapto < 10 retic > 150 and positive Zackary   On the day of d/c her Hb was 9.2/Hct was 26.7    She has no leukopenia recently  In dec 2017,when she was on cellcept,her white count had dropped to 2.98 from 6.35  When  held her cellcept it improved to 3.47     Never had dermatology evaluation  Has a rash on her body which itches now and then    Blood pressure : today very high  She has been off losartan for a while now    Past history : systemic lupus with hemolytic anemia,vit d deficiency,fatigue,E nodosum,lupus nephritis,CKD stage 4    Family history : none relevant    Social history : never a smoker or alcoholic      Review of Systems   Constitutional: Negative for activity change, appetite change, chills, diaphoresis, fatigue, fever and unexpected weight change.   HENT: Negative for congestion, dental problem, drooling, ear discharge, ear pain, facial swelling, hearing loss, mouth sores, nosebleeds, postnasal drip, rhinorrhea, sinus pressure, sinus pain, sneezing, sore throat, tinnitus, trouble swallowing and voice change.    Eyes: Negative for photophobia, pain, discharge, redness, itching and visual disturbance.   Respiratory:  Negative for apnea, cough, choking, chest tightness, shortness of breath, wheezing and stridor.    Cardiovascular: Negative for chest pain, palpitations and leg swelling.   Gastrointestinal: Negative for abdominal distention, abdominal pain, anal bleeding, blood in stool, constipation, diarrhea, nausea, rectal pain and vomiting.   Endocrine: Negative for cold intolerance, heat intolerance, polydipsia, polyphagia and polyuria.   Genitourinary: Negative for decreased urine volume, difficulty urinating, dysuria, enuresis, flank pain, frequency, genital sores, hematuria and urgency.   Musculoskeletal: Negative for arthralgias, back pain, gait problem, joint swelling, myalgias, neck pain and neck stiffness.   Skin: Negative for color change, pallor, rash and wound.   Allergic/Immunologic: Negative for environmental allergies, food allergies and immunocompromised state.   Neurological: Negative for dizziness, tremors, seizures, syncope, facial asymmetry, speech difficulty, weakness, light-headedness, numbness and headaches.   Hematological: Negative for adenopathy. Does not bruise/bleed easily.   Psychiatric/Behavioral: Negative for agitation, behavioral problems, confusion, decreased concentration, dysphoric mood, hallucinations, self-injury, sleep disturbance and suicidal ideas. The patient is not nervous/anxious and is not hyperactive.      Physical Exam     CAREY-28 tender joint count: 0  CAREY-28 swollen joint count: 0    Physical Exam   Constitutional: She is oriented to person, place, and time and well-developed, well-nourished, and in no distress. No distress.   HENT:   Head: Normocephalic.   Mouth/Throat: Oropharynx is clear and moist.   Eyes: Conjunctivae are normal. Pupils are equal, round, and reactive to light. Right eye exhibits no discharge. Left eye exhibits no discharge. No scleral icterus.   Neck: Normal range of motion. No thyromegaly present.   Cardiovascular: Normal rate, regular rhythm, normal heart  sounds and intact distal pulses.    Pulmonary/Chest: Effort normal and breath sounds normal. No stridor.   Abdominal: Soft. Bowel sounds are normal.   Lymphadenopathy:     She has no cervical adenopathy.   Neurological: She is alert and oriented to person, place, and time.   Skin: Skin is warm. No rash noted. She is not diaphoretic.     Psychiatric: Affect and judgment normal.   Musculoskeletal: Normal range of motion.       Assessment     54 year old black female with systemic lupus/class IV nephritis and prior hemolytic anemia,leukopenia,with symptoms of malar rash,photosensitivity,arthritis, hair loss, dry eyes, dry mouth, swollen lymph glands, fatigue and possibly Raynaud's.  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    She was last seen by  12/2017 and she lost to follow up    She had uneventful 6 month period but recently went in with shortness of breath and was found to have severe anemia attributed to hemolysis due to systemic lupus    She has fatigue/fibromyalgia/mood symptoms    She was recently hospitalised with hb of 6.2 : this was due to hemolysis due to lupus  Day of d/c Hb was 9.2  She was on 60 mg prednisone  Cellcept 500 mg was resumed  Plaquenil 200 mg was resumed    We sent her to hematology : they tapered her prednisone and she is down to 5 mg  We titrated her cellcept to 2000 mg    Her white count dropped  So we tapered down the cellcept to 1500 mg     11 k/mcl in august to 3.7 in September to 2.8 in October and now 3.2    Fatigue is the only complaint     She stopped the plaquenil 2 weeks ago  I donot recommend that    Plan:       Continue cellcept at 1500 mg daily  Plaquenil 200 mg to continue  Prednisone : she wants to keep it at 5 mg     Recheck counts in 3 months     Follow up with nephrology and hematology   She needs a nephrology clinic evaluation    I will rpt the lupus monitoring labs in 3 Months      1. Systemic lupus erythematosus, unspecified SLE type, unspecified organ involvement status         Diagnoses and all orders for this visit:    Systemic lupus erythematosus, unspecified SLE type, unspecified organ involvement status  -     Ambulatory consult to Nephrology  -     CBC auto differential; Future  -     Comprehensive metabolic panel; Future  -     Sedimentation rate; Future  -     C-reactive protein; Future  -     Anti-DNA antibody, double-stranded; Future  -     C3 complement; Future  -     C4 complement; Future  -     Protein / creatinine ratio, urine; Future  -     Urinalysis; Future  -     CBC auto differential  -     Comprehensive metabolic panel  -     Sedimentation rate  -     C-reactive protein  -     Anti-DNA antibody, double-stranded  -     C3 complement  -     C4 complement  -     Urinalysis        dexa offered not done yet    Prednisone use : suggested calcium and vit d    She says she is UTD with flu and pneumonia vaccinations

## 2019-02-24 NOTE — PROGRESS NOTES
Subjective:       Patient ID: Talya Bates is a 54 y.o. female.    Chief Complaint: Establish Care    HPI  She is here for an initial visit.  She returns for management of hypertension.  She has had hypertension for over a year.  Current treatment has included medications outlined in medication list.  She denies chest pain or shortness of breath.  No palpitations.  Denies left arm or neck pain.    Past medical history:  Lupus, hypertension, anemia, chronic renal insufficiency, vitamin-D deficiency    Medications:  Lasix 20 mg b.i.d., Plaquenil, losartan 50 mg daily, CellCept    Allergies:  Cipro, iodine, penicillin, Reglan      Review of Systems   Constitutional: Negative for chills, fatigue, fever and unexpected weight change.   Respiratory: Negative for chest tightness and shortness of breath.    Cardiovascular: Negative for chest pain and palpitations.   Gastrointestinal: Negative for abdominal pain and blood in stool.   Neurological: Negative for dizziness, syncope, numbness and headaches.       Objective:      Physical Exam   HENT:   Right Ear: External ear normal.   Left Ear: External ear normal.   Nose: Nose normal.   Mouth/Throat: Oropharynx is clear and moist.   Eyes: Pupils are equal, round, and reactive to light.   Neck: Normal range of motion.   Cardiovascular: Normal rate and regular rhythm.   No murmur heard.  Pulmonary/Chest: Breath sounds normal.   Abdominal: She exhibits no distension. There is no hepatosplenomegaly. There is no tenderness.   Lymphadenopathy:     She has no cervical adenopathy.     She has no axillary adenopathy.   Neurological: She has normal strength and normal reflexes. No cranial nerve deficit or sensory deficit.     breast exam:  No masses palpated, no nipple discharge expressed  Assessment/Plan       Assessment and plan:  Hypertension:  Controlled.  Schedule mammogram.  She reports having her eye exam within the past year.  Discussed colonoscopy, she declined.  Fit kit  given

## 2019-04-15 DIAGNOSIS — M32.9 SYSTEMIC LUPUS ERYTHEMATOSUS, UNSPECIFIED SLE TYPE, UNSPECIFIED ORGAN INVOLVEMENT STATUS: Primary | ICD-10-CM

## 2019-04-20 ENCOUNTER — OFFICE VISIT (OUTPATIENT)
Dept: URGENT CARE | Facility: CLINIC | Age: 54
End: 2019-04-20
Payer: COMMERCIAL

## 2019-04-20 VITALS
HEART RATE: 59 BPM | SYSTOLIC BLOOD PRESSURE: 149 MMHG | RESPIRATION RATE: 19 BRPM | BODY MASS INDEX: 32.65 KG/M2 | TEMPERATURE: 98 F | WEIGHT: 196 LBS | HEIGHT: 65 IN | DIASTOLIC BLOOD PRESSURE: 87 MMHG | OXYGEN SATURATION: 98 %

## 2019-04-20 DIAGNOSIS — J02.9 SORE THROAT: ICD-10-CM

## 2019-04-20 DIAGNOSIS — H00.015 HORDEOLUM EXTERNUM OF LEFT LOWER EYELID: Primary | ICD-10-CM

## 2019-04-20 LAB
CTP QC/QA: YES
S PYO RRNA THROAT QL PROBE: NEGATIVE

## 2019-04-20 PROCEDURE — 3077F PR MOST RECENT SYSTOLIC BLOOD PRESSURE >= 140 MM HG: ICD-10-PCS | Mod: CPTII,S$GLB,, | Performed by: FAMILY MEDICINE

## 2019-04-20 PROCEDURE — 87880 STREP A ASSAY W/OPTIC: CPT | Mod: QW,S$GLB,, | Performed by: FAMILY MEDICINE

## 2019-04-20 PROCEDURE — 3008F BODY MASS INDEX DOCD: CPT | Mod: CPTII,S$GLB,, | Performed by: FAMILY MEDICINE

## 2019-04-20 PROCEDURE — 99214 OFFICE O/P EST MOD 30 MIN: CPT | Mod: S$GLB,,, | Performed by: FAMILY MEDICINE

## 2019-04-20 PROCEDURE — 87880 POCT RAPID STREP A: ICD-10-PCS | Mod: QW,S$GLB,, | Performed by: FAMILY MEDICINE

## 2019-04-20 PROCEDURE — 3079F PR MOST RECENT DIASTOLIC BLOOD PRESSURE 80-89 MM HG: ICD-10-PCS | Mod: CPTII,S$GLB,, | Performed by: FAMILY MEDICINE

## 2019-04-20 PROCEDURE — 3077F SYST BP >= 140 MM HG: CPT | Mod: CPTII,S$GLB,, | Performed by: FAMILY MEDICINE

## 2019-04-20 PROCEDURE — 99214 PR OFFICE/OUTPT VISIT, EST, LEVL IV, 30-39 MIN: ICD-10-PCS | Mod: S$GLB,,, | Performed by: FAMILY MEDICINE

## 2019-04-20 PROCEDURE — 3079F DIAST BP 80-89 MM HG: CPT | Mod: CPTII,S$GLB,, | Performed by: FAMILY MEDICINE

## 2019-04-20 PROCEDURE — 3008F PR BODY MASS INDEX (BMI) DOCUMENTED: ICD-10-PCS | Mod: CPTII,S$GLB,, | Performed by: FAMILY MEDICINE

## 2019-04-20 RX ORDER — DOXYCYCLINE 100 MG/1
100 CAPSULE ORAL 2 TIMES DAILY
Qty: 20 CAPSULE | Refills: 0 | Status: SHIPPED | OUTPATIENT
Start: 2019-04-20 | End: 2019-06-03

## 2019-04-20 NOTE — PATIENT INSTRUCTIONS
Sty (or Stye)  A sty is an infection of the oil gland of the eyelid. It may develop into a small pocket of pus (abscess). This can cause pain, redness, and swelling. In early stages, styes are treated with antibiotic cream, eye drops, or warm packs (small towels soaked in warm water). More severe cases may need to be opened and drained by a health care provider.  Home care  · Eye drops or ointment are usually prescribed to treat the infection. Use these as directed.   ¨ Artificial tears may also be used to lubricate the eye and make it more comfortable. These may be purchased without a prescription.   ¨ Talk to your health care provider before using any over-the-counter treatment for a sty.  · Apply a warm, damp towel to the affected eye for at least 5 minutes, 3 to 4 times a day for a week. Warm compresses open the pores and speed the healing. If the compresses are too hot, they may burn your eyelid.  · Sometimes the sty will drain with this treatment alone. If this happens, continue the antibiotic until all the redness and swelling are gone.  · Wash your hands before and after touching the infected eye to avoid spreading the infection.  · Do not squeeze or try to puncture the sty.  Follow-up care  Follow up with your health care provider, or as advised.   When to seek medical advice  Call your health care provider right away if you have:  · Increase in swelling or redness around the eyelid after 48 to 72 hours  · Increase in eye pain or the eyelid blisters  · Increase in warmth--the eyelid feels hot  · Drainage of blood or thick pus from the sty  · Blister on the eyelid  · Inability to open the eyelid due to swelling  · Fever  ¨ 1 degree above your normal temperature lasting for 24 to 48 hours, or  ¨ Whatever your health care provider told you to report based on your medical condition  · Vision changes  · Headache or stiff neck  · Recurrence of the sty  Date Last Reviewed: 6/14/2015  © 3328-3496 [employer]. [address] [place] [address]. All rights reserved. This information is not intended as a substitute for professional medical care. Always follow your healthcare professional's instructions.      Follow up with your doctor in a few days as needed.  Return to the urgent care or go to the ER if symptoms get worse.    Carlitos Chowdhury MD

## 2019-04-20 NOTE — PROGRESS NOTES
"Subjective:       Patient ID: Talya Bates is a 54 y.o. female.    Vitals:  height is 5' 5" (1.651 m) and weight is 88.9 kg (196 lb). Her oral temperature is 97.6 °F (36.4 °C). Her blood pressure is 149/87 (abnormal) and her pulse is 59 (abnormal). Her respiration is 19 and oxygen saturation is 98%.     Chief Complaint: Belepharitis (L eye ) and Sinusitis (nasal congestion, post-nasal drip, sinus pressure)    Eye Problem    The left eye is affected. This is a new problem. The current episode started in the past 7 days (3 days ago). The problem occurs constantly. The problem has been unchanged. There was no injury mechanism. The patient is experiencing no pain. There is no known exposure to pink eye. She does not wear contacts. Pertinent negatives include no blurred vision, eye discharge, double vision, eye redness, fever, itching, nausea, photophobia or vomiting. She has tried nothing for the symptoms.   Sinusitis   This is a new problem. The current episode started in the past 7 days (one week ago). The problem has been gradually worsening since onset. There has been no fever. She is experiencing no pain. Associated symptoms include congestion and sinus pressure. Pertinent negatives include no chills or headaches. Treatments tried: Mucinex. The treatment provided no relief.       Constitution: Negative for chills and fever.   HENT: Positive for congestion and sinus pressure. Negative for sinus pain.    Eyes: Positive for eyelid swelling. Negative for eye trauma, foreign body in eye, eye discharge, eye itching, eye pain, eye redness, photophobia, vision loss, double vision and blurred vision.   Gastrointestinal: Negative for nausea and vomiting.   Genitourinary: Negative for history of kidney stones.   Skin: Negative for rash and erythema.   Allergic/Immunologic: Negative for seasonal allergies and itching.   Neurological: Negative for headaches.       Objective:      Physical Exam   Constitutional: She is " oriented to person, place, and time. She appears well-developed and well-nourished.   HENT:   Head: Normocephalic and atraumatic.   Right Ear: External ear normal.   Left Ear: External ear normal.   Erythematous pharynx, no exudate, no lesion, uvula midline.     Eyes: Pupils are equal, round, and reactive to light. EOM are normal.   Left lower eyelid swelling, redness, tenderness.   Neck: Normal range of motion. Neck supple.   Cardiovascular: Normal rate, regular rhythm and normal heart sounds. Exam reveals no gallop and no friction rub.   No murmur heard.  Pulmonary/Chest: Breath sounds normal. No respiratory distress. She has no wheezes. She has no rales. She exhibits no tenderness.   Abdominal: Soft. Bowel sounds are normal. She exhibits no distension. There is no tenderness. There is no rebound and no guarding.   Musculoskeletal: Normal range of motion. She exhibits no edema, tenderness or deformity.   Lymphadenopathy:     She has no cervical adenopathy.   Neurological: She is alert and oriented to person, place, and time. No cranial nerve deficit. She exhibits normal muscle tone. Coordination normal.   Skin: Skin is warm. Capillary refill takes less than 2 seconds. No rash noted. No erythema. No pallor.   Psychiatric: She has a normal mood and affect. Her behavior is normal. Judgment and thought content normal.   Vitals reviewed.      Assessment:       1. Hordeolum externum of left lower eyelid    2. Sore throat        Plan:         Hordeolum externum of left lower eyelid  -     doxycycline (VIBRAMYCIN) 100 MG Cap; Take 1 capsule (100 mg total) by mouth 2 (two) times daily.  Dispense: 20 capsule; Refill: 0    Sore throat  -     POCT rapid strep A          Patient Instructions     Sty (or Stye)  A sty is an infection of the oil gland of the eyelid. It may develop into a small pocket of pus (abscess). This can cause pain, redness, and swelling. In early stages, styes are treated with antibiotic cream, eye drops,  or warm packs (small towels soaked in warm water). More severe cases may need to be opened and drained by a health care provider.  Home care  · Eye drops or ointment are usually prescribed to treat the infection. Use these as directed.   ¨ Artificial tears may also be used to lubricate the eye and make it more comfortable. These may be purchased without a prescription.   ¨ Talk to your health care provider before using any over-the-counter treatment for a sty.  · Apply a warm, damp towel to the affected eye for at least 5 minutes, 3 to 4 times a day for a week. Warm compresses open the pores and speed the healing. If the compresses are too hot, they may burn your eyelid.  · Sometimes the sty will drain with this treatment alone. If this happens, continue the antibiotic until all the redness and swelling are gone.  · Wash your hands before and after touching the infected eye to avoid spreading the infection.  · Do not squeeze or try to puncture the sty.  Follow-up care  Follow up with your health care provider, or as advised.   When to seek medical advice  Call your health care provider right away if you have:  · Increase in swelling or redness around the eyelid after 48 to 72 hours  · Increase in eye pain or the eyelid blisters  · Increase in warmth--the eyelid feels hot  · Drainage of blood or thick pus from the sty  · Blister on the eyelid  · Inability to open the eyelid due to swelling  · Fever  ¨ 1 degree above your normal temperature lasting for 24 to 48 hours, or  ¨ Whatever your health care provider told you to report based on your medical condition  · Vision changes  · Headache or stiff neck  · Recurrence of the sty  Date Last Reviewed: 6/14/2015 © 2000-2017 [employer]. [address] [place] [address]. All rights reserved. This information is not intended as a substitute for professional medical care. Always follow your healthcare professional's instructions.      Follow up with  your doctor in a few days as needed.  Return to the urgent care or go to the ER if symptoms get worse.    Carlitos Chowdhury MD

## 2019-04-22 ENCOUNTER — OFFICE VISIT (OUTPATIENT)
Dept: URGENT CARE | Facility: CLINIC | Age: 54
End: 2019-04-22
Payer: COMMERCIAL

## 2019-04-22 ENCOUNTER — TELEPHONE (OUTPATIENT)
Dept: OPHTHALMOLOGY | Facility: CLINIC | Age: 54
End: 2019-04-22

## 2019-04-22 ENCOUNTER — OCCUPATIONAL HEALTH (OUTPATIENT)
Dept: URGENT CARE | Facility: CLINIC | Age: 54
End: 2019-04-22
Payer: COMMERCIAL

## 2019-04-22 VITALS
WEIGHT: 196 LBS | HEART RATE: 80 BPM | HEIGHT: 66 IN | DIASTOLIC BLOOD PRESSURE: 80 MMHG | BODY MASS INDEX: 31.5 KG/M2 | SYSTOLIC BLOOD PRESSURE: 150 MMHG | TEMPERATURE: 99 F

## 2019-04-22 DIAGNOSIS — Z02.83 ENCOUNTER FOR DRUG SCREENING: Primary | ICD-10-CM

## 2019-04-22 DIAGNOSIS — M79.645 PAIN OF LEFT THUMB: Primary | ICD-10-CM

## 2019-04-22 LAB — BREATH ALCOHOL: YES

## 2019-04-22 PROCEDURE — 99203 OFFICE O/P NEW LOW 30 MIN: CPT | Mod: S$GLB,,, | Performed by: PHYSICIAN ASSISTANT

## 2019-04-22 PROCEDURE — 99203 PR OFFICE/OUTPT VISIT, NEW, LEVL III, 30-44 MIN: ICD-10-PCS | Mod: S$GLB,,, | Performed by: PHYSICIAN ASSISTANT

## 2019-04-22 PROCEDURE — 80305 DRUG TEST PRSMV DIR OPT OBS: CPT | Mod: S$GLB,,, | Performed by: PHYSICIAN ASSISTANT

## 2019-04-22 PROCEDURE — 73140 X-RAY EXAM OF FINGER(S): CPT | Mod: FY,LT,S$GLB, | Performed by: RADIOLOGY

## 2019-04-22 PROCEDURE — 73140 XR FINGER 2 OR MORE VIEWS: ICD-10-PCS | Mod: FY,LT,S$GLB, | Performed by: RADIOLOGY

## 2019-04-22 PROCEDURE — 80305 OOH COLLECTION ONLY DRUG SCREEN: ICD-10-PCS | Mod: S$GLB,,, | Performed by: PHYSICIAN ASSISTANT

## 2019-04-22 PROCEDURE — 82075 ASSAY OF BREATH ETHANOL: CPT | Mod: S$GLB,,, | Performed by: PHYSICIAN ASSISTANT

## 2019-04-22 PROCEDURE — 82075 POCT BREATH ALCOHOL TEST: ICD-10-PCS | Mod: S$GLB,,, | Performed by: PHYSICIAN ASSISTANT

## 2019-04-22 RX ORDER — ACETAMINOPHEN 500 MG
1000 TABLET ORAL EVERY 6 HOURS PRN
Qty: 60 TABLET | Refills: 0 | Status: SHIPPED | OUTPATIENT
Start: 2019-04-22 | End: 2020-04-21

## 2019-04-22 NOTE — TELEPHONE ENCOUNTER
Returned patients phone call to offer an appointment due to compliant of stars in vision.  Left message on VM for patient to call department back.

## 2019-04-22 NOTE — LETTER
[facility]  [place]  [address] [place] [address]  Phone: [phone]  Fax: [phone]  [facility]: [email or web address]    Pt Name: Talya Bates  Injury Date: 04/22/2019   Employee ID: [id number] Date of First Treatment: 04/22/2019   Company:  [employer]      Appointment Time: 02:35 PM Arrived: 2:40 PM   Provider: Buddy Evans PA-C Time Out:4:30 PM     Office Treatment:   EXAM  DRUG AND ALCOHOL TEST  X-RAYS TAKEN  PRESCRIPTION GIVEN  LIGHT DUTY    1. Pain of left thumb      Medications Ordered This Encounter   Medications    acetaminophen (TYLENOL EXTRA STRENGTH) 500 MG tablet      Patient Instructions: Apply ice 24-48 hours then apply heat/warm soaks    Restrictions: Limited use of left hand and arm     Return Appointment: 4/29/2019 at 08:50 AM

## 2019-04-22 NOTE — PROGRESS NOTES
Subjective:       Patient ID: Talya Bates is a 54 y.o. female.    Chief Complaint: Hand Injury (Left thumb 4/22/19 @ 10:10 am)    Patient is a  for the [email or web address]. Patient states at 10:10 am on 4/22/19 while lifting 2 chairs she caught her left thumb between them. She continued working until pain worsened. C/o sharp pain from left thumb all the way up forearm to elbow. No prior injury, no tx, pt is right handed.     Hand Injury    Her dominant hand is their right hand. The incident occurred at work. The pain is present in the left fingers. The quality of the pain is described as burning, shooting and stabbing. The pain radiates to the left arm. The pain is at a severity of 7/10. The pain is moderate. The pain has been constant since the incident. Pertinent negatives include no chest pain or numbness. The symptoms are aggravated by movement. She has tried nothing for the symptoms.     Review of Systems   Constitution: Negative for chills and fever.   HENT: Negative for hearing loss, nosebleeds and sore throat.    Eyes: Negative for blurred vision and redness.   Cardiovascular: Negative for chest pain and syncope.   Respiratory: Negative for cough and shortness of breath.    Hematologic/Lymphatic: Negative for bleeding problem.   Skin: Negative for color change and rash.   Musculoskeletal: Positive for joint pain and joint swelling. Negative for back pain and neck pain.   Gastrointestinal: Negative for abdominal pain, diarrhea, nausea and vomiting.   Neurological: Negative for headaches, numbness and paresthesias.   Psychiatric/Behavioral: The patient is not nervous/anxious.        Objective:      Physical Exam   Constitutional: She appears well-developed and well-nourished. She is active. No distress.   HENT:   Head: Normocephalic and atraumatic.   Right Ear: Hearing and external ear normal.   Left Ear: Hearing and external ear normal.   Nose: Nose normal. No nasal deformity. No epistaxis.    Mouth/Throat: Oropharynx is clear and moist and mucous membranes are normal.   Eyes: Conjunctivae and lids are normal. No scleral icterus.   Neck: Trachea normal and normal range of motion.   Cardiovascular: Intact distal pulses and normal pulses.   Pulmonary/Chest: Effort normal. No stridor. No respiratory distress.   Musculoskeletal:        Left hand: She exhibits decreased range of motion (Left thumb) and tenderness. She exhibits normal capillary refill, no deformity, no laceration and no swelling. Normal sensation noted. Decreased strength noted.        Hands:  Neurological: She is alert. She has normal strength. She is not disoriented. No sensory deficit. GCS eye subscore is 4. GCS verbal subscore is 5. GCS motor subscore is 6.   Skin: Skin is warm, dry and intact. Capillary refill takes less than 2 seconds. She is not diaphoretic.   Psychiatric: She has a normal mood and affect. Her speech is normal and behavior is normal. She is attentive.   Nursing note and vitals reviewed.        Type of Reading: me.  Radiology Procedure Done: Left Thumb.  Interpretation: No fracture or dislocation        Assessment:       1. Pain of left thumb        Plan:         Medications Ordered This Encounter   Medications    acetaminophen (TYLENOL EXTRA STRENGTH) 500 MG tablet     Sig: Take 2 tablets (1,000 mg total) by mouth every 6 (six) hours as needed for Pain (Do not take more than 8 tablets in 24 hours.).     Dispense:  60 tablet     Refill:  0     Patient Instructions: Apply ice 24-48 hours then apply heat/warm soaks   Restrictions: Limited use of left hand and arm  Follow up in about 1 week (around 4/29/2019).

## 2019-04-23 ENCOUNTER — TELEPHONE (OUTPATIENT)
Dept: OPHTHALMOLOGY | Facility: CLINIC | Age: 54
End: 2019-04-23

## 2019-04-24 ENCOUNTER — TELEPHONE (OUTPATIENT)
Dept: RHEUMATOLOGY | Facility: CLINIC | Age: 54
End: 2019-04-24

## 2019-04-24 NOTE — TELEPHONE ENCOUNTER
----- Message from Karlie Altagracia sent at 4/23/2019  4:44 PM CDT -----  Contact: self, [phone]  New patient requests to know if you specialize in Lupus before she schedules an appointment. Please advise.

## 2019-04-24 NOTE — TELEPHONE ENCOUNTER
Called pt to clarify on her question about the lab orders to [facility]. If she needs them sent or she had the labs done

## 2019-04-29 DIAGNOSIS — M32.9 SYSTEMIC LUPUS ERYTHEMATOSUS, UNSPECIFIED SLE TYPE, UNSPECIFIED ORGAN INVOLVEMENT STATUS: Primary | ICD-10-CM

## 2019-05-01 LAB
ALBUMIN SERPL-MCNC: 4.6 G/DL (ref 3.5–5.5)
ALBUMIN/GLOB SERPL: 1.6 {RATIO} (ref 1.2–2.2)
ALP SERPL-CCNC: 62 IU/L (ref 39–117)
ALT SERPL-CCNC: 10 IU/L (ref 0–32)
APPEARANCE UR: CLEAR
AST SERPL-CCNC: 24 IU/L (ref 0–40)
BACTERIA #/AREA URNS HPF: ABNORMAL /[HPF]
BASOPHILS # BLD AUTO: 0 X10E3/UL (ref 0–0.2)
BASOPHILS NFR BLD AUTO: 1 %
BILIRUB SERPL-MCNC: 0.3 MG/DL (ref 0–1.2)
BILIRUB UR QL STRIP: NEGATIVE
BUN SERPL-MCNC: 65 MG/DL (ref 6–24)
BUN/CREAT SERPL: 20 (ref 9–23)
C3 SERPL-MCNC: 94 MG/DL (ref 82–167)
C4 SERPL-MCNC: 30 MG/DL (ref 14–44)
CALCIUM SERPL-MCNC: 9.7 MG/DL (ref 8.7–10.2)
CASTS URNS MICRO: ABNORMAL
CASTS URNS QL MICRO: PRESENT /LPF
CHLORIDE SERPL-SCNC: 109 MMOL/L (ref 96–106)
CO2 SERPL-SCNC: 17 MMOL/L (ref 20–29)
COLOR UR: YELLOW
CREAT SERPL-MCNC: 3.2 MG/DL (ref 0.57–1)
CREAT UR-MCNC: 137 MG/DL
CRP SERPL-MCNC: 0.5 MG/L (ref 0–4.9)
DSDNA AB SER-ACNC: 1 IU/ML (ref 0–9)
EOSINOPHIL # BLD AUTO: 0.1 X10E3/UL (ref 0–0.4)
EOSINOPHIL NFR BLD AUTO: 3 %
EPI CELLS #/AREA URNS HPF: ABNORMAL /HPF (ref 0–10)
ERYTHROCYTE [DISTWIDTH] IN BLOOD BY AUTOMATED COUNT: 13.8 % (ref 12.3–15.4)
ERYTHROCYTE [SEDIMENTATION RATE] IN BLOOD BY WESTERGREN METHOD: 12 MM/HR (ref 0–40)
GLOBULIN SER CALC-MCNC: 2.9 G/DL (ref 1.5–4.5)
GLUCOSE SERPL-MCNC: 89 MG/DL (ref 65–99)
GLUCOSE UR QL: NEGATIVE
HCT VFR BLD AUTO: 34.4 % (ref 34–46.6)
HGB BLD-MCNC: 11.2 G/DL (ref 11.1–15.9)
HGB UR QL STRIP: NEGATIVE
IMM GRANULOCYTES # BLD AUTO: 0 X10E3/UL (ref 0–0.1)
IMM GRANULOCYTES NFR BLD AUTO: 0 %
KETONES UR QL STRIP: NEGATIVE
LEUKOCYTE ESTERASE UR QL STRIP: NEGATIVE
LYMPHOCYTES # BLD AUTO: 1.1 X10E3/UL (ref 0.7–3.1)
LYMPHOCYTES NFR BLD AUTO: 40 %
MCH RBC QN AUTO: 29.1 PG (ref 26.6–33)
MCHC RBC AUTO-ENTMCNC: 32.6 G/DL (ref 31.5–35.7)
MCV RBC AUTO: 89 FL (ref 79–97)
MICRO URNS: ABNORMAL
MONOCYTES # BLD AUTO: 0.4 X10E3/UL (ref 0.1–0.9)
MONOCYTES NFR BLD AUTO: 15 %
MUCOUS THREADS URNS QL MICRO: PRESENT
NEUTROPHILS # BLD AUTO: 1.2 X10E3/UL (ref 1.4–7)
NEUTROPHILS NFR BLD AUTO: 41 %
NITRITE UR QL STRIP: NEGATIVE
PH UR STRIP: 5.5 [PH] (ref 5–7.5)
PLATELET # BLD AUTO: 187 X10E3/UL (ref 150–379)
POTASSIUM SERPL-SCNC: 4.9 MMOL/L (ref 3.5–5.2)
PROT SERPL-MCNC: 7.5 G/DL (ref 6–8.5)
PROT UR QL STRIP: ABNORMAL
PROT UR-MCNC: 47.5 MG/DL
PROT/CREAT UR: 347 MG/G CREAT (ref 0–200)
RBC # BLD AUTO: 3.85 X10E6/UL (ref 3.77–5.28)
RBC #/AREA URNS HPF: ABNORMAL /HPF (ref 0–2)
SODIUM SERPL-SCNC: 146 MMOL/L (ref 134–144)
SP GR UR: 1.02 (ref 1–1.03)
UROBILINOGEN UR STRIP-MCNC: 0.2 MG/DL (ref 0.2–1)
WBC # BLD AUTO: 2.8 X10E3/UL (ref 3.4–10.8)
WBC #/AREA URNS HPF: ABNORMAL /HPF (ref 0–5)

## 2019-05-16 ENCOUNTER — CLINICAL SUPPORT (OUTPATIENT)
Dept: OTHER | Facility: CLINIC | Age: 54
End: 2019-05-16
Payer: COMMERCIAL

## 2019-05-16 DIAGNOSIS — Z00.8 ENCOUNTER FOR OTHER GENERAL EXAMINATION: ICD-10-CM

## 2019-05-16 PROCEDURE — 80061 LIPID PANEL: CPT | Mod: QW,S$GLB,, | Performed by: INTERNAL MEDICINE

## 2019-05-16 PROCEDURE — 99401 PR PREVENT COUNSEL,INDIV,15 MIN: ICD-10-PCS | Mod: S$GLB,,, | Performed by: INTERNAL MEDICINE

## 2019-05-16 PROCEDURE — 99401 PREV MED CNSL INDIV APPRX 15: CPT | Mod: S$GLB,,, | Performed by: INTERNAL MEDICINE

## 2019-05-16 PROCEDURE — 82947 PR  ASSAY QUANTITATIVE,BLOOD GLUCOSE: ICD-10-PCS | Mod: QW,S$GLB,, | Performed by: INTERNAL MEDICINE

## 2019-05-16 PROCEDURE — 80061 PR  LIPID PANEL: ICD-10-PCS | Mod: QW,S$GLB,, | Performed by: INTERNAL MEDICINE

## 2019-05-16 PROCEDURE — 82947 ASSAY GLUCOSE BLOOD QUANT: CPT | Mod: QW,S$GLB,, | Performed by: INTERNAL MEDICINE

## 2019-05-18 VITALS — HEIGHT: 72 IN | BODY MASS INDEX: 25.16 KG/M2

## 2019-05-18 LAB
HDLC SERPL-MCNC: 76 MG/DL
POC CHOLESTEROL, LDL (DOCK): 110 MG/DL
POC CHOLESTEROL, TOTAL: 226 MG/DL
POC GLUCOSE, FASTING: 83 MG/DL (ref 60–110)
TRIGL SERPL-MCNC: 202 MG/DL

## 2019-05-21 DIAGNOSIS — H00.015 HORDEOLUM EXTERNUM OF LEFT LOWER EYELID: ICD-10-CM

## 2019-05-21 RX ORDER — DOXYCYCLINE 100 MG/1
100 CAPSULE ORAL 2 TIMES DAILY
Qty: 20 CAPSULE | Refills: 0 | Status: SHIPPED | OUTPATIENT
Start: 2019-05-21 | End: 2019-05-31

## 2019-06-03 ENCOUNTER — OFFICE VISIT (OUTPATIENT)
Dept: RHEUMATOLOGY | Facility: CLINIC | Age: 54
End: 2019-06-03
Payer: COMMERCIAL

## 2019-06-03 VITALS
HEART RATE: 70 BPM | HEIGHT: 65 IN | WEIGHT: 198.19 LBS | BODY MASS INDEX: 33.02 KG/M2 | SYSTOLIC BLOOD PRESSURE: 107 MMHG | DIASTOLIC BLOOD PRESSURE: 70 MMHG

## 2019-06-03 DIAGNOSIS — M32.9 SYSTEMIC LUPUS ERYTHEMATOSUS, UNSPECIFIED SLE TYPE, UNSPECIFIED ORGAN INVOLVEMENT STATUS: Primary | ICD-10-CM

## 2019-06-03 PROCEDURE — 3074F PR MOST RECENT SYSTOLIC BLOOD PRESSURE < 130 MM HG: ICD-10-PCS | Mod: CPTII,S$GLB,, | Performed by: INTERNAL MEDICINE

## 2019-06-03 PROCEDURE — 3074F SYST BP LT 130 MM HG: CPT | Mod: CPTII,S$GLB,, | Performed by: INTERNAL MEDICINE

## 2019-06-03 PROCEDURE — 99999 PR PBB SHADOW E&M-EST. PATIENT-LVL III: ICD-10-PCS | Mod: PBBFAC,,, | Performed by: INTERNAL MEDICINE

## 2019-06-03 PROCEDURE — 99214 OFFICE O/P EST MOD 30 MIN: CPT | Mod: S$GLB,,, | Performed by: INTERNAL MEDICINE

## 2019-06-03 PROCEDURE — 99214 PR OFFICE/OUTPT VISIT, EST, LEVL IV, 30-39 MIN: ICD-10-PCS | Mod: S$GLB,,, | Performed by: INTERNAL MEDICINE

## 2019-06-03 PROCEDURE — 3078F DIAST BP <80 MM HG: CPT | Mod: CPTII,S$GLB,, | Performed by: INTERNAL MEDICINE

## 2019-06-03 PROCEDURE — 3008F BODY MASS INDEX DOCD: CPT | Mod: CPTII,S$GLB,, | Performed by: INTERNAL MEDICINE

## 2019-06-03 PROCEDURE — 3078F PR MOST RECENT DIASTOLIC BLOOD PRESSURE < 80 MM HG: ICD-10-PCS | Mod: CPTII,S$GLB,, | Performed by: INTERNAL MEDICINE

## 2019-06-03 PROCEDURE — 99999 PR PBB SHADOW E&M-EST. PATIENT-LVL III: CPT | Mod: PBBFAC,,, | Performed by: INTERNAL MEDICINE

## 2019-06-03 PROCEDURE — 3008F PR BODY MASS INDEX (BMI) DOCUMENTED: ICD-10-PCS | Mod: CPTII,S$GLB,, | Performed by: INTERNAL MEDICINE

## 2019-06-03 RX ORDER — PREDNISONE 5 MG/1
5 TABLET ORAL DAILY
Qty: 30 TABLET | Refills: 5 | Status: SHIPPED | OUTPATIENT
Start: 2019-06-03 | End: 2019-07-03

## 2019-06-03 RX ORDER — HYDROXYCHLOROQUINE SULFATE 200 MG/1
200 TABLET, FILM COATED ORAL DAILY
Qty: 30 TABLET | Refills: 6 | Status: SHIPPED | OUTPATIENT
Start: 2019-06-03 | End: 2019-07-03

## 2019-06-03 RX ORDER — MYCOPHENOLATE MOFETIL 500 MG/1
1500 TABLET ORAL DAILY
Qty: 90 TABLET | Refills: 6 | Status: SHIPPED | OUTPATIENT
Start: 2019-06-03 | End: 2019-07-03

## 2019-06-03 ASSESSMENT — ROUTINE ASSESSMENT OF PATIENT INDEX DATA (RAPID3)
TOTAL RAPID3 SCORE: 6.17
MDHAQ FUNCTION SCORE: 1.2
FATIGUE SCORE: 7
PSYCHOLOGICAL DISTRESS SCORE: 3.3
AM STIFFNESS SCORE: 1, YES
PAIN SCORE: 7.5
PATIENT GLOBAL ASSESSMENT SCORE: 7

## 2019-06-03 ASSESSMENT — SYSTEMIC LUPUS ERYTHEMATOSUS DISEASE ACTIVITY INDEX (SLEDAI): TOTAL_SCORE: 0

## 2019-06-03 NOTE — PROGRESS NOTES
Chief Complaint   Patient presents with    Disease Management       Patient with systemic lupus for a follow up    BRUCE positive 1: 1280 /SSA positive  Dsdna negative,SSB negative,smith negative  Low C3,C4  Negative cardiolipin/beta 2 glycoprotein  Negative RF,CCP    Her lab trend    9/2018  nml inflammatory markers  Proteinuria down to 500 mg/g  nml complements  White count 3.7  H/H 9.1/28.6  Plt count 236  GFR 14  Neg dsdna    11/2018  White count 2.8  H/H 10.9/34.4  nml plts  Normal ESR  Urine protein 1+  They didn't do urine spot protein/creat  nml complements and dsdna   GFR at 20    2/8/2019    Normal complements  Dsdna neg  CRP nml  GFR 19  White count 3.2  H/H 10.7/33.4  Urine protein/creat 567 mg    4/2019    Normal complements  Dsdna neg  CRP,ESR nml  Urine protein/creat 347 mg/g  GFR 18  White count 2.8 /kmcl  Liver function normal    She was on prednisone 5 mg,plaquenil 200 mg and cellcept 1500 mg  On plaquenil 200 mg daily now      History of presenting illness    Talya Bates is a 53 y.o. female with systemic lupus erythematosus manifested by Class IV nephritis, history of hemolytic anemia,malar rash, a low white cell count, arthritis, photosensitivity, hair loss, dry eyes, dry mouth, swollen lymph glands, fatigue and possibly Raynaud's.   She has h/o positive HSV 1 and HSV 2    Right temporal artery pain and enlargement. Biopsy negative.   CTA of head and neck without vasculitis but small lymph nodes noted.    Last visit : 's note :    Hold the cellcept : she held it for 6 months   Plaquenil was resumed : but she never got the medication    For 6 months now : she is off all medications    Hospitalisation : 7/19 to 7/24 :    She was short of breath but no cough : She was found to be anemic : she got blood transfusion and she also got treated for hemorrhoids,but they were not bleeding.They finally thought she had hemolytic anemia : KULWINDER positive   She had normal complements and she had  normal dsdna   Her hapto was < 10  Her LDH was 292  Her high sensitivity CRP was 6.48  Her ESR was > 150  She had a viral syndrome prior to the onset of the shortness of breath.  She had no rash,no ulcers,no patchy alopecia,no joint pains  Solumedrol 125 mg one dose   Prednisone 60 mg daily   Plaquenil 200 mg daily was resumed  Cellcept 500 mg daily was resumed     Nephrology :  Not on dialysis   She used to see [facility] nephrology but now moving care to [facility]  Her baseline creatinine is 3,but recently she had creatinine of 5.5 : she had protein,RBCs,white cells,nitrites,bacteria : protein/creatinine ratio was 0.61 : they thought her renal issue was lupus flare vs infection vs hemolysis   Her UPC the past year was 1 gm +  She had high inflammatory markers,normal complements  Finally with hydration and steroids : the day of d/c her creatinine was 3.6    Hematology : 8/6  She has one DVT in 2012 : not on anticoagulants  She has chronic anemia,low hapto < 10 retic > 150 and positive Zackary   On the day of d/c her Hb was 9.2/Hct was 26.7    She has no leukopenia recently  In dec 2017,when she was on cellcept,her white count had dropped to 2.98 from 6.35  When  held her cellcept it improved to 3.47     Never had dermatology evaluation  Has a rash on her body which itches now and then    Blood pressure : today very high  She has been off losartan for a while now    Past history : systemic lupus with hemolytic anemia,vit d deficiency,fatigue,E nodosum,lupus nephritis,CKD stage 4    Family history : none relevant    Social history : never a smoker or alcoholic      Review of Systems   Constitutional: Negative for activity change, appetite change, chills, diaphoresis, fatigue, fever and unexpected weight change.   HENT: Negative for congestion, dental problem, drooling, ear discharge, ear pain, facial swelling, hearing loss, mouth sores, nosebleeds, postnasal drip, rhinorrhea, sinus pressure, sinus pain, sneezing,  sore throat, tinnitus, trouble swallowing and voice change.    Eyes: Negative for photophobia, pain, discharge, redness, itching and visual disturbance.   Respiratory: Negative for apnea, cough, choking, chest tightness, shortness of breath, wheezing and stridor.    Cardiovascular: Negative for chest pain, palpitations and leg swelling.   Gastrointestinal: Negative for abdominal distention, abdominal pain, anal bleeding, blood in stool, constipation, diarrhea, nausea, rectal pain and vomiting.   Endocrine: Negative for cold intolerance, heat intolerance, polydipsia, polyphagia and polyuria.   Genitourinary: Negative for decreased urine volume, difficulty urinating, dysuria, enuresis, flank pain, frequency, genital sores, hematuria and urgency.   Musculoskeletal: Negative for arthralgias, back pain, gait problem, joint swelling, myalgias, neck pain and neck stiffness.   Skin: Negative for color change, pallor, rash and wound.   Allergic/Immunologic: Negative for environmental allergies, food allergies and immunocompromised state.   Neurological: Negative for dizziness, tremors, seizures, syncope, facial asymmetry, speech difficulty, weakness, light-headedness, numbness and headaches.   Hematological: Negative for adenopathy. Does not bruise/bleed easily.   Psychiatric/Behavioral: Negative for agitation, behavioral problems, confusion, decreased concentration, dysphoric mood, hallucinations, self-injury, sleep disturbance and suicidal ideas. The patient is not nervous/anxious and is not hyperactive.      Physical Exam     CAREY-28 tender joint count: 0  CAREY-28 swollen joint count: 0    Physical Exam   Constitutional: She is oriented to person, place, and time and well-developed, well-nourished, and in no distress. No distress.   HENT:   Head: Normocephalic.   Mouth/Throat: Oropharynx is clear and moist.   Eyes: Conjunctivae are normal. Pupils are equal, round, and reactive to light. Right eye exhibits no discharge. Left  eye exhibits no discharge. No scleral icterus.   Neck: Normal range of motion. No thyromegaly present.   Cardiovascular: Normal rate, regular rhythm, normal heart sounds and intact distal pulses.    Pulmonary/Chest: Effort normal and breath sounds normal. No stridor.   Abdominal: Soft. Bowel sounds are normal.   Lymphadenopathy:     She has no cervical adenopathy.   Neurological: She is alert and oriented to person, place, and time.   Skin: Skin is warm. No rash noted. She is not diaphoretic.     Psychiatric: Affect and judgment normal.   Musculoskeletal: Normal range of motion.       Assessment     54 year old black female with systemic lupus/class IV nephritis and prior hemolytic anemia,leukopenia,with symptoms of malar rash,photosensitivity,arthritis, hair loss, dry eyes, dry mouth, swollen lymph glands, fatigue and possibly Raynaud's.  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    She was last seen by  12/2017 and she lost to follow up    She had uneventful 6 month period but recently went in with shortness of breath and was found to have severe anemia attributed to hemolysis due to systemic lupus    She has fatigue/fibromyalgia/mood symptoms    She was recently hospitalised with hb of 6.2 : this was due to hemolysis due to lupus  Day of d/c Hb was 9.2  She was on 60 mg prednisone  Cellcept 500 mg was resumed  Plaquenil 200 mg was resumed    We sent her to hematology : they tapered her prednisone and she is down to 5 mg  We titrated her cellcept to 2000 mg  Her white count dropped    So we tapered down the cellcept to 1500 mg   11 k/mcl in august to 3.7 in September to 2.8 in October and then 3.2 and again 2.8     Leukopenia is probably due to cellcept vs the lupus  But the low white count is not causing infections  The dose of cellcept is keeping her lupus under control  So I dont want to make any changes to the regimen but just follow the counts     Fatigue is the only complaint         Plan:       Continue cellcept at  1500 mg daily  Plaquenil 200 mg to continue  Prednisone : she wants to keep it at 5 mg     Recheck counts in 3 months     Follow up with nephrology and hematology   She needs a nephrology clinic evaluation    I will rpt the lupus monitoring labs in 3 Months      1. Systemic lupus erythematosus, unspecified SLE type, unspecified organ involvement status        Talya was seen today for disease management.    Diagnoses and all orders for this visit:    Systemic lupus erythematosus, unspecified SLE type, unspecified organ involvement status  -     CBC auto differential; Future  -     Comprehensive metabolic panel; Future  -     Sedimentation rate; Future  -     C-reactive protein; Future  -     Anti-DNA antibody, double-stranded; Future  -     Protein / creatinine ratio, urine; Future  -     Urinalysis; Future  -     C3 complement; Future  -     C4 complement; Future  -     CBC auto differential  -     Comprehensive metabolic panel  -     Sedimentation rate  -     C-reactive protein  -     Anti-DNA antibody, double-stranded  -     Protein / creatinine ratio, urine  -     Urinalysis  -     C3 complement  -     C4 complement    Other orders  -     mycophenolate (CELLCEPT) 500 mg Tab; Take 3 tablets (1,500 mg total) by mouth once daily.  -     hydroxychloroquine (PLAQUENIL) 200 mg tablet; Take 1 tablet (200 mg total) by mouth once daily.        dexa offered not done yet    Prednisone use : suggested calcium and vit d    She says she is UTD with flu and pneumonia vaccinations

## 2019-06-03 NOTE — PATIENT INSTRUCTIONS
Stable lupus labs today  White count needs monitoring  Labs and refills printed today  Return in 3 months

## 2019-06-11 ENCOUNTER — OFFICE VISIT (OUTPATIENT)
Dept: URGENT CARE | Facility: CLINIC | Age: 54
End: 2019-06-11
Payer: COMMERCIAL

## 2019-06-11 DIAGNOSIS — S63.602D SPRAIN OF LEFT THUMB, UNSPECIFIED SITE OF FINGER, SUBSEQUENT ENCOUNTER: ICD-10-CM

## 2019-06-11 DIAGNOSIS — M79.645 PAIN OF LEFT THUMB: Primary | ICD-10-CM

## 2019-06-11 PROCEDURE — 99214 PR OFFICE/OUTPT VISIT, EST, LEVL IV, 30-39 MIN: ICD-10-PCS | Mod: S$GLB,,, | Performed by: PREVENTIVE MEDICINE

## 2019-06-11 PROCEDURE — 99214 OFFICE O/P EST MOD 30 MIN: CPT | Mod: S$GLB,,, | Performed by: PREVENTIVE MEDICINE

## 2019-06-11 NOTE — LETTER
[facility]  [address] [address]  Phone: [phone]  Fax: [phone]  [facility] [facility]: [id number]    Pt Name: Talya Bates  Injury Date: 04/22/2019   Employee ID:  Date of First Treatment: 06/11/2019   Company: [employer]      Appointment Time: 01:00 PM Arrived: 1300 PM   Provider: Vasu Pelayo MD Time Out: 1415 PM     Office Treatment:   EXAM  DISCHARGED TO FULL DUTY    1. Pain of left thumb    2. Sprain of left thumb, unspecified site of finger, subsequent encounter          Patient Instructions: Daily home exercises/warm soaks      Restrictions: Regular Duty, Discharged from Occupational Health     Return Appointment: NONE

## 2019-06-11 NOTE — PROGRESS NOTES
Subjective:       Patient ID: Talya Bates is a 54 y.o. female.    Chief Complaint: Hand Pain (Left thumb 4/22/19)    Patient is a follow up for left thumb injury from 4/22/19. 0/10, WLD, same meds. Ambulatory. MJB    Hand Pain    The incident occurred at work. The pain is present in the left fingers. The quality of the pain is described as aching. The pain does not radiate. The pain is at a severity of 0/10. The patient is experiencing no pain. The pain has been improving since the incident. Pertinent negatives include no chest pain. She has tried acetaminophen and NSAIDs for the symptoms. The treatment provided significant relief.     Review of Systems   Constitution: Negative for chills and fever.   HENT: Negative for sore throat.    Eyes: Negative for blurred vision.   Cardiovascular: Negative for chest pain.   Respiratory: Negative for shortness of breath.    Skin: Negative for rash.   Musculoskeletal: Positive for joint pain. Negative for back pain.   Gastrointestinal: Negative for abdominal pain, diarrhea, nausea and vomiting.   Neurological: Negative for headaches.   Psychiatric/Behavioral: The patient is not nervous/anxious.        Objective:      Physical Exam   Constitutional: She appears well-developed and well-nourished. She is active. No distress.   HENT:   Head: Normocephalic and atraumatic.   Right Ear: Hearing and external ear normal.   Left Ear: Hearing and external ear normal.   Nose: Nose normal. No nasal deformity. No epistaxis.   Mouth/Throat: Oropharynx is clear and moist and mucous membranes are normal.   Eyes: Conjunctivae and lids are normal. No scleral icterus.   Neck: Trachea normal and normal range of motion.   Cardiovascular: Intact distal pulses and normal pulses.   Pulmonary/Chest: Effort normal. No stridor. No respiratory distress.   Musculoskeletal:        Left hand: She exhibits decreased range of motion (Left thumb) and tenderness. She exhibits normal capillary refill, no  deformity, no laceration and no swelling. Normal sensation noted. Decreased strength noted.        Hands:  Neurological: She is alert. She has normal strength. She is not disoriented. No sensory deficit. GCS eye subscore is 4. GCS verbal subscore is 5. GCS motor subscore is 6.   Skin: Skin is warm, dry and intact. Capillary refill takes less than 2 seconds. She is not diaphoretic.   Psychiatric: She has a normal mood and affect. Her speech is normal and behavior is normal. She is attentive.   Nursing note and vitals reviewed.      Assessment:       1. Pain of left thumb    2. Sprain of left thumb, unspecified site of finger, subsequent encounter        Plan:            Patient Instructions: Daily home exercises/warm soaks   Restrictions: Regular Duty, Discharged from Occupational Health  Follow up if symptoms worsen or fail to improve.

## 2019-06-21 ENCOUNTER — OFFICE VISIT (OUTPATIENT)
Dept: OBSTETRICS AND GYNECOLOGY | Facility: CLINIC | Age: 54
End: 2019-06-21
Payer: COMMERCIAL

## 2019-06-21 VITALS — SYSTOLIC BLOOD PRESSURE: 122 MMHG | BODY MASS INDEX: 31.37 KG/M2 | WEIGHT: 188.5 LBS | DIASTOLIC BLOOD PRESSURE: 90 MMHG

## 2019-06-21 DIAGNOSIS — Z01.419 ENCOUNTER FOR GYNECOLOGICAL EXAMINATION WITHOUT ABNORMAL FINDING: Primary | ICD-10-CM

## 2019-06-21 DIAGNOSIS — N95.1 HOT FLASHES DUE TO MENOPAUSE: ICD-10-CM

## 2019-06-21 DIAGNOSIS — Z12.39 SCREENING BREAST EXAMINATION: ICD-10-CM

## 2019-06-21 DIAGNOSIS — Z12.4 CERVICAL CANCER SCREENING: ICD-10-CM

## 2019-06-21 DIAGNOSIS — N39.41 URGE INCONTINENCE: ICD-10-CM

## 2019-06-21 PROCEDURE — 3074F PR MOST RECENT SYSTOLIC BLOOD PRESSURE < 130 MM HG: ICD-10-PCS | Mod: CPTII,S$GLB,, | Performed by: OBSTETRICS & GYNECOLOGY

## 2019-06-21 PROCEDURE — 87086 URINE CULTURE/COLONY COUNT: CPT

## 2019-06-21 PROCEDURE — 88175 CYTOPATH C/V AUTO FLUID REDO: CPT

## 2019-06-21 PROCEDURE — 87624 HPV HI-RISK TYP POOLED RSLT: CPT

## 2019-06-21 PROCEDURE — 99396 PREV VISIT EST AGE 40-64: CPT | Mod: S$GLB,,, | Performed by: OBSTETRICS & GYNECOLOGY

## 2019-06-21 PROCEDURE — 3080F PR MOST RECENT DIASTOLIC BLOOD PRESSURE >= 90 MM HG: ICD-10-PCS | Mod: CPTII,S$GLB,, | Performed by: OBSTETRICS & GYNECOLOGY

## 2019-06-21 PROCEDURE — 3080F DIAST BP >= 90 MM HG: CPT | Mod: CPTII,S$GLB,, | Performed by: OBSTETRICS & GYNECOLOGY

## 2019-06-21 PROCEDURE — 99999 PR PBB SHADOW E&M-EST. PATIENT-LVL III: ICD-10-PCS | Mod: PBBFAC,,, | Performed by: OBSTETRICS & GYNECOLOGY

## 2019-06-21 PROCEDURE — 99396 PR PREVENTIVE VISIT,EST,40-64: ICD-10-PCS | Mod: S$GLB,,, | Performed by: OBSTETRICS & GYNECOLOGY

## 2019-06-21 PROCEDURE — 99999 PR PBB SHADOW E&M-EST. PATIENT-LVL III: CPT | Mod: PBBFAC,,, | Performed by: OBSTETRICS & GYNECOLOGY

## 2019-06-21 PROCEDURE — 3074F SYST BP LT 130 MM HG: CPT | Mod: CPTII,S$GLB,, | Performed by: OBSTETRICS & GYNECOLOGY

## 2019-06-21 RX ORDER — PAROXETINE 7.5 MG/1
7.5 CAPSULE ORAL NIGHTLY
Qty: 90 CAPSULE | Refills: 4 | Status: SHIPPED | OUTPATIENT
Start: 2019-06-21 | End: 2021-10-19

## 2019-06-21 RX ORDER — OXYBUTYNIN CHLORIDE 10 MG/1
10 TABLET, EXTENDED RELEASE ORAL DAILY
Qty: 30 TABLET | Refills: 12 | Status: SHIPPED | OUTPATIENT
Start: 2019-06-21 | End: 2024-01-30

## 2019-06-21 RX ORDER — PAROXETINE 7.5 MG/1
7.5 CAPSULE ORAL NIGHTLY
Qty: 30 CAPSULE | Refills: 12 | Status: SHIPPED | OUTPATIENT
Start: 2019-06-21 | End: 2021-10-19

## 2019-06-21 NOTE — PROGRESS NOTES
55 yo postmenopausal female who presents for routine gyn visit.  Reports no cycles since 2010.   No longer having sex because it's too painful. Vagifem did not help her.  Patient reports h/o DVT about 5 yrs ago.  Reports that hot flashes are getting worse and that she would like medications for this problem.  She also complains of urge to urinate after drinking. Wearing depends causes her to have skin irritation.  Pap in 2017 was negative but + HR HPV.  No h/o abl mammograms. Per patient, last mammogram was at [facility].    ROS:  GENERAL: Denies weight gain or weight loss. Feeling well overall.   SKIN: Denies rash or lesions.   HEAD: Denies head injury or headache.   CHEST: Denies chest pain or shortness of breath.   CARDIOVASCULAR: Denies palpitations or left sided chest pain.   ABDOMEN: No abdominal pain, constipation, diarrhea, nausea, vomiting or rectal bleeding.   URINARY: No frequency, dysuria, hematuria, or burning on urination. Urge incontinence  REPRODUCTIVE: hot flashes, vaginal dryness.   BREASTS: The patient performs breast self-examination and denies pain, lumps, or nipple discharge.   HEMATOLOGIC: No easy bruisability or excessive bleeding.   MUSCULOSKELETAL: Denies joint pain or swelling.   NEUROLOGIC: Denies syncope or weakness.   PSYCHIATRIC: Denies depression, anxiety or mood swings.       PE:   Vitals: BP (!) 122/90   Wt 85.5 kg (188 lb 7.9 oz)   LMP 05/17/2010   BMI 31.37 kg/m²   APPEARANCE: Well nourished, well developed, in no acute distress.  SKIN: Normal skin turgor, no lesions.  CHEST: Lungs clear to auscultation.  HEART: Regular rate and rhythm, no murmurs, rubs or gallops.  ABDOMEN: Soft. No tenderness or masses. No hepatosplenomegaly. No hernias.  BREASTS: Symmetrical, no skin changes or visible lesions. No palpable masses, nipple discharge or adenopathy bilaterally.  PELVIC: Normal external female genitalia without lesions. Normal hair distribution. Adequate perineal body, normal urethral  meatus. Atrophic vagina without lesions or discharge. Cervix pink and without lesions. No significant cystocele or rectocele. Bimanual exam showed uterus normal size, shape, position, mobile and nontender. Adnexa without masses or tenderness. Urethra and bladder normal.  EXTREMITIES: No clubbing cyanosis or edema.      AP  Routine gyn  -s/p normal breast exam: mammogram ordered  -s/p normal pelvic exam:   -Pap and HPV: collected  -STD testing: declined  -colonoscopy: not completed  -hot flashes; given h/o DVT - will try brisdelle  -urge incontinence: urine cx sent; rx for ditropan provided    F/u in 8 weeks     daylin maki MD

## 2019-06-23 LAB — BACTERIA UR CULT: NORMAL

## 2019-06-25 ENCOUNTER — TELEPHONE (OUTPATIENT)
Dept: OBSTETRICS AND GYNECOLOGY | Facility: CLINIC | Age: 54
End: 2019-06-25

## 2019-06-25 NOTE — TELEPHONE ENCOUNTER
Pharmacy faxed in informing us that Brisdelle 7.5 mg capsules are not covered by pt's plan. Preferred alternative along with directionsare: Paroxetinehcl,fluoxetinehcl,escitalopramoxalate,sertralinehcl,citalopramhbr,venlafaxinehclplar. Please advise

## 2019-06-25 NOTE — TELEPHONE ENCOUNTER
Patient was encouraged to use a coupon for brisdelle. I think she was given one at the office. She was made aware that brisdelle may not be covered. There are no alternatives.    We also spoke about OTC meds like black cohosh and estroven.      Dr maki

## 2019-06-25 NOTE — TELEPHONE ENCOUNTER
----- Message from Carroll Palma MD sent at 6/24/2019  6:23 PM CDT -----  Inform patient: urine cx negative for infection    daylin palma MD

## 2019-06-27 LAB
HPV HR 12 DNA CVX QL NAA+PROBE: NEGATIVE
HPV16 AG SPEC QL: NEGATIVE
HPV18 DNA SPEC QL NAA+PROBE: NEGATIVE

## 2020-05-13 DIAGNOSIS — Z12.11 COLON CANCER SCREENING: ICD-10-CM

## 2020-08-17 ENCOUNTER — TELEPHONE (OUTPATIENT)
Dept: RHEUMATOLOGY | Facility: CLINIC | Age: 55
End: 2020-08-17

## 2020-08-20 DIAGNOSIS — Z12.39 BREAST CANCER SCREENING: ICD-10-CM

## 2020-08-27 RX ORDER — HYDROXYCHLOROQUINE SULFATE 200 MG/1
200 TABLET, FILM COATED ORAL DAILY
Qty: 60 TABLET | Refills: 0 | Status: SHIPPED | OUTPATIENT
Start: 2020-08-27 | End: 2020-09-26

## 2020-08-31 ENCOUNTER — TELEPHONE (OUTPATIENT)
Dept: RHEUMATOLOGY | Facility: CLINIC | Age: 55
End: 2020-08-31

## 2020-08-31 NOTE — TELEPHONE ENCOUNTER
Per Dr Harrison's request I called the patient to schedule a follow up appointment for medication since the patient has not been seen since 10/2019 but the phone went to voice mail and was unable to leave a message due to mail box being full

## 2020-09-18 ENCOUNTER — OFFICE VISIT (OUTPATIENT)
Dept: OBSTETRICS AND GYNECOLOGY | Facility: CLINIC | Age: 55
End: 2020-09-18
Payer: COMMERCIAL

## 2020-09-18 VITALS — WEIGHT: 199.5 LBS | BODY MASS INDEX: 33.2 KG/M2

## 2020-09-18 DIAGNOSIS — Z12.4 CERVICAL CANCER SCREENING: ICD-10-CM

## 2020-09-18 DIAGNOSIS — N39.41 URGE INCONTINENCE OF URINE: ICD-10-CM

## 2020-09-18 DIAGNOSIS — Z01.419 ROUTINE GYNECOLOGICAL EXAMINATION: Primary | ICD-10-CM

## 2020-09-18 PROCEDURE — 99999 PR PBB SHADOW E&M-EST. PATIENT-LVL III: CPT | Mod: PBBFAC,,, | Performed by: OBSTETRICS & GYNECOLOGY

## 2020-09-18 PROCEDURE — 99999 PR PBB SHADOW E&M-EST. PATIENT-LVL III: ICD-10-PCS | Mod: PBBFAC,,, | Performed by: OBSTETRICS & GYNECOLOGY

## 2020-09-18 PROCEDURE — 88175 CYTOPATH C/V AUTO FLUID REDO: CPT

## 2020-09-18 PROCEDURE — 3008F PR BODY MASS INDEX (BMI) DOCUMENTED: ICD-10-PCS | Mod: CPTII,S$GLB,, | Performed by: OBSTETRICS & GYNECOLOGY

## 2020-09-18 PROCEDURE — 3008F BODY MASS INDEX DOCD: CPT | Mod: CPTII,S$GLB,, | Performed by: OBSTETRICS & GYNECOLOGY

## 2020-09-18 PROCEDURE — 99396 PR PREVENTIVE VISIT,EST,40-64: ICD-10-PCS | Mod: S$GLB,,, | Performed by: OBSTETRICS & GYNECOLOGY

## 2020-09-18 PROCEDURE — 99396 PREV VISIT EST AGE 40-64: CPT | Mod: S$GLB,,, | Performed by: OBSTETRICS & GYNECOLOGY

## 2020-09-18 RX ORDER — IBUPROFEN 600 MG/1
TABLET ORAL
COMMUNITY
End: 2023-03-21

## 2020-09-18 RX ORDER — AZATHIOPRINE 50 MG/1
TABLET ORAL
COMMUNITY
End: 2021-10-19

## 2020-09-18 RX ORDER — LOSARTAN POTASSIUM 25 MG/1
25 TABLET ORAL
COMMUNITY

## 2020-09-18 RX ORDER — MYCOPHENOLATE MOFETIL 250 MG/1
CAPSULE ORAL
COMMUNITY

## 2020-09-18 RX ORDER — CODEINE PHOSPHATE AND GUAIFENESIN 10; 100 MG/5ML; MG/5ML
SOLUTION ORAL
COMMUNITY

## 2020-09-18 RX ORDER — METHYLPREDNISOLONE 4 MG/1
TABLET ORAL
COMMUNITY
Start: 2020-08-03

## 2020-09-18 RX ORDER — ONDANSETRON 4 MG/1
TABLET, ORALLY DISINTEGRATING ORAL
COMMUNITY

## 2020-09-18 RX ORDER — LOSARTAN POTASSIUM 100 MG/1
TABLET ORAL
COMMUNITY
End: 2021-10-19 | Stop reason: SDUPTHER

## 2020-09-18 RX ORDER — METHYLPREDNISOLONE 4 MG/1
TABLET ORAL
COMMUNITY

## 2020-09-18 RX ORDER — NEOMYCIN/BACITRACIN/POLYMYXINB 3.5-400-5K
OINTMENT (GRAM) TOPICAL
COMMUNITY

## 2020-09-18 RX ORDER — CALCITRIOL 0.25 UG/1
CAPSULE ORAL
COMMUNITY
Start: 2019-11-26 | End: 2021-10-19

## 2020-09-18 RX ORDER — PREDNISONE 10 MG/1
TABLET ORAL
COMMUNITY

## 2020-09-18 RX ORDER — BUTALBITAL, ACETAMINOPHEN AND CAFFEINE 50; 325; 40 MG/1; MG/1; MG/1
TABLET ORAL
COMMUNITY
Start: 2020-06-09 | End: 2021-10-19

## 2020-09-18 RX ORDER — HYDROCODONE BITARTRATE AND ACETAMINOPHEN 5; 325 MG/1; MG/1
TABLET ORAL
COMMUNITY

## 2020-09-18 RX ORDER — CEFDINIR 300 MG/1
CAPSULE ORAL
COMMUNITY
End: 2021-10-19

## 2020-09-18 RX ORDER — FUROSEMIDE 20 MG/1
1 TABLET ORAL
COMMUNITY
Start: 2019-12-02

## 2020-09-18 NOTE — PROGRESS NOTES
56 yo postmenopausal female who presents for routine gyn visit.  Reports no cycles since 2010.   No longer having sex because it's too painful.   Patient reports h/o DVT about 5 yrs ago.  She continues to complain if urinary problems. Wearing depends causes her to have skin irritation.  No h/o abl mammograms. Per patient, last mammogram was at [facility].  Patient to be placed on kidney transplant list.   Wants pap smear today.    ROS:  GENERAL: Denies weight gain or weight loss. Feeling well overall.   SKIN: Denies rash or lesions.   HEAD: Denies head injury or headache.   CHEST: Denies chest pain or shortness of breath.   CARDIOVASCULAR: Denies palpitations or left sided chest pain.   ABDOMEN: No abdominal pain, constipation, diarrhea, nausea, vomiting or rectal bleeding.   URINARY: No frequency, dysuria, hematuria, or burning on urination. incontinence  REPRODUCTIVE: hot flashes, vaginal dryness.   BREASTS: denies pain, lumps, or nipple discharge.   HEMATOLOGIC: No easy bruisability or excessive bleeding.   MUSCULOSKELETAL: Denies joint pain or swelling.   NEUROLOGIC: Denies syncope or weakness.   PSYCHIATRIC: Denies depression, anxiety or mood swings.       PE:   Vitals: Wt 90.5 kg (199 lb 8.3 oz)   LMP 05/17/2010   BMI 33.20 kg/m²   APPEARANCE: Well nourished, well developed, in no acute distress.  ABDOMEN: Soft. No tenderness or masses. No hepatosplenomegaly. No hernias.  BREASTS: Symmetrical, no skin changes or visible lesions. No palpable masses, nipple discharge or adenopathy bilaterally.  PELVIC: Normal external female genitalia without lesions. Normal hair distribution. Adequate perineal body, normal urethral meatus. Atrophic vagina without lesions or discharge. Cervix pink and without lesions. No significant cystocele or rectocele. Bimanual exam showed uterus normal size, shape, position, mobile and nontender. Adnexa without masses or tenderness. Urethra and bladder normal.  EXTREMITIES: No clubbing  cyanosis or edema.      AP  Routine gyn  -s/p normal breast exam: mammogram uptodate  -s/p normal pelvic exam:   -Pap collected  -STD testing: declined  -colonoscopy: to be completed  -urinary concerns: referred to urogyn    daylin maki MD

## 2020-09-18 NOTE — PATIENT INSTRUCTIONS
Urogynecology   [phone] [facility], [facility] [address]   1) BANG Noel, Nelia  2) Radha Bailey  3) Deven Saenz

## 2020-09-21 ENCOUNTER — TELEPHONE (OUTPATIENT)
Dept: UROGYNECOLOGY | Facility: CLINIC | Age: 55
End: 2020-09-21

## 2020-09-21 NOTE — TELEPHONE ENCOUNTER
----- Message from Nelia Noel MD sent at 9/18/2020  7:37 PM CDT -----  Regarding: RE: urogyn referral  Fabiola: Can you please call patient and help her make appt to see Dr. Chavarria or myself?  Referral from Dr. Palma.   Thanks!  ----- Message -----  From: Shawn Palma MD  Sent: 9/18/2020   4:47 PM CDT  To: Nelia Noel MD, [facility], DO  Subject: urogyn referral                                  Here is a referral for you.    Having problems with her urine. Using depends.  Ready to speak to someone about management.    shawn

## 2020-09-28 ENCOUNTER — TELEPHONE (OUTPATIENT)
Dept: UROGYNECOLOGY | Facility: CLINIC | Age: 55
End: 2020-09-28

## 2020-10-04 LAB
FINAL PATHOLOGIC DIAGNOSIS: NORMAL
Lab: NORMAL

## 2020-10-06 NOTE — PROGRESS NOTES
Send letter: pap is normal    Your pelvic exam will still need to occur once a year!    See you then!    Dr maki

## 2020-12-09 ENCOUNTER — TELEPHONE (OUTPATIENT)
Dept: OBSTETRICS AND GYNECOLOGY | Facility: CLINIC | Age: 55
End: 2020-12-09

## 2020-12-09 NOTE — TELEPHONE ENCOUNTER
----- Message from Aby Goldberg sent at 12/9/2020  1:54 PM CST -----  Contact: [phone]  Type:  Needs Medical Advice    Who Called: PT  Reason for Call: pt would like to speak with the nurse regarding her Pap Results from 9/18, pt is having her Kidney Transplant Procedure and they need the Results. Fax to [phone] Margarette Jamil at [facility]  Would the patient rather a call back or a response via [facility]?  Call back  Best Call Back Number: [phone]

## 2020-12-09 NOTE — TELEPHONE ENCOUNTER
----- Message from Francoise Harris sent at 12/9/2020 12:52 PM CST -----  Contact: [phone]  Who Called: PT  Regarding: mammo results   Would the patient rather a call back or a response via [facility]? Call back  Best Call Back Number: [phone]  Additional Information:

## 2020-12-10 ENCOUNTER — TELEPHONE (OUTPATIENT)
Dept: OBSTETRICS AND GYNECOLOGY | Facility: CLINIC | Age: 55
End: 2020-12-10

## 2020-12-10 NOTE — TELEPHONE ENCOUNTER
----- Message from Emigdio Wick sent at 12/10/2020  9:53 AM CST -----  Contact: patient/  [phone]  Patient calling requesting to speak with you regarding test results   PATIENT STATES THIS IS HER 2ND CALL   Please advise

## 2020-12-10 NOTE — TELEPHONE ENCOUNTER
----- Message from Emigdio Wick sent at 12/10/2020  9:53 AM CST -----  Contact: patient/  [phone]  Patient calling requesting to speak with you regarding test results   PATIENT STATES THIS IS HER 2ND CALL   Please advise

## 2021-03-03 ENCOUNTER — IMMUNIZATION (OUTPATIENT)
Dept: PRIMARY CARE CLINIC | Facility: CLINIC | Age: 56
End: 2021-03-03
Payer: COMMERCIAL

## 2021-03-03 DIAGNOSIS — Z23 NEED FOR VACCINATION: Primary | ICD-10-CM

## 2021-03-03 PROCEDURE — 0001A PR IMMUNIZ ADMIN, SARS-COV-2 COVID-19 VACC, 30MCG/0.3ML, 1ST DOSE: CPT | Mod: CV19,S$GLB,, | Performed by: INTERNAL MEDICINE

## 2021-03-03 PROCEDURE — 91300 PR SARS-COV- 2 COVID-19 VACCINE, NO PRSV, 30MCG/0.3ML, IM: ICD-10-PCS | Mod: S$GLB,,, | Performed by: INTERNAL MEDICINE

## 2021-03-03 PROCEDURE — 0001A PR IMMUNIZ ADMIN, SARS-COV-2 COVID-19 VACC, 30MCG/0.3ML, 1ST DOSE: ICD-10-PCS | Mod: CV19,S$GLB,, | Performed by: INTERNAL MEDICINE

## 2021-03-03 PROCEDURE — 91300 PR SARS-COV- 2 COVID-19 VACCINE, NO PRSV, 30MCG/0.3ML, IM: CPT | Mod: S$GLB,,, | Performed by: INTERNAL MEDICINE

## 2021-03-03 RX ADMIN — Medication 0.3 ML: at 04:03

## 2021-03-26 ENCOUNTER — IMMUNIZATION (OUTPATIENT)
Dept: PRIMARY CARE CLINIC | Facility: CLINIC | Age: 56
End: 2021-03-26
Payer: COMMERCIAL

## 2021-03-26 DIAGNOSIS — Z23 NEED FOR VACCINATION: Primary | ICD-10-CM

## 2021-03-26 PROCEDURE — 91300 PR SARS-COV- 2 COVID-19 VACCINE, NO PRSV, 30MCG/0.3ML, IM: CPT | Mod: S$GLB,,, | Performed by: INTERNAL MEDICINE

## 2021-03-26 PROCEDURE — 91300 PR SARS-COV- 2 COVID-19 VACCINE, NO PRSV, 30MCG/0.3ML, IM: ICD-10-PCS | Mod: S$GLB,,, | Performed by: INTERNAL MEDICINE

## 2021-03-26 PROCEDURE — 0002A PR IMMUNIZ ADMIN, SARS-COV-2 COVID-19 VACC, 30MCG/0.3ML, 2ND DOSE: ICD-10-PCS | Mod: CV19,S$GLB,, | Performed by: INTERNAL MEDICINE

## 2021-03-26 PROCEDURE — 0002A PR IMMUNIZ ADMIN, SARS-COV-2 COVID-19 VACC, 30MCG/0.3ML, 2ND DOSE: CPT | Mod: CV19,S$GLB,, | Performed by: INTERNAL MEDICINE

## 2021-03-26 RX ADMIN — Medication 0.3 ML: at 11:03

## 2021-10-19 ENCOUNTER — OFFICE VISIT (OUTPATIENT)
Dept: GASTROENTEROLOGY | Facility: CLINIC | Age: 56
End: 2021-10-19
Payer: COMMERCIAL

## 2021-10-19 VITALS
DIASTOLIC BLOOD PRESSURE: 90 MMHG | HEART RATE: 71 BPM | WEIGHT: 201.06 LBS | BODY MASS INDEX: 32.31 KG/M2 | SYSTOLIC BLOOD PRESSURE: 153 MMHG | HEIGHT: 66 IN

## 2021-10-19 DIAGNOSIS — K21.9 GASTROESOPHAGEAL REFLUX DISEASE, UNSPECIFIED WHETHER ESOPHAGITIS PRESENT: ICD-10-CM

## 2021-10-19 DIAGNOSIS — R10.13 EPIGASTRIC PAIN: Primary | ICD-10-CM

## 2021-10-19 PROCEDURE — 1160F PR REVIEW ALL MEDS BY PRESCRIBER/CLIN PHARMACIST DOCUMENTED: ICD-10-PCS | Mod: CPTII,S$GLB,, | Performed by: FAMILY MEDICINE

## 2021-10-19 PROCEDURE — 99204 PR OFFICE/OUTPT VISIT, NEW, LEVL IV, 45-59 MIN: ICD-10-PCS | Mod: S$GLB,,, | Performed by: FAMILY MEDICINE

## 2021-10-19 PROCEDURE — 3077F SYST BP >= 140 MM HG: CPT | Mod: CPTII,S$GLB,, | Performed by: FAMILY MEDICINE

## 2021-10-19 PROCEDURE — 3080F DIAST BP >= 90 MM HG: CPT | Mod: CPTII,S$GLB,, | Performed by: FAMILY MEDICINE

## 2021-10-19 PROCEDURE — 1159F PR MEDICATION LIST DOCUMENTED IN MEDICAL RECORD: ICD-10-PCS | Mod: CPTII,S$GLB,, | Performed by: FAMILY MEDICINE

## 2021-10-19 PROCEDURE — 99999 PR PBB SHADOW E&M-EST. PATIENT-LVL IV: CPT | Mod: PBBFAC,,, | Performed by: FAMILY MEDICINE

## 2021-10-19 PROCEDURE — 1159F MED LIST DOCD IN RCRD: CPT | Mod: CPTII,S$GLB,, | Performed by: FAMILY MEDICINE

## 2021-10-19 PROCEDURE — 3008F BODY MASS INDEX DOCD: CPT | Mod: CPTII,S$GLB,, | Performed by: FAMILY MEDICINE

## 2021-10-19 PROCEDURE — 1160F RVW MEDS BY RX/DR IN RCRD: CPT | Mod: CPTII,S$GLB,, | Performed by: FAMILY MEDICINE

## 2021-10-19 PROCEDURE — 3008F PR BODY MASS INDEX (BMI) DOCUMENTED: ICD-10-PCS | Mod: CPTII,S$GLB,, | Performed by: FAMILY MEDICINE

## 2021-10-19 PROCEDURE — 3080F PR MOST RECENT DIASTOLIC BLOOD PRESSURE >= 90 MM HG: ICD-10-PCS | Mod: CPTII,S$GLB,, | Performed by: FAMILY MEDICINE

## 2021-10-19 PROCEDURE — 99204 OFFICE O/P NEW MOD 45 MIN: CPT | Mod: S$GLB,,, | Performed by: FAMILY MEDICINE

## 2021-10-19 PROCEDURE — 3077F PR MOST RECENT SYSTOLIC BLOOD PRESSURE >= 140 MM HG: ICD-10-PCS | Mod: CPTII,S$GLB,, | Performed by: FAMILY MEDICINE

## 2021-10-19 PROCEDURE — 4010F PR ACE/ARB THEARPY RXD/TAKEN: ICD-10-PCS | Mod: CPTII,S$GLB,, | Performed by: FAMILY MEDICINE

## 2021-10-19 PROCEDURE — 4010F ACE/ARB THERAPY RXD/TAKEN: CPT | Mod: CPTII,S$GLB,, | Performed by: FAMILY MEDICINE

## 2021-10-19 PROCEDURE — 99999 PR PBB SHADOW E&M-EST. PATIENT-LVL IV: ICD-10-PCS | Mod: PBBFAC,,, | Performed by: FAMILY MEDICINE

## 2021-10-19 RX ORDER — PANTOPRAZOLE SODIUM 40 MG/1
40 TABLET, DELAYED RELEASE ORAL DAILY
Qty: 90 TABLET | Refills: 0 | Status: SHIPPED | OUTPATIENT
Start: 2021-10-19 | End: 2024-01-30

## 2022-01-18 ENCOUNTER — HOSPITAL ENCOUNTER (OUTPATIENT)
Dept: RADIOLOGY | Facility: HOSPITAL | Age: 57
Discharge: HOME | End: 2022-01-18
Attending: FAMILY MEDICINE
Payer: COMMERCIAL

## 2022-01-18 DIAGNOSIS — M53.3 DISORDER OF SACRUM: ICD-10-CM

## 2022-01-18 PROCEDURE — 72100 X-RAY EXAM L-S SPINE 2/3 VWS: CPT | Mod: 26,,, | Performed by: RADIOLOGY

## 2022-01-18 PROCEDURE — 72100 X-RAY EXAM L-S SPINE 2/3 VWS: CPT | Mod: TC,PN

## 2022-01-18 PROCEDURE — 72100 XR LUMBAR SPINE 2 OR 3 VIEWS: ICD-10-PCS | Mod: 26,,, | Performed by: RADIOLOGY

## 2022-02-23 DIAGNOSIS — D84.9 IMMUNOSUPPRESSED STATUS: ICD-10-CM

## 2022-03-30 ENCOUNTER — TELEPHONE (OUTPATIENT)
Dept: OBSTETRICS AND GYNECOLOGY | Facility: CLINIC | Age: 57
End: 2022-03-30
Payer: MEDICAID

## 2022-03-30 NOTE — TELEPHONE ENCOUNTER
----- Message from Evelyn Bryant Patient Care Assistant sent at 3/30/2022 12:12 PM CDT -----  Type:  Sooner Apoointment Request    Caller is requesting a sooner appointment.  Caller declined first available appointment listed below.  Caller will not accept being placed on the waitlist and is requesting a message be sent to doctor.  Name of Caller: pt  When is the first available appointment? 05/18  Symptoms: annual visit   Would the patient rather a call back or a response via [facility]?  call  Best Call Back Number: [phone]  Additional Information:  can you please schedule after 3pm

## 2022-08-23 ENCOUNTER — OFFICE VISIT (OUTPATIENT)
Dept: OBSTETRICS AND GYNECOLOGY | Facility: CLINIC | Age: 57
End: 2022-08-23
Payer: COMMERCIAL

## 2022-08-23 VITALS
SYSTOLIC BLOOD PRESSURE: 126 MMHG | DIASTOLIC BLOOD PRESSURE: 74 MMHG | HEIGHT: 66 IN | BODY MASS INDEX: 31.11 KG/M2 | WEIGHT: 193.56 LBS

## 2022-08-23 DIAGNOSIS — Z12.4 CERVICAL CANCER SCREENING: ICD-10-CM

## 2022-08-23 DIAGNOSIS — N95.2 ATROPHIC VAGINITIS: ICD-10-CM

## 2022-08-23 DIAGNOSIS — Z01.419 ROUTINE GYNECOLOGICAL EXAMINATION: Primary | ICD-10-CM

## 2022-08-23 DIAGNOSIS — Z12.31 SCREENING MAMMOGRAM FOR HIGH-RISK PATIENT: ICD-10-CM

## 2022-08-23 PROCEDURE — 3074F SYST BP LT 130 MM HG: CPT | Mod: CPTII,S$GLB,, | Performed by: OBSTETRICS & GYNECOLOGY

## 2022-08-23 PROCEDURE — 3078F PR MOST RECENT DIASTOLIC BLOOD PRESSURE < 80 MM HG: ICD-10-PCS | Mod: CPTII,S$GLB,, | Performed by: OBSTETRICS & GYNECOLOGY

## 2022-08-23 PROCEDURE — 4010F ACE/ARB THERAPY RXD/TAKEN: CPT | Mod: CPTII,S$GLB,, | Performed by: OBSTETRICS & GYNECOLOGY

## 2022-08-23 PROCEDURE — 99396 PREV VISIT EST AGE 40-64: CPT | Mod: S$GLB,,, | Performed by: OBSTETRICS & GYNECOLOGY

## 2022-08-23 PROCEDURE — 99999 PR PBB SHADOW E&M-EST. PATIENT-LVL III: ICD-10-PCS | Mod: PBBFAC,,, | Performed by: OBSTETRICS & GYNECOLOGY

## 2022-08-23 PROCEDURE — 99396 PR PREVENTIVE VISIT,EST,40-64: ICD-10-PCS | Mod: S$GLB,,, | Performed by: OBSTETRICS & GYNECOLOGY

## 2022-08-23 PROCEDURE — 3074F PR MOST RECENT SYSTOLIC BLOOD PRESSURE < 130 MM HG: ICD-10-PCS | Mod: CPTII,S$GLB,, | Performed by: OBSTETRICS & GYNECOLOGY

## 2022-08-23 PROCEDURE — 1159F MED LIST DOCD IN RCRD: CPT | Mod: CPTII,S$GLB,, | Performed by: OBSTETRICS & GYNECOLOGY

## 2022-08-23 PROCEDURE — 4010F PR ACE/ARB THEARPY RXD/TAKEN: ICD-10-PCS | Mod: CPTII,S$GLB,, | Performed by: OBSTETRICS & GYNECOLOGY

## 2022-08-23 PROCEDURE — 3008F BODY MASS INDEX DOCD: CPT | Mod: CPTII,S$GLB,, | Performed by: OBSTETRICS & GYNECOLOGY

## 2022-08-23 PROCEDURE — 3008F PR BODY MASS INDEX (BMI) DOCUMENTED: ICD-10-PCS | Mod: CPTII,S$GLB,, | Performed by: OBSTETRICS & GYNECOLOGY

## 2022-08-23 PROCEDURE — 1159F PR MEDICATION LIST DOCUMENTED IN MEDICAL RECORD: ICD-10-PCS | Mod: CPTII,S$GLB,, | Performed by: OBSTETRICS & GYNECOLOGY

## 2022-08-23 PROCEDURE — 99999 PR PBB SHADOW E&M-EST. PATIENT-LVL III: CPT | Mod: PBBFAC,,, | Performed by: OBSTETRICS & GYNECOLOGY

## 2022-08-23 PROCEDURE — 88175 CYTOPATH C/V AUTO FLUID REDO: CPT | Performed by: OBSTETRICS & GYNECOLOGY

## 2022-08-23 PROCEDURE — 3078F DIAST BP <80 MM HG: CPT | Mod: CPTII,S$GLB,, | Performed by: OBSTETRICS & GYNECOLOGY

## 2022-08-23 RX ORDER — ESTRADIOL 10 UG/1
10 INSERT VAGINAL
Qty: 8 TABLET | Refills: 12 | Status: SHIPPED | OUTPATIENT
Start: 2022-08-25

## 2022-08-23 NOTE — PROGRESS NOTES
"58 yo postmenopausal female who presents for routine gyn visit.  Reports no cycles since 2010.   Not having much sex because it's too painful.  OTC lubricants aren't improving this problem.  Patient reports h/o DVT about 5 yrs ago.  No h/o abl mammograms. Per patient, mammograms completed at [facility].  Patient to be placed on kidney transplant list.  And now s/p kidney!!!  Wants pap smear today.    ROS:  GENERAL: Denies weight gain or weight loss. Feeling well overall.   SKIN: Denies rash or lesions.   HEAD: Denies head injury or headache.   CHEST: Denies chest pain or shortness of breath.   CARDIOVASCULAR: Denies palpitations or left sided chest pain.   ABDOMEN: No abdominal pain, constipation, diarrhea, nausea, vomiting or rectal bleeding.   URINARY: No frequency, dysuria, hematuria, or burning on urination.   REPRODUCTIVE: vaginal dryness.   BREASTS: denies pain, lumps, or nipple discharge.   HEMATOLOGIC: No easy bruisability or excessive bleeding.   MUSCULOSKELETAL: Denies joint pain or swelling.   NEUROLOGIC: Denies syncope or weakness.   PSYCHIATRIC: Denies depression, anxiety or mood swings.       PE:   Vitals: /74   Ht 5' 6" (1.676 m)   Wt 87.8 kg (193 lb 9 oz)   LMP 05/17/2010   BMI 31.24 kg/m²   APPEARANCE: Well nourished, well developed, in no acute distress.  ABDOMEN: Soft. No tenderness or masses. No hepatosplenomegaly. No hernias.  BREASTS: Symmetrical, no skin changes or visible lesions. No palpable masses, nipple discharge or adenopathy bilaterally.  PELVIC: Normal external female genitalia without lesions. Normal hair distribution. Adequate perineal body, normal urethral meatus. Atrophic vagina without lesions or discharge. Cervix pink and without lesions. No significant cystocele or rectocele. Bimanual exam showed uterus normal size, shape, position, mobile and nontender. Adnexa without masses or tenderness. Urethra and bladder normal.  EXTREMITIES: No clubbing cyanosis or " edema.      AP  Routine gyn  -s/p normal breast exam: mammogram uptodate  -s/p normal pelvic exam:   -Pap collected  -STD testing: declined  -colonoscopy: uptodate  -atrophic vagina: discussed estrogen replacement vaginally; ok with trying vagifem twice a week; informed that risks of DVT increased with estrogen based products.      daylin maki MD

## 2022-08-24 ENCOUNTER — TELEPHONE (OUTPATIENT)
Dept: OBSTETRICS AND GYNECOLOGY | Facility: CLINIC | Age: 57
End: 2022-08-24
Payer: COMMERCIAL

## 2022-08-24 DIAGNOSIS — Z12.31 VISIT FOR SCREENING MAMMOGRAM: Primary | ICD-10-CM

## 2022-08-24 NOTE — TELEPHONE ENCOUNTER
----- Message from Tye Diana sent at 8/24/2022  9:25 AM CDT -----  Contact: pt  .Type:  Mammogram    Caller is requesting to schedule their annual mammogram appointment.  Order is not listed in EPIC.  Please enter order and contact patient to schedule.  Name of Caller:pt  Where would they like the mammogram performed?[facility]  Would the patient rather a call back or a response via [facility]? Call back  Best Call Back Number:[phone]  Additional Information:Pt. Is ready for her annual mammogram

## 2022-08-25 ENCOUNTER — TELEPHONE (OUTPATIENT)
Dept: ADMINISTRATIVE | Facility: OTHER | Age: 57
End: 2022-08-25
Payer: COMMERCIAL

## 2022-08-29 ENCOUNTER — TELEPHONE (OUTPATIENT)
Dept: OBSTETRICS AND GYNECOLOGY | Facility: CLINIC | Age: 57
End: 2022-08-29
Payer: COMMERCIAL

## 2022-08-29 NOTE — TELEPHONE ENCOUNTER
----- Message from Kayy Cruz sent at 8/29/2022  9:04 AM CDT -----  Regarding: call back  Contact: [phone]  Who called: PT    Patient is calling to talk to nurse in regards to see if her Mammo orders can be sent to [facility] Mammo department. Please advice

## 2022-08-29 NOTE — TELEPHONE ENCOUNTER
----- Message from Kayy Cruz sent at 8/29/2022  9:04 AM CDT -----  Regarding: call back  Contact: [phone]  Who called: PT    Patient is calling to talk to nurse in regards to see if her Mammo orders can be sent to [facility] Mammo department. Please advice

## 2022-08-31 LAB
FINAL PATHOLOGIC DIAGNOSIS: NORMAL
Lab: NORMAL

## 2022-09-07 ENCOUNTER — TELEPHONE (OUTPATIENT)
Dept: OBSTETRICS AND GYNECOLOGY | Facility: CLINIC | Age: 57
End: 2022-09-07
Payer: COMMERCIAL

## 2022-09-07 NOTE — TELEPHONE ENCOUNTER
----- Message from Carroll Palma MD sent at 9/7/2022  9:51 AM CDT -----  Regarding: mammogram  Inform the patient that we received a copy of her mammogram from [facility] from 9/1/2022.    The mammogram shows that all is normal!    Dr palma

## 2022-09-07 NOTE — TELEPHONE ENCOUNTER
Inform the patient that we received a copy of her mammogram from [facility] from 9/1/2022.    The mammogram shows that all is normal!    Dr maki

## 2023-03-10 ENCOUNTER — OFFICE VISIT (OUTPATIENT)
Dept: URGENT CARE | Facility: CLINIC | Age: 58
End: 2023-03-10
Payer: COMMERCIAL

## 2023-03-10 VITALS
TEMPERATURE: 99 F | SYSTOLIC BLOOD PRESSURE: 131 MMHG | BODY MASS INDEX: 32.02 KG/M2 | HEIGHT: 67 IN | HEART RATE: 69 BPM | WEIGHT: 204 LBS | DIASTOLIC BLOOD PRESSURE: 86 MMHG | OXYGEN SATURATION: 90 %

## 2023-03-10 DIAGNOSIS — Z02.6 ENCOUNTER RELATED TO WORKER'S COMPENSATION CLAIM: Primary | ICD-10-CM

## 2023-03-10 DIAGNOSIS — S80.01XA CONTUSION OF RIGHT KNEE, INITIAL ENCOUNTER: ICD-10-CM

## 2023-03-10 PROCEDURE — 99204 OFFICE O/P NEW MOD 45 MIN: CPT | Mod: S$GLB,,, | Performed by: PHYSICIAN ASSISTANT

## 2023-03-10 PROCEDURE — 80305 OOH COLLECTION ONLY DRUG SCREEN: ICD-10-PCS | Mod: S$GLB,,, | Performed by: PHYSICIAN ASSISTANT

## 2023-03-10 PROCEDURE — 99204 PR OFFICE/OUTPT VISIT, NEW, LEVL IV, 45-59 MIN: ICD-10-PCS | Mod: S$GLB,,, | Performed by: PHYSICIAN ASSISTANT

## 2023-03-10 PROCEDURE — 73562 XR KNEE 3 VIEW RIGHT: ICD-10-PCS | Mod: RT,S$GLB,, | Performed by: RADIOLOGY

## 2023-03-10 PROCEDURE — 80305 DRUG TEST PRSMV DIR OPT OBS: CPT | Mod: S$GLB,,, | Performed by: PHYSICIAN ASSISTANT

## 2023-03-10 PROCEDURE — 73562 X-RAY EXAM OF KNEE 3: CPT | Mod: RT,S$GLB,, | Performed by: RADIOLOGY

## 2023-03-10 RX ORDER — LIDOCAINE 50 MG/G
1 PATCH TOPICAL DAILY
Qty: 7 PATCH | Refills: 0 | Status: SHIPPED | OUTPATIENT
Start: 2023-03-10 | End: 2023-03-17

## 2023-03-10 NOTE — LETTER
[facility]  [address] [place] [address]  Phone: [phone]  Fax: [phone]  [facility]: [id number]    Pt Name: Talya Santos  Injury Date: 03/10/2023   Employee ID: [id number] Date of First Treatment: 03/10/2023   Company: [employer]      Appointment Time: 09:30 AM Arrived: 9:43 AM   Provider: Kaleigh Lutz PA-C Time Out: 11:23 AM     Office Treatment:   1. Encounter related to worker's compensation claim    2. Contusion of right knee, initial encounter      Medications Ordered This Encounter   Medications    LIDOcaine (LIDODERM) 5 %      Patient Instructions: Elevated affected area, Apply ice 24-48 hours then apply heat/warm soaks      Restrictions: Sedentary work only, Sit down work only, Home today     Return Appointment: 3/14/2023 at 9:45 AM

## 2023-03-10 NOTE — PROGRESS NOTES
Subjective:       Patient ID: Talya Santos is a 58 y.o. female.    Chief Complaint: Knee Pain    Ms. Santos presents for evaluation of right knee injury, DOI 3/10/23.  She is a  for the [employer].  She was pulling a mop bucket out of the closet and it hit her in the knee.  She reports some swelling afterwards and is having pain with walking.  She denies any clicking, popping, grinding.  The knee is not giving out her.  She has no history injury or surgery to the right knee.  She has not taken any medication or tried anything for her symptoms.    See MA Note below.  WC, New Visit (DOI 3/10/23) The patient is  with the [employer]. She was pulling the mop bucket out of  the closet when the back of it hit her the Right knee. She has complaints of soreness, swelling and is having trouble walking. She does not know if there is any bruising or if the skin is broken since she has not had a chance to look at it. The patient does not have history of injuries or surgeries in the knee. She has not taken any medication for it or applied anything to it. Current pain score 8/10. LRC     Constitution: Positive for activity change.   HENT: Negative.     Neck: neck negative.   Cardiovascular: Negative.    Eyes: Negative.    Respiratory: Negative.     Gastrointestinal: Negative.    Endocrine: negative.   Genitourinary: Negative.    Musculoskeletal:  Positive for pain, trauma and joint pain.   Neurological:  Positive for tingling. Negative for numbness.      Objective:      Physical Exam  Vitals and nursing note reviewed.   Constitutional:       General: She is not in acute distress.     Appearance: Normal appearance. She is not ill-appearing.   HENT:      Head: Normocephalic and atraumatic.      Right Ear: Tympanic membrane, ear canal and external ear normal. There is no impacted cerumen.      Left Ear: Tympanic membrane, ear canal and external ear normal. There is no impacted cerumen.      Nose: Nose normal. No  congestion or rhinorrhea.      Mouth/Throat:      Mouth: Mucous membranes are moist.      Pharynx: No oropharyngeal exudate or posterior oropharyngeal erythema.   Eyes:      Extraocular Movements: Extraocular movements intact.      Conjunctiva/sclera: Conjunctivae normal.      Pupils: Pupils are equal, round, and reactive to light.   Cardiovascular:      Rate and Rhythm: Normal rate and regular rhythm.      Pulses: Normal pulses.      Heart sounds: Normal heart sounds.   Pulmonary:      Effort: Pulmonary effort is normal.      Breath sounds: Normal breath sounds.   Abdominal:      General: Bowel sounds are normal.      Palpations: Abdomen is soft.   Musculoskeletal:         General: Normal range of motion.      Cervical back: Normal range of motion.      Right knee: Swelling present. No deformity, effusion, erythema, ecchymosis, lacerations or crepitus. Tenderness present. No LCL laxity, MCL laxity, ACL laxity or PCL laxity.      Instability Tests: Anterior drawer test negative. Posterior drawer test negative.        Legs:       Comments: TTP and mild edema overlying patella.  FROM with extension.  Flexion limited to 60 degrees due to pain.    No pain with varus or valgus stress.  Antalgic gait.  RLE is NVI.   Skin:     General: Skin is warm.      Capillary Refill: Capillary refill takes less than 2 seconds.   Neurological:      General: No focal deficit present.      Mental Status: She is alert and oriented to person, place, and time.   Psychiatric:         Mood and Affect: Mood normal.         Behavior: Behavior normal.         Thought Content: Thought content normal.         Judgment: Judgment normal.       XR R knee - The skeletal structures are intact.  No fracture, dislocation, or joint effusion is seen.  Knee joint spaces are satisfactory without significant cartilage loss.  Mild soft tissue swelling may be present at the anterior aspect of the knee.    Assessment:       1. Encounter related to worker's  compensation claim    2. Contusion of right knee, initial encounter          Plan:       XR negative for fracture.  ACE wrap, tylenol PRN, elevate, ice.  Home today & light duty until follow up.      Medications Ordered This Encounter   Medications    LIDOcaine (LIDODERM) 5 %     Sig: Place 1 patch onto the skin once daily. Remove & Discard patch within 12 hours or as directed by MD for 7 days     Dispense:  7 patch     Refill:  0     Patient Instructions: Elevated affected area, Apply ice 24-48 hours then apply heat/warm soaks   Restrictions: Sedentary work only, Sit down work only, Home today  Follow up in about 4 days (around 3/14/2023).

## 2023-03-14 ENCOUNTER — OFFICE VISIT (OUTPATIENT)
Dept: URGENT CARE | Facility: CLINIC | Age: 58
End: 2023-03-14
Payer: COMMERCIAL

## 2023-03-14 VITALS
BODY MASS INDEX: 32.02 KG/M2 | TEMPERATURE: 98 F | SYSTOLIC BLOOD PRESSURE: 128 MMHG | OXYGEN SATURATION: 93 % | WEIGHT: 204 LBS | HEART RATE: 65 BPM | DIASTOLIC BLOOD PRESSURE: 75 MMHG | HEIGHT: 67 IN

## 2023-03-14 DIAGNOSIS — Z02.6 ENCOUNTER RELATED TO WORKER'S COMPENSATION CLAIM: Primary | ICD-10-CM

## 2023-03-14 DIAGNOSIS — S80.01XD CONTUSION OF RIGHT KNEE, SUBSEQUENT ENCOUNTER: ICD-10-CM

## 2023-03-14 PROCEDURE — 99213 PR OFFICE/OUTPT VISIT, EST, LEVL III, 20-29 MIN: ICD-10-PCS | Mod: S$GLB,,,

## 2023-03-14 PROCEDURE — 99213 OFFICE O/P EST LOW 20 MIN: CPT | Mod: S$GLB,,,

## 2023-03-14 NOTE — PROGRESS NOTES
Subjective:       Patient ID: Talya Santos is a 58 y.o. female.    Chief Complaint: Knee Pain (Right )    WC, RV (DOI 3/10/23) The patient is  with the [employer]. She is here for a follow up on the Right knee. She states the knee is getting better but she still has pain in the knee ans his on her feet most of the day. She states it is till stiff but does not cause her any trouble walking. She tried to get the RX filled because they needed the  ID no. She only takes Tylenol. She also applies hot and cold compresses.  Current pain score 5/10. LRC     Constitution: Negative.   HENT: Negative.     Neck: neck negative.   Eyes: Negative.    Respiratory: Negative.     Endocrine: negative.   Genitourinary: Negative.    Musculoskeletal:  Positive for pain, trauma and abnormal ROM of joint.   Skin: Negative.       Objective:      Physical Exam  Vitals and nursing note reviewed.   Constitutional:       General: She is not in acute distress.  HENT:      Head: Normocephalic.   Eyes:      Conjunctiva/sclera: Conjunctivae normal.   Musculoskeletal:      Cervical back: No pain with movement.      Right knee: Swelling present. No deformity, erythema, ecchymosis or bony tenderness. Normal range of motion. Tenderness present over the medial joint line. No LCL laxity or MCL laxity. Normal meniscus and normal patellar mobility.      Instability Tests: Anterior drawer test negative. Medial Raeann test negative and lateral Raeann test negative.        Legs:       Comments: Mild swelling to the anterior aspect of the right knee.  No overlying erythema or bruising noted.  Mild tenderness to the anterior right patella to medial aspect of the right knee.  Full range of motion of the right knee but pain at the upper ranges of motion of flexion.  No ligamentous laxity noted on examination but pain when MCL stressed.  She is able to climb on and off the examination table without difficulty or assistance.   Skin:     General:  Skin is warm.      Capillary Refill: Capillary refill takes less than 2 seconds.   Neurological:      General: No focal deficit present.      Mental Status: She is alert and oriented to person, place, and time.   Psychiatric:         Attention and Perception: Attention normal.         Mood and Affect: Mood and affect normal.         Speech: Speech normal.         Behavior: Behavior normal. Behavior is cooperative.       Assessment:       1. Encounter related to worker's compensation claim    2. Contusion of right knee, subsequent encounter          Plan:       Some symptomatic improvement since her last evaluation.  I did advise her that she may have difficulty obtaining the lidocaine patches prescription as this medication is now available over-the-counter.  Avoid use of anti-inflammatory medications since she has had a kidney transplant.  She may continue the use of Tylenol and use of cold packs periodically to help with her symptoms.  I anticipate gradual improvement of her symptoms over time and will have her return in 1 week for reassessment.         Restrictions: Sit or stand as needed, Sedentary work only  Follow up in about 1 week (around 3/21/2023).

## 2023-03-14 NOTE — LETTER
[facility]  [address] [address]  Phone: [phone]  Fax: [phone]  [facility]: [id number]    Pt Name: Talya Henson Date: 03/10/2023   Employee ID: [id number] Date of  Treatment: 03/14/2023   Company: [employer]      Appointment Time: 09:45 AM Arrived: 9:25 AM    Provider: Barrington Prieto, DO Time Out: 10:21 AM      Office Treatment:   1. Encounter related to worker's compensation claim    2. Contusion of right knee, subsequent encounter               Restrictions: Sit or stand as needed, Sedentary work only     Return Appointment: 3/21/2023 at 9:15 AM JUAN MANUEL

## 2023-03-21 ENCOUNTER — OFFICE VISIT (OUTPATIENT)
Dept: URGENT CARE | Facility: CLINIC | Age: 58
End: 2023-03-21
Payer: COMMERCIAL

## 2023-03-21 VITALS
SYSTOLIC BLOOD PRESSURE: 101 MMHG | OXYGEN SATURATION: 96 % | WEIGHT: 204 LBS | HEART RATE: 75 BPM | TEMPERATURE: 99 F | BODY MASS INDEX: 32.02 KG/M2 | HEIGHT: 67 IN | DIASTOLIC BLOOD PRESSURE: 63 MMHG

## 2023-03-21 DIAGNOSIS — S80.01XD CONTUSION OF RIGHT KNEE, SUBSEQUENT ENCOUNTER: ICD-10-CM

## 2023-03-21 DIAGNOSIS — Z02.6 ENCOUNTER RELATED TO WORKER'S COMPENSATION CLAIM: Primary | ICD-10-CM

## 2023-03-21 PROCEDURE — 99213 PR OFFICE/OUTPT VISIT, EST, LEVL III, 20-29 MIN: ICD-10-PCS | Mod: S$GLB,,,

## 2023-03-21 PROCEDURE — 99213 OFFICE O/P EST LOW 20 MIN: CPT | Mod: S$GLB,,,

## 2023-03-21 RX ORDER — DICLOFENAC SODIUM 10 MG/G
2 GEL TOPICAL 4 TIMES DAILY
Qty: 150 G | Refills: 1 | Status: SHIPPED | OUTPATIENT
Start: 2023-03-21 | End: 2023-06-01 | Stop reason: SDUPTHER

## 2023-03-21 NOTE — LETTER
[facility]  [address] [address]  Phone: [phone]  Fax: [phone]  [facility]: [id number]    Pt Name: Talya Bates  Injury Date: 03/10/2023   Employee ID:  Date of Treatment: 03/21/2023   Company: [employer]      Appointment Time: 09:30 AM Arrived: 08:59 AM   Provider: Barrington Prieto, DO Time Out:09:50 AM     Office Treatment:   1. Encounter related to worker's compensation claim    2. Contusion of right knee, subsequent encounter      Medications Ordered This Encounter   Medications    diclofenac sodium (VOLTAREN) 1 % Gel      Patient Instructions: Apply ice 24-48 hours then apply heat/warm soaks, Attention not to aggravate affected area, PT to be scheduled once authorized      Restrictions: Sit or stand as needed, Avoid climbing/kneeling/squatting (No climbing/descending stairs)     Return Appointment:   3/28/2023 at 10:45 AM     SH

## 2023-03-21 NOTE — PROGRESS NOTES
Subjective:       Patient ID: Talya Bates is a 58 y.o. female.    Chief Complaint: Knee Pain    See MA note.  She describes pain to/around her right patella area with stairs.  She also describes discomfort but not pain to the same area with prolonged sitting.  She does experience some fatigue with prolonged walking activities.  Symptoms are unchanged since her last evaluation.  She can not take oral anti-inflammatory medications due to her kidney transplant but she does acknowledge use of low-dose oral steroid.  She is inquired about a prescription of Tylenol with codeine.    Patient's place of employment - [employer]  Patient's job title -   Date of Injury - DOI 3/10/23  Body part injured - Right knee  Current work status per last visit - Full time  Improved, same, or worse - Same  Pain Scale right now (1-10) -  6    Patient reports pain while walking up stairs. She only takes Tylenol as needed. LRC     Constitution: Negative.   HENT: Negative.     Neck: neck negative.   Cardiovascular: Negative.    Eyes: Negative.    Respiratory: Negative.     Gastrointestinal: Negative.    Endocrine: negative.   Genitourinary: Negative.    Musculoskeletal:  Positive for pain, trauma, joint pain, joint swelling and abnormal ROM of joint.   Skin: Negative.       Objective:      Physical Exam  Vitals and nursing note reviewed.   Constitutional:       General: She is not in acute distress.  HENT:      Head: Normocephalic.   Eyes:      Conjunctiva/sclera: Conjunctivae normal.   Musculoskeletal:      Cervical back: No pain with movement.      Right knee: No swelling, deformity or bony tenderness. Decreased range of motion. Tenderness present. No LCL laxity, MCL laxity or ACL laxity. Normal alignment and normal meniscus.      Instability Tests: Anterior drawer test negative. Medial Raeann test negative and lateral Raeann test negative.      Left knee: Normal. No swelling or deformity.      Right lower leg: Normal.  No swelling.        Legs:       Comments: No gross deformity noted to right knee when compared to the left.  No overlying skin changes such as erythema or bruising.  Mild tenderness on palpation to the anterior aspect of the right knee over the patella.  No crepitus noted with passive movement of the patella.  No ligamentous laxity noted examination.  She is slightly decreased flexion mobility and painful at the upper ranges of motion.  She is able to fully extend her right knee without difficulty.  Negative Raeann evaluation albeit some pain with this motion.  She ambulates with a slightly antalgic gait   Skin:     General: Skin is warm.      Capillary Refill: Capillary refill takes less than 2 seconds.   Neurological:      General: No focal deficit present.      Mental Status: She is alert and oriented to person, place, and time.      Gait: Gait is intact.   Psychiatric:         Attention and Perception: Attention normal.         Mood and Affect: Mood normal. Affect is flat.         Speech: Speech normal.         Behavior: Behavior is cooperative.         Thought Content: Thought content normal.       Assessment:       1. Encounter related to worker's compensation claim    2. Contusion of right knee, subsequent encounter          Plan:       Clinically, there has been no change in her examination since last evaluation.  Pain seems to be aggravated by stairs and prolonged walking.  Therefore I will add no climbing stairs to her work limitations.  Although she can not take oral anti-inflammatories, I will prescribe topical Voltaren gel to see if this will help with her knee symptoms.  In addition I will add physical therapy to her treatment regimen.  Although she is continued symptoms, she is not in acute distress that would warrant the use of codeine based medication.  She is aware to continue to use cold packs or ice as needed to help with her knee symptoms.  Re-evaluate in approximately 1 week.    Medications  Ordered This Encounter   Medications    diclofenac sodium (VOLTAREN) 1 % Gel     Sig: Apply 2 g topically 4 (four) times daily.     Dispense:  150 g     Refill:  1     Patient Instructions: Apply ice 24-48 hours then apply heat/warm soaks, Attention not to aggravate affected area, PT to be scheduled once authorized   Restrictions: Sit or stand as needed, Avoid climbing/kneeling/squatting (No climbing/descending stairs)  Follow up in about 1 week (around 3/28/2023).

## 2023-03-30 ENCOUNTER — CLINICAL SUPPORT (OUTPATIENT)
Dept: REHABILITATION | Facility: HOSPITAL | Age: 58
End: 2023-03-30
Payer: COMMERCIAL

## 2023-03-30 DIAGNOSIS — G89.29 CHRONIC RIGHT SHOULDER PAIN: ICD-10-CM

## 2023-03-30 DIAGNOSIS — M25.511 CHRONIC RIGHT SHOULDER PAIN: ICD-10-CM

## 2023-03-30 PROCEDURE — 97110 THERAPEUTIC EXERCISES: CPT

## 2023-03-30 PROCEDURE — 97161 PT EVAL LOW COMPLEX 20 MIN: CPT

## 2023-03-30 NOTE — PLAN OF CARE
"[facility]  Physical Therapy Initial Evaluation  [place] 1st Floor    Name: Talya Bates  Clinic Number: [id number]    Therapy Diagnosis:   Encounter Diagnosis   Name Primary?    Chronic right shoulder pain      Physician: Barrington Prieto DO    Physician Orders: PT Eval and Treat   Medical Diagnosis from Referral: Contusion of right knee, subsequent encounter   Evaluation Date: 3/30/2023  Authorization Period Expiration: 2023   Plan of Care Expiration: 23  Visit # / Visits authorized:     FOTO: 40%  FOTO 1st follow up:   FOTO 2nd follow up:     Time In: 200 pm  Time Out: 253 pm  Total Billable Time: 53 minutes    Precautions: Standard and Diabetes,     Subjective   Date of onset:    History of current condition - Talya reports: "a bucket ran into my knee." There was swelling. Denies clicking or popping, denies buckling. It swells up when she is on it too long. As long as she is off of it and has it elevated it is better. Only taking iron and tylenol and heating pad. Denies pain with bending an straightening. Her pain is ONLY when she walks a lot or climbs up stairs. She states she cannot climb up stairs. She would rather be at [place] because she lives             Past Medical History:   Diagnosis Date    Acid reflux     Alopecia     Anemia     Anxiety     Arthritis     Blood transfusion     Chronic kidney disease     Deep vein thrombosis     left leg; completed anticoagulation; seen by heme/onc    Dry eyes     Dry mouth     Lupus      Talya Bates  has a past surgical history that includes  section; Tubal ligation; Skin surgery; and kidney transplant.    Talya has a current medication list which includes the following prescription(s): diclofenac sodium, estradiol, furosemide, guaifenesin-codeine 100-10 mg/5 ml, hydrocodone-acetaminophen, losartan, melatonin-pyridoxine (vit b6), methylprednisolone, methylprednisolone, " mycophenolate, ondansetron, oxybutynin, pantoprazole, prednisone, and sodium bicarbonate.    Review of patient's allergies indicates:   Allergen Reactions    Ciprofloxacin Hives    Avelox [moxifloxacin] Rash    Iodinated contrast media Itching and Swelling    Reglan [metoclopramide hcl] Other (See Comments)     Nervous and paranoid    Amoxicillin Edema     Face and lips became swollen with rash.        Imaging:   XRAY (R KNEE): The skeletal structures are intact.  No fracture, dislocation, or joint effusion is seen.  Knee joint spaces are satisfactory without significant cartilage loss.  Mild soft tissue swelling may be present at the anterior aspect of the knee.     Prior Therapy: none   Social History: was walking 2 miles around gym prior to injury   Occupation: [employer]  Prior Level of Function: no pain - walking 2 miles before gym   Current Level of Function: LIMPING, PAIN WITH WALKING/STAIRS    Pain:  Current 7/10, worst 7/10, best 7/10   Location: R KNEE  Description: throbbing and aching   Aggravating Factors: walking a lot and stairs   Easing Factors: heating pad and tylenol     Pts goals: pain-free    Objective     Observation: limping, mid foot contact decreased knee flexion and extension    Range of Motion (Active):   Knee Right  Left    Flexion 100 125   Extension 5 hyper 5 hyper       Lower Extremity Strength   Right LE Left LE   Quadriceps: 3/5 3/5   Hamstrings: 3/5 3/5   Hip flexion (supine): 3/5 3/5   Hip extension:  3/5 3-/5   PGM: 3/5 3-/5   Hip ER:  3-/5 3/5   Hip IR: 3/5 3/5     Special Tests:   Right   Valgus Stress Test  Pain but -   Varus Stress test -   Lachman's test -   Posterior Lachman -   Klaudia's Test + with click   Thessaly's Test nt   Patellar Grind Test -     Joint Mobility: patellar mobility normal, fat pad normal    Palpation: med>lat joint line; pes anserine    Sensation: light touch in tact     Edema:   Girth Measurement Joint line 15 cm below 10 cm above  "  Right 46.5 cm 40 cm pants    Left 46.5 cm 40 cm pants       CMS Impairment/Limitation/Restriction for FOTO KNEE Survey    Therapist reviewed FOTO scores for Talya Bates on 3/30/2023.   FOTO documents entered into [facility] - see Media section.    Limitation Score: 40%       TREATMENT   Treatment Time In: 230 pm  Treatment Time Out: 250 pm  Total Treatment time separate from Evaluation: 20 minutes    Education  LAQ 10 x 3"  Quad Set 3" x 10  Heel slides w/ strap/pillowcase   Supine 10x   Seated       Home Exercises and Patient Education Provided    Education provided about:   - PT POC   - PT goals   - exercises/HEP      Written Home Exercises Provided:   Exercises were reviewed and Talya was able to demonstrate them prior to the end of the session.   Pt received a written copy of exercises to perform at home. Talya demonstrated good understanding of the education provided.     See EMR under patient instructions for exercises given.   Assessment   Talya is a 58 y.o. female referred to outpatient Physical Therapy with a medical diagnosis of Contusion of right knee, subsequent encounter. Pt presents with decreased R knee ROM, joint  line tenderness and pes anserine, (+) brandon's, (+) pain with overpressure/flexion, and gait deficits.     Pt prognosis is Good.   Pt will benefit from skilled outpatient Physical Therapy to address the deficits stated above and in the chart below, provide pt/family education, and to maximize pt's level of independence.     Plan of care discussed with patient: Yes  Pt's spiritual, cultural and educational needs considered and patient is agreeable to the plan of care and goals as stated below:     Anticipated Barriers for therapy: work    Medical Necessity is demonstrated by the following  History  Co-morbidities and personal factors that may impact the plan of care Co-morbidities:   See chart    Personal Factors:   age   HIGH   Examination  Body Structures and Functions, activity " limitations and participation restrictions that may impact the plan of care Body Regions:   lower extremities    Body Systems:    gross symmetry  ROM  strength  gross coordinated movement  balance  gait  transfers  transitions  motor control  motor learning    Participation Restrictions:   None identified    Activity limitations:   no deficits    General Tasks and Commands  no deficits    Communication  no deficits    Mobility  walking    Self care  no deficits    Domestic Life  no deficits    Interactions/Relationships  no deficits    Life Areas  no deficits    Community and Social Life  no deficits         Low   Clinical Presentation stable and uncomplicated Low   Decision Making/ Complexity Score: Low     Goals:  Short Term Goals:  4 weeks  1.Report decreased R knee pain < / =  8/10  to increase tolerance for walking  2. Increase PROM R knee   3. Increased strength by 1/3 MMT grade in R knee to increase tolerance for ADL and work activities.  4. Pt to tolerate HEP to improve ROM and independence with ADL's    Long Term Goals: 8 weeks  1.Report decreased R knee pain  < / =  2 /10  to increase tolerance for WALKING  2.Increase AROM to WNL R KNEE  3.Increase strength to >/= 4/5 in R LE  to increase tolerance for ADL and work activities.  4. Pt goal: pain-free walking/stairs  5. Pt will have improved gcode of CJ (20-40% limited) on FOTO shoulder in order to demonstrate true functional improvement.       Plan   Plan of care Certification: 3/30/2023 to 6/30/23    Outpatient Physical Therapy 1-2 times weekly for 8 weeks to include the following interventions: Gait Training, Manual Therapy, Moist Heat/ Ice, Neuromuscular Re-ed, Patient Education, Therapeutic Activites, Therapeutic Exercise, and Functional Dry Needling with/or without Electrical Stimulation as needed.     Ruby Blanco, PT, DPT

## 2023-03-31 ENCOUNTER — OFFICE VISIT (OUTPATIENT)
Dept: URGENT CARE | Facility: CLINIC | Age: 58
End: 2023-03-31
Payer: COMMERCIAL

## 2023-03-31 VITALS
HEIGHT: 67 IN | RESPIRATION RATE: 18 BRPM | TEMPERATURE: 98 F | OXYGEN SATURATION: 97 % | BODY MASS INDEX: 32.02 KG/M2 | DIASTOLIC BLOOD PRESSURE: 77 MMHG | SYSTOLIC BLOOD PRESSURE: 122 MMHG | WEIGHT: 204 LBS | HEART RATE: 66 BPM

## 2023-03-31 DIAGNOSIS — Z02.6 ENCOUNTER RELATED TO WORKER'S COMPENSATION CLAIM: ICD-10-CM

## 2023-03-31 DIAGNOSIS — S80.01XD CONTUSION OF RIGHT KNEE, SUBSEQUENT ENCOUNTER: Primary | ICD-10-CM

## 2023-03-31 PROCEDURE — 99213 OFFICE O/P EST LOW 20 MIN: CPT | Mod: S$GLB,,, | Performed by: NURSE PRACTITIONER

## 2023-03-31 PROCEDURE — 99213 PR OFFICE/OUTPT VISIT, EST, LEVL III, 20-29 MIN: ICD-10-PCS | Mod: S$GLB,,, | Performed by: NURSE PRACTITIONER

## 2023-03-31 NOTE — LETTER
[facility]  [address] [address]  Phone: [phone]  Fax: [phone]  [facility]: [email or web address]    Pt Name: Talya Bates  Injury Date: 03/10/2023   Employee ID: [id number] Date of First Treatment: 03/31/2023   Company: [employer]      Appointment Time: 09:15 AM Arrived: 9:12am   Provider: Drea Mosquera NP Time Out: 11:10am     Office Treatment:   1. Contusion of right knee, subsequent encounter    2. Encounter related to worker's compensation claim          Patient Instructions: Attention not to aggravate affected area, Daily home exercises/warm soaks, Continue Physical Therapy (May alternate ice and heat 15 min.)    Restrictions: Sit or stand as needed, No Prolonged standing/walking, Avoid climbing/kneeling/squatting (No climbing or descending stairs.)     Return Appointment: 4/14/2023 at 9:15am

## 2023-03-31 NOTE — PROGRESS NOTES
Subjective:      Patient ID: Talya Bates is a 58 y.o. female.    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  Has been using the Voltaren gel and says that her pain level has decreased a little.  Reports that she has been doing her regular job but takes her time.  Prolonged walking and stair climbing aggravate the knee pain. MWT    Knee Injury  Associated symptoms include arthralgias, joint swelling and myalgias. Pertinent negatives include no numbness.   Musculoskeletal:  Positive for pain, joint pain, joint swelling, abnormal ROM of joint and muscle ache. Negative for trauma and muscle cramps.   Skin:  Negative for erythema and bruising.   Neurological:  Negative for numbness and tingling.   Objective:     Physical Exam  Constitutional:       General: She is not in acute distress.     Appearance: Normal appearance.   HENT:      Right Ear: External ear normal.      Left Ear: External ear normal.   Eyes:      Conjunctiva/sclera: Conjunctivae normal.   Cardiovascular:      Pulses: Normal pulses.   Pulmonary:      Effort: Pulmonary effort is normal.   Musculoskeletal:      Right knee: No swelling, deformity, effusion, erythema or ecchymosis. Decreased range of motion. Tenderness present. No LCL laxity, MCL laxity, ACL laxity or PCL laxity. Normal meniscus and normal patellar mobility. Normal pulse.        Legs:       Comments: Pain to R anterior knee. No significant swelling. Pain with both flexion and extension. NV intact distally.   Skin:     General: Skin is warm and dry.      Findings: No erythema.   Neurological:      General: No focal deficit present.      Mental Status: She is alert and oriented to person,  place, and time.   Psychiatric:         Mood and Affect: Mood normal.         Behavior: Behavior normal.         Thought Content: Thought content normal.         Judgment: Judgment normal.      Assessment:      1. Contusion of right knee, subsequent encounter    2. Encounter related to worker's compensation claim      Plan:   Patient will continue to use Voltaren gel as directed.  She also may use over-the-counter Tylenol as needed for pain.    Instructed to call Physical therapy to set up her appointment times if she does not hear from them in the next few days.       Patient Instructions: Attention not to aggravate affected area, Daily home exercises/warm soaks, Continue Physical Therapy (May alternate ice and heat 15 min.)   Restrictions: Sit or stand as needed, No Prolonged standing/walking, Avoid climbing/kneeling/squatting (No climbing or descending stairs.)  Follow up in about 2 weeks (around 4/14/2023).

## 2023-04-05 ENCOUNTER — CLINICAL SUPPORT (OUTPATIENT)
Dept: REHABILITATION | Facility: HOSPITAL | Age: 58
End: 2023-04-05
Payer: COMMERCIAL

## 2023-04-05 DIAGNOSIS — G89.29 CHRONIC PAIN OF RIGHT KNEE: Primary | ICD-10-CM

## 2023-04-05 DIAGNOSIS — M25.561 CHRONIC PAIN OF RIGHT KNEE: Primary | ICD-10-CM

## 2023-04-05 PROCEDURE — 97140 MANUAL THERAPY 1/> REGIONS: CPT | Mod: PN

## 2023-04-05 PROCEDURE — 97110 THERAPEUTIC EXERCISES: CPT | Mod: PN

## 2023-04-05 NOTE — PROGRESS NOTES
"[facility]   Physical Therapy Treatment Note     Name: Talya Bates  Clinic Number: [id number]    Therapy Diagnosis:   Encounter Diagnosis   Name Primary?    Chronic pain of right knee Yes     Physician: No ref. provider found    Visit Date: 4/5/2023    Physician Orders: PT Eval and Treat   Medical Diagnosis from Referral: Contusion of right knee, subsequent encounter   Evaluation Date: 3/30/2023  Authorization Period Expiration: 03/27/2023 03/20/2024   Plan of Care Expiration: 6/30/23  Visit # / Visits authorized: 2/12  PTA Visit #: 0/5   FOTO: 2/5    Time In: 2:05 PM  Time Out: 3:00 PM  Total Billable Time: 55 minutes (3 TE, 1 MT)    SUBJECTIVE     Pt reports: has been standing and walking a lot today and right knee is sore.  She was compliant with home exercise program.  Response to previous treatment: 1st after  Functional change: 1st after    Pain: 6/10  Location: right knee    OBJECTIVE     Objective Measures updated at progress report unless specified.     Treatment     Talya received the treatments listed below:      manual therapy techniques: Joint mobilizations, Manual traction, Myofacial release, Soft tissue Mobilization, and Friction Massage were applied to the: right knee for 10 minutes, including:  Right knee PROM with distraction in sitting  Right patellar mobs, grade II-III    therapeutic exercises to develop strength, endurance, ROM, flexibility, posture, and core stabilization for 45 minutes including:  LAQ 10 x 3"  Quad Set: 20x5'' holds, towel under knee  Straight leg raise: 2x8 right (small range)  Sidelying hip abduction: 2x10 right  Short arc quad: 30x, right  Supine knee flexion stretch with straps: 20x5'' holds    Long arc quad: 2x20 right  Seated heel slides: next    Leg press: 25x double leg, 3 plates, sled=4    Patient Education and Home Exercises     Home Exercises Provided and Patient Education Provided   Education provided:   - PT POC   - PT goals   - " exercises/HEP    Written Home Exercises Provided: yes. Exercises were reviewed and Talya was able to demonstrate them prior to the end of the session.  Talya demonstrated good  understanding of the education provided. See EMR under Patient Instructions for exercises provided during therapy sessions    ASSESSMENT     Talya is a 58 y.o. female referred to outpatient Physical Therapy with a medical diagnosis of Contusion of right knee, subsequent encounter. Pt presents with decreased R knee ROM, joint  line tenderness and pes anserine, (+) brandon's, (+) pain with overpressure/flexion, and gait deficits. Open chain flexion greater than closed chain flexion, improved right knee flexion overall after exercises and manual, and moderate right knee irritability overall. She ws encouraged to perform knee flexion exercises at home, and to take as many sitting breaks at work when possible to give leg a break.     Talya Is progressing well towards her goals.   Pt prognosis is Good.     Pt will continue to benefit from skilled outpatient physical therapy to address the deficits listed in the problem list box on initial evaluation, provide pt/family education and to maximize pt's level of independence in the home and community environment.     Pt's spiritual, cultural and educational needs considered and pt agreeable to plan of care and goals.     Anticipated barriers to physical therapy: work conditions    Goals:   Short Term Goals:  4 weeks  1.Report decreased R knee pain < / =  8/10  to increase tolerance for walking  2. Increase PROM R knee   3. Increased strength by 1/3 MMT grade in R knee to increase tolerance for ADL and work activities.  4. Pt to tolerate HEP to improve ROM and independence with ADL's     Long Term Goals: 8 weeks  1.Report decreased R knee pain  < / =  2 /10  to increase tolerance for WALKING  2.Increase AROM to WNL R KNEE  3.Increase strength to >/= 4/5 in R LE  to increase tolerance for ADL and  work activities.  4. Pt goal: pain-free walking/stairs  5. Pt will have improved gcode of CJ (20-40% limited) on FOTO shoulder in order to demonstrate true functional improvement.     PLAN     Con per POC.    Imer Stevens, PT

## 2023-04-10 ENCOUNTER — CLINICAL SUPPORT (OUTPATIENT)
Dept: REHABILITATION | Facility: HOSPITAL | Age: 58
End: 2023-04-10
Payer: COMMERCIAL

## 2023-04-10 DIAGNOSIS — G89.29 CHRONIC PAIN OF RIGHT KNEE: Primary | ICD-10-CM

## 2023-04-10 DIAGNOSIS — M25.561 CHRONIC PAIN OF RIGHT KNEE: Primary | ICD-10-CM

## 2023-04-10 PROCEDURE — 97110 THERAPEUTIC EXERCISES: CPT | Mod: PN,CQ

## 2023-04-10 NOTE — PROGRESS NOTES
"[facility]   Physical Therapy Treatment Note     Name: Talya Bates  Clinic Number: [id number]    Therapy Diagnosis:   Encounter Diagnosis   Name Primary?    Chronic pain of right knee Yes       Physician: Barrington Prieto DO    Visit Date: 4/10/2023    Physician Orders: PT Eval and Treat   Medical Diagnosis from Referral: Contusion of right knee, subsequent encounter   Evaluation Date: 3/30/2023  Authorization Period Expiration: 03/27/2023 03/20/2024   Plan of Care Expiration: 6/30/23  Visit # / Visits authorized: 3/12  PTA Visit #: 1/5   FOTO: 2/5    Time In: 2:05 PM  Time Out: 3:00 PM  Total Billable Time: 55 minutes (3 TE ) + 10 minutes MHP    SUBJECTIVE     Pt reports: Patient reports 6/10 pain this day reporting "its alright" Patient reports compliance with HEP reporting doing to 2x per day.   She was compliant with home exercise program.  Response to previous treatment: 1st after  Functional change: 1st after    Pain: 6/10  Location: right knee    OBJECTIVE     Objective Measures updated at progress report unless specified.     Treatment     Talya received the treatments listed below:      manual therapy techniques: Joint mobilizations, Manual traction, Myofacial release, Soft tissue Mobilization, and Friction Massage were applied to the: right knee for 00 minutes, including:  Right knee PROM with distraction in sitting  Right patellar mobs, grade II-III    therapeutic exercises to develop strength, endurance, ROM, flexibility, posture, and core stabilization for 45 minutes including:    LAQ 2x 10 x 3"  Quad Set: 20x5'' holds, towel under knee  Straight leg raise: 2x8 right (small range)  Sidelying hip abduction: 2x10 right  Short arc quad: 30x, right  Supine knee flexion stretch with straps: 20x5'' holds    Long arc quad: 2x20 right  Seated heel slides: next  Seated HS stretch 30" x2     Leg press: 30x double leg, 3 plates, sled=4    Modalities: 10 minutes MHP with 6 towel " layers x9 minutes for pain management before returning to work. At 9 minutes thomas, patient reports MHP was getting warm and it was immediately removed.     Patient Education and Home Exercises     Home Exercises Provided and Patient Education Provided   Education provided:   - PT POC   - PT goals   - exercises/HEP    Written Home Exercises Provided: yes. Exercises were reviewed and Talya was able to demonstrate them prior to the end of the session.  Talya demonstrated good  understanding of the education provided. See EMR under Patient Instructions for exercises provided during therapy sessions    ASSESSMENT     Talya is a 58 y.o. female referred to outpatient Physical Therapy with a medical diagnosis of Contusion of right knee, subsequent encounter. Improved knee extension ROM with LAQ post seated HS stretch     Talya Is progressing well towards her goals.   Pt prognosis is Good.     Pt will continue to benefit from skilled outpatient physical therapy to address the deficits listed in the problem list box on initial evaluation, provide pt/family education and to maximize pt's level of independence in the home and community environment.     Pt's spiritual, cultural and educational needs considered and pt agreeable to plan of care and goals.     Anticipated barriers to physical therapy: work conditions    Goals:   Short Term Goals:  4 weeks  1.Report decreased R knee pain < / =  8/10  to increase tolerance for walking  2. Increase PROM R knee   3. Increased strength by 1/3 MMT grade in R knee to increase tolerance for ADL and work activities.  4. Pt to tolerate HEP to improve ROM and independence with ADL's     Long Term Goals: 8 weeks  1.Report decreased R knee pain  < / =  2 /10  to increase tolerance for WALKING  2.Increase AROM to WNL R KNEE  3.Increase strength to >/= 4/5 in R LE  to increase tolerance for ADL and work activities.  4. Pt goal: pain-free walking/stairs  5. Pt will have improved gcode of CJ  (20-40% limited) on FOTO shoulder in order to demonstrate true functional improvement.     PLAN     Con per POC.    Too Hendricks, PTA

## 2023-04-14 ENCOUNTER — CLINICAL SUPPORT (OUTPATIENT)
Dept: REHABILITATION | Facility: HOSPITAL | Age: 58
End: 2023-04-14
Payer: COMMERCIAL

## 2023-04-14 ENCOUNTER — OFFICE VISIT (OUTPATIENT)
Dept: URGENT CARE | Facility: CLINIC | Age: 58
End: 2023-04-14
Payer: COMMERCIAL

## 2023-04-14 VITALS
OXYGEN SATURATION: 97 % | HEIGHT: 67 IN | RESPIRATION RATE: 18 BRPM | TEMPERATURE: 98 F | DIASTOLIC BLOOD PRESSURE: 72 MMHG | BODY MASS INDEX: 32.96 KG/M2 | SYSTOLIC BLOOD PRESSURE: 121 MMHG | WEIGHT: 210 LBS | HEART RATE: 72 BPM

## 2023-04-14 DIAGNOSIS — Z02.6 ENCOUNTER RELATED TO WORKER'S COMPENSATION CLAIM: ICD-10-CM

## 2023-04-14 DIAGNOSIS — M25.561 CHRONIC PAIN OF RIGHT KNEE: Primary | ICD-10-CM

## 2023-04-14 DIAGNOSIS — G89.29 CHRONIC PAIN OF RIGHT KNEE: Primary | ICD-10-CM

## 2023-04-14 DIAGNOSIS — S80.01XD CONTUSION OF RIGHT KNEE, SUBSEQUENT ENCOUNTER: Primary | ICD-10-CM

## 2023-04-14 PROCEDURE — 97140 MANUAL THERAPY 1/> REGIONS: CPT | Mod: PN,CQ

## 2023-04-14 PROCEDURE — 99213 PR OFFICE/OUTPT VISIT, EST, LEVL III, 20-29 MIN: ICD-10-PCS | Mod: S$GLB,,, | Performed by: NURSE PRACTITIONER

## 2023-04-14 PROCEDURE — 97110 THERAPEUTIC EXERCISES: CPT | Mod: PN,CQ

## 2023-04-14 PROCEDURE — 99213 OFFICE O/P EST LOW 20 MIN: CPT | Mod: S$GLB,,, | Performed by: NURSE PRACTITIONER

## 2023-04-14 NOTE — PROGRESS NOTES
"[facility]   Physical Therapy Treatment Note     Name: Talya Bates  Clinic Number: [id number]    Therapy Diagnosis:   Encounter Diagnosis   Name Primary?    Chronic pain of right knee Yes         Physician: Barrington Prieto DO    Visit Date: 4/14/2023    Physician Orders: PT Eval and Treat   Medical Diagnosis from Referral: Contusion of right knee, subsequent encounter   Evaluation Date: 3/30/2023  Authorization Period Expiration: 03/27/2023 03/20/2024   Plan of Care Expiration: 6/30/23  Visit # / Visits authorized: 3/12  PTA Visit #: 1/5   FOTO: 2/5    Time In: 2:05 PM  Time Out: 3:00 PM  Total Billable Time: 55 minutes (3 TE, 1MT )     SUBJECTIVE     Pt reports: Patient reports 7/10 pain in knee this day. Patient reports having appointment with MD prior who increased pain secondary to physical examination.   She was compliant with home exercise program.  Response to previous treatment: 1st after  Functional change: 1st after    Pain: 6/10  Location: right knee    OBJECTIVE     Objective Measures updated at progress report unless specified.     Treatment     Talya received the treatments listed below:      manual therapy techniques: Joint mobilizations, Manual traction, Myofacial release, Soft tissue Mobilization, and Friction Massage were applied to the: right knee for 8 minutes, including:  Right knee PROM extension   Right patellar mobs, grade II-III    therapeutic exercises to develop strength, endurance, ROM, flexibility, posture, and core stabilization for 45 minutes including:    Quad Set: 20x5'' holds, towel under knee  Straight leg raise: 2x8 right (small range)  Sidelying hip abduction: 3x10 right  Short arc quad: 30x, right  Supine knee flexion stretch with straps: 20x5'' holds    Long arc quad: 2x20 right  Seated heel slides: next  Seated HS stretch 30" x2     Cybex HC curl 4 plates 3x10   Leg press: 30x double leg, 3 plates +6 pounds, sled=4    Modalities: 10 minutes " MHP with 6 towel layers x9 minutes for pain management before returning to work. At 9 minutes thomas, patient reports MHP was getting warm and it was immediately removed.     Patient Education and Home Exercises     Home Exercises Provided and Patient Education Provided   Education provided:   - PT POC   - PT goals   - exercises/HEP    Written Home Exercises Provided: yes. Exercises were reviewed and Talya was able to demonstrate them prior to the end of the session.  Talya demonstrated good  understanding of the education provided. See EMR under Patient Instructions for exercises provided during therapy sessions    ASSESSMENT     Talya is a 58 y.o. female referred to outpatient Physical Therapy with a medical diagnosis of Contusion of right knee, subsequent encounter. Initiated Cybex HS curl for improved HS strength to progress towards goals with in PT POC.     Talya Is progressing well towards her goals.   Pt prognosis is Good.     Pt will continue to benefit from skilled outpatient physical therapy to address the deficits listed in the problem list box on initial evaluation, provide pt/family education and to maximize pt's level of independence in the home and community environment.     Pt's spiritual, cultural and educational needs considered and pt agreeable to plan of care and goals.     Anticipated barriers to physical therapy: work conditions    Goals:   Short Term Goals:  4 weeks  1.Report decreased R knee pain < / =  8/10  to increase tolerance for walking  2. Increase PROM R knee   3. Increased strength by 1/3 MMT grade in R knee to increase tolerance for ADL and work activities.  4. Pt to tolerate HEP to improve ROM and independence with ADL's     Long Term Goals: 8 weeks  1.Report decreased R knee pain  < / =  2 /10  to increase tolerance for WALKING  2.Increase AROM to WNL R KNEE  3.Increase strength to >/= 4/5 in R LE  to increase tolerance for ADL and work activities.  4. Pt goal: pain-free  walking/stairs  5. Pt will have improved gcode of CJ (20-40% limited) on FOTO shoulder in order to demonstrate true functional improvement.     PLAN     Con per POC.    Too Hendricks, PTA

## 2023-04-14 NOTE — PROGRESS NOTES
Subjective:      Patient ID: Talya Bates is a 58 y.o. female.    Chief Complaint: Knee Injury (right)    Patient's place of employment - [employer]  Patient's job title -   Date of Injury - 03/10/2023  Body part injured - right knee  Current work status per last visit - Regular duty  Improved, same, or worse - Better  Pain Scale right now (1-10) -  7    Patient reports that the swelling and pain have decreased.  She has been to 2 physical therapy sessions thus far and feels like it is helping.  She continues to work light duty.  Has been using the Voltaren gel with some relief.  Now complaining of some tightness to the posterior calf since therapy. MWT    Knee Injury  Associated symptoms include arthralgias, joint swelling and myalgias. Pertinent negatives include no numbness.     Musculoskeletal:  Positive for pain, joint pain, joint swelling, abnormal ROM of joint and muscle ache. Negative for trauma and muscle cramps.   Skin:  Negative for color change, erythema and bruising.   Neurological:  Negative for numbness and tingling.   Objective:     Physical Exam  Constitutional:       General: She is not in acute distress.     Appearance: Normal appearance.   Eyes:      Conjunctiva/sclera: Conjunctivae normal.   Cardiovascular:      Pulses: Normal pulses.   Pulmonary:      Effort: Pulmonary effort is normal.   Musculoskeletal:         General: Tenderness present. No swelling.      Right knee: Crepitus present. No swelling, deformity, effusion, erythema or ecchymosis. Decreased range of motion. Tenderness present. No LCL laxity, MCL laxity, ACL laxity or PCL laxity. Normal alignment and normal meniscus.      Instability Tests: Anterior drawer test negative. Posterior drawer test negative.        Legs:       Comments: Pain with flexion.  Some discomfort with Raeann's.  No significant swelling.  No heat to knee or calf.  No erythema.  Complains of tightness to right medial leg just distal to  knee.   Skin:     General: Skin is warm and dry.      Findings: No erythema.   Neurological:      General: No focal deficit present.      Mental Status: She is alert and oriented to person, place, and time.   Psychiatric:         Mood and Affect: Mood normal.         Behavior: Behavior normal.         Thought Content: Thought content normal.         Judgment: Judgment normal.      Assessment:      1. Contusion of right knee, subsequent encounter    2. Encounter related to worker's compensation claim      Plan:   Patient has Voltaren gel at home which she may continue to apply to right knee.  She will continue physical therapy as scheduled.       Patient Instructions: Attention not to aggravate affected area, Continue Physical Therapy, Daily home exercises/warm soaks   Restrictions: Sit or stand as needed, Avoid climbing/kneeling/squatting, No Prolonged standing/walking (No climbing or descending stairs.)  Follow up in about 2 weeks (around 4/28/2023).

## 2023-04-14 NOTE — LETTER
[facility]  [address] [address]  Phone: [phone]  Fax: [phone]  [facility] [facility]: [email or web address]    Pt Name: Talya Bates  Injury Date: 03/10/2023   Employee ID: [id number] Date of First Treatment: 04/14/2023   Company: [employer]      Appointment Time: 09:00 AM Arrived: 9:00am   Provider: Drea Mosquera NP Time Out: 9:50am     Office Treatment:   1. Contusion of right knee, subsequent encounter    2. Encounter related to worker's compensation claim          Patient Instructions: Attention not to aggravate affected area, Continue Physical Therapy, Daily home exercises/warm soaks    Restrictions: Sit or stand as needed, Avoid climbing/kneeling/squatting, No Prolonged standing/walking (No climbing or descending stairs.)     Return Appointment: 4/27/2023 at 9:00am

## 2023-04-17 ENCOUNTER — CLINICAL SUPPORT (OUTPATIENT)
Dept: REHABILITATION | Facility: HOSPITAL | Age: 58
End: 2023-04-17
Payer: COMMERCIAL

## 2023-04-17 DIAGNOSIS — G89.29 CHRONIC PAIN OF RIGHT KNEE: Primary | ICD-10-CM

## 2023-04-17 DIAGNOSIS — M25.561 CHRONIC PAIN OF RIGHT KNEE: Primary | ICD-10-CM

## 2023-04-17 PROCEDURE — 97110 THERAPEUTIC EXERCISES: CPT | Mod: PN

## 2023-04-17 PROCEDURE — 97140 MANUAL THERAPY 1/> REGIONS: CPT | Mod: PN

## 2023-04-17 NOTE — PROGRESS NOTES
"[facility]   Physical Therapy Treatment Note     Name: Talya Bates  Clinic Number: [id number]    Therapy Diagnosis:   Encounter Diagnosis   Name Primary?    Chronic pain of right knee Yes     Physician: Barrington Prieto DO    Visit Date: 4/17/2023    Physician Orders: PT Eval and Treat   Medical Diagnosis from Referral: Contusion of right knee, subsequent encounter   Evaluation Date: 3/30/2023  Authorization Period Expiration: 03/27/2023 03/20/2024   Plan of Care Expiration: 6/30/23  Visit # / Visits authorized: 4/12  PTA Visit #: 0/5   FOTO: 4/5    Time In: 905AM  Time Out: 1000AM  Total Billable Time: 55 minutes (3 TE, 1MT)    SUBJECTIVE     Pt reports: Patient reports less pain today as compared to last visit. Still with chief c/o anterior knee pain and tightness posteriorly. Reports most difficulties with standing for a long period of time or completing stairs (up and down).    She was compliant with home exercise program.  Response to previous treatment: 1st after  Functional change: 1st after    Pain: 6/10  Location: right knee    OBJECTIVE     Objective Measures updated at progress report unless specified.     Treatment     Talya received the treatments listed below:      manual therapy techniques: Joint mobilizations, Manual traction, Myofacial release, Soft tissue Mobilization, and Friction Massage were applied to the: right knee for 10 minutes, including:  Right knee PROM with distraction in sitting  Right patellar mobs, grade II-III    therapeutic exercises to develop strength, endurance, ROM, flexibility, posture, and core stabilization for 45 minutes including:    Straight leg raise: 2x10  Sidelying clamshells 3x10 RTB  Short arc quad: 3x10, large bolster 2#  Bridging 2x10  Supine knee flexion stretch with straps: 20x5'' holds    Long arc quad: 2x20 right; 2#  Seated heel slides: next  Seated HS stretch 30" x3    Standing R G/S stretch 3x30"    Cybex HC curl 4 plates " 3x10   Cybex knee extension 15# 2x10  Leg press: 30x double leg, 3 plates +6 pounds, sled=4    Modalities: 10 minutes MHP with 6 towel layers x9 minutes for pain management before returning to work. At 9 minutes thomas, patient reports MHP was getting warm and it was immediately removed.     Patient Education and Home Exercises     Home Exercises Provided and Patient Education Provided   Education provided:   - PT POC   - PT goals   - exercises/HEP    Written Home Exercises Provided: yes. Exercises were reviewed and Talya was able to demonstrate them prior to the end of the session.  Talya demonstrated good  understanding of the education provided. See EMR under Patient Instructions for exercises provided during therapy sessions    ASSESSMENT     Talya is a 58 y.o. female referred to outpatient Physical Therapy with a medical diagnosis of Contusion of right knee, subsequent encounter. Pt with improved R knee AROM flexion/extension as compared to initial evaluation and with (+) response to manual therapy interventions today with improved pre to post knee flexion AROM. Pt with increase in pain with primarily with closed chain/functional activities. Focusing on ROM, open > closed chain strengthening at this time. Progress as tolerated.     Talya Is progressing well towards her goals.   Pt prognosis is Good.     Pt will continue to benefit from skilled outpatient physical therapy to address the deficits listed in the problem list box on initial evaluation, provide pt/family education and to maximize pt's level of independence in the home and community environment.     Pt's spiritual, cultural and educational needs considered and pt agreeable to plan of care and goals.     Anticipated barriers to physical therapy: work conditions    Goals:   Short Term Goals:  4 weeks  1.Report decreased R knee pain < / =  8/10  to increase tolerance for walking  2. Increase PROM R knee   3. Increased strength by 1/3 MMT grade in R  knee to increase tolerance for ADL and work activities.  4. Pt to tolerate HEP to improve ROM and independence with ADL's     Long Term Goals: 8 weeks  1.Report decreased R knee pain  < / =  2 /10  to increase tolerance for WALKING  2.Increase AROM to WNL R KNEE  3.Increase strength to >/= 4/5 in R LE  to increase tolerance for ADL and work activities.  4. Pt goal: pain-free walking/stairs  5. Pt will have improved gcode of CJ (20-40% limited) on FOTO shoulder in order to demonstrate true functional improvement.     PLAN     Con per POC.    Bimal Cristina, PT

## 2023-04-19 ENCOUNTER — CLINICAL SUPPORT (OUTPATIENT)
Dept: REHABILITATION | Facility: HOSPITAL | Age: 58
End: 2023-04-19
Payer: COMMERCIAL

## 2023-04-19 DIAGNOSIS — M25.561 CHRONIC PAIN OF RIGHT KNEE: Primary | ICD-10-CM

## 2023-04-19 DIAGNOSIS — G89.29 CHRONIC PAIN OF RIGHT KNEE: Primary | ICD-10-CM

## 2023-04-19 PROCEDURE — 97140 MANUAL THERAPY 1/> REGIONS: CPT | Mod: PN,CQ

## 2023-04-19 PROCEDURE — 97110 THERAPEUTIC EXERCISES: CPT | Mod: PN,CQ

## 2023-04-19 NOTE — PROGRESS NOTES
"[facility]   Physical Therapy Treatment Note     Name: Talya Bates  Clinic Number: [id number]    Therapy Diagnosis:   Encounter Diagnosis   Name Primary?    Chronic pain of right knee Yes       Physician: Barringotn Prieto DO    Visit Date: 4/19/2023    Physician Orders: PT Eval and Treat   Medical Diagnosis from Referral: Contusion of right knee, subsequent encounter   Evaluation Date: 3/30/2023  Authorization Period Expiration: 03/27/2023 03/20/2024   Plan of Care Expiration: 6/30/23  Visit # / Visits authorized: 3/12  PTA Visit #: 1/5   FOTO: 2/5    Time In: 2:05 PM  Time Out: 3:00 PM  Total Billable Time: 55 minutes (3 TE ) + 10 minutes MHP    SUBJECTIVE     Pt reports: Patient reports less pain today as compared to last visit. Still with chief c/o anterior knee pain and tightness posteriorly. Reports most difficulties with standing for a long period of time or completing stairs (up and down).    She was compliant with home exercise program.  Response to previous treatment: 1st after  Functional change: 1st after    Pain: 6/10  Location: right knee    OBJECTIVE     Objective Measures updated at progress report unless specified.     Treatment     Talya received the treatments listed below:      manual therapy techniques: Joint mobilizations, Manual traction, Myofacial release, Soft tissue Mobilization, and Friction Massage were applied to the: right knee for 10 minutes, including:    STM and Rolling pin to R quad (emphasis distomedially)   Previous:  Right knee PROM with distraction in sitting  Right patellar mobs, grade II-III    therapeutic exercises to develop strength, endurance, ROM, flexibility, posture, and core stabilization for 45 minutes including:    Straight leg raise: 2x10  Sidelying clamshells 3x10 no TB, emphasis on form  Side lying hip ABD 2x10     Prone Quad stretch 3x 30"  Prone HS curl x15 3# (cramping in HS)     Long arc quad: 2x20 right; 2#  Seated heel " "slides: next  Seated HS stretch 30" x3    Cybex HC curl 4 plates 3x10   Cybex knee extension 15# 2x10  Leg press: 30x double leg, 3 plates +6 pounds, sled=4    Previous:     Short arc quad: 3x10, large bolster 2#  Bridging 2x10  Standing R G/S stretch 3x30"  Modalities: 10 minutes MHP with 6 towel layers x9 minutes for pain management before returning to work. At 9 minutes thomas, patient reports MHP was getting warm and it was immediately removed.     Patient Education and Home Exercises     Home Exercises Provided and Patient Education Provided   Education provided:   - PT POC   - PT goals   - exercises/HEP    Written Home Exercises Provided: yes. Exercises were reviewed and Talya was able to demonstrate them prior to the end of the session.  Talya demonstrated good  understanding of the education provided. See EMR under Patient Instructions for exercises provided during therapy sessions    ASSESSMENT     Talya is a 58 y.o. female referred to outpatient Physical Therapy with a medical diagnosis of Contusion of right knee, subsequent encounter. Initiated STM and rolling pin to R quad to reduce c/o tightness/tension limiting ability to perform SLR flexion. Initiated prone quad stretch for improved tissue extensibility and initiated HS curl for quad inhibition. Patient with cramping after 15 curls and had to rest until passed. Resistance reduced on clamshells due to compensation during clamshells in order to emphasize form.     Talya Is progressing well towards her goals.   Pt prognosis is Good.     Pt will continue to benefit from skilled outpatient physical therapy to address the deficits listed in the problem list box on initial evaluation, provide pt/family education and to maximize pt's level of independence in the home and community environment.     Pt's spiritual, cultural and educational needs considered and pt agreeable to plan of care and goals.     Anticipated barriers to physical therapy: work " conditions    Goals:   Short Term Goals:  4 weeks  1.Report decreased R knee pain < / =  8/10  to increase tolerance for walking  2. Increase PROM R knee   3. Increased strength by 1/3 MMT grade in R knee to increase tolerance for ADL and work activities.  4. Pt to tolerate HEP to improve ROM and independence with ADL's     Long Term Goals: 8 weeks  1.Report decreased R knee pain  < / =  2 /10  to increase tolerance for WALKING  2.Increase AROM to WNL R KNEE  3.Increase strength to >/= 4/5 in R LE  to increase tolerance for ADL and work activities.  4. Pt goal: pain-free walking/stairs  5. Pt will have improved gcode of CJ (20-40% limited) on FOTO shoulder in order to demonstrate true functional improvement.     PLAN     Con per POC.    Too Hendricks, PTA

## 2023-04-21 ENCOUNTER — CLINICAL SUPPORT (OUTPATIENT)
Dept: REHABILITATION | Facility: HOSPITAL | Age: 58
End: 2023-04-21
Payer: COMMERCIAL

## 2023-04-21 DIAGNOSIS — G89.29 CHRONIC PAIN OF RIGHT KNEE: Primary | ICD-10-CM

## 2023-04-21 DIAGNOSIS — M25.561 CHRONIC PAIN OF RIGHT KNEE: Primary | ICD-10-CM

## 2023-04-21 PROCEDURE — 97110 THERAPEUTIC EXERCISES: CPT | Mod: PN

## 2023-04-21 PROCEDURE — 97140 MANUAL THERAPY 1/> REGIONS: CPT | Mod: PN

## 2023-04-21 NOTE — PROGRESS NOTES
"[facility]   Physical Therapy Treatment Note     Name: Talya Bates  Clinic Number: [id number]    Therapy Diagnosis:   Encounter Diagnosis   Name Primary?    Chronic pain of right knee Yes     Physician: Barrington Prieto DO    Visit Date: 4/21/2023    Physician Orders: PT Eval and Treat   Medical Diagnosis from Referral: Contusion of right knee, subsequent encounter   Evaluation Date: 3/30/2023  Authorization Period Expiration: 03/27/2023 03/20/2024   Plan of Care Expiration: 6/30/23  Visit # / Visits authorized: 6/12  PTA Visit #: 0/5   FOTO: 2/5    Time In: 11:05am  Time Out: 12:00am  Total Billable Time: 55 minutes (3TE, 1MT)    SUBJECTIVE     Pt reports: Patient reports less pain today as compared to last visit. Still with chief c/o anterior knee pain and tightness posteriorly. Reports most difficulties with standing for a long period of time or completing stairs (up and down).    She was compliant with home exercise program.  Response to previous treatment: legs felt sore for approx 12 hours  Functional change: none significant at this time    Pain: 6/10  Location: right knee anteriorly     OBJECTIVE     Objective Measures updated at progress report unless specified.     Treatment     Talya received the treatments listed below:      manual therapy techniques: Joint mobilizations, Manual traction, Myofacial release, Soft tissue Mobilization, and Friction Massage were applied to the: right knee for 10 minutes, including:    STM and Rolling pin to R quad (emphasis distomedially) - not today  Supine R knee flexion JM Gr IV 3x40  Supine R patellar glides in all directions Gr IV 3x25  Seated distraction with PROM 3x30"     therapeutic exercises to develop strength, endurance, ROM, flexibility, posture, and core stabilization for 45 minutes including:    Straight leg raise: 2x10  Sidelying clamshells 3x10 no TB, emphasis on form  Side lying hip ABD 2x10   Bridging 2x10    Prone Quad " "stretch 3x 30"  Prone HS curl x15 3# (cramping in HS)     Long arc quad: 2x20 right; 2#  Seated heel slides: next  Seated HS stretch 30" x3    Standing R G/S stretch 3x30"    Cybex HC curl 4 plates 3x10   Cybex knee extension 15# 2x10  Leg press: 30x double leg, 3 plates +6 pounds, sled=4    Patient Education and Home Exercises     Home Exercises Provided and Patient Education Provided   Education provided:   - PT POC   - PT goals   - exercises/HEP    Written Home Exercises Provided: yes. Exercises were reviewed and Talya was able to demonstrate them prior to the end of the session.  Talya demonstrated good  understanding of the education provided. See EMR under Patient Instructions for exercises provided during therapy sessions    ASSESSMENT     Talya is a 58 y.o. female referred to outpatient Physical Therapy with a medical diagnosis of Contusion of right knee, subsequent encounter. Pt with slowly improving R knee AROM flexion/extension as compared to initial evaluation and with (+) response to manual therapy interventions today with improved pre to post knee flexion AROM. Pt with increase in pain with primarily with closed chain/functional activities such as prolonged standing/walking. Focusing on ROM, open > closed chain strengthening at this time. Progress as tolerated.     Talya Is progressing well towards her goals.   Pt prognosis is Good.     Pt will continue to benefit from skilled outpatient physical therapy to address the deficits listed in the problem list box on initial evaluation, provide pt/family education and to maximize pt's level of independence in the home and community environment.     Pt's spiritual, cultural and educational needs considered and pt agreeable to plan of care and goals.     Anticipated barriers to physical therapy: work conditions    Goals:   Short Term Goals:  4 weeks  1.Report decreased R knee pain < / =  8/10  to increase tolerance for walking  2. Increase PROM R knee   3. " Increased strength by 1/3 MMT grade in R knee to increase tolerance for ADL and work activities.  4. Pt to tolerate HEP to improve ROM and independence with ADL's     Long Term Goals: 8 weeks  1.Report decreased R knee pain  < / =  2 /10  to increase tolerance for WALKING  2.Increase AROM to WNL R KNEE  3.Increase strength to >/= 4/5 in R LE  to increase tolerance for ADL and work activities.  4. Pt goal: pain-free walking/stairs  5. Pt will have improved gcode of CJ (20-40% limited) on FOTO shoulder in order to demonstrate true functional improvement.     PLAN     Con per POC.    Bimal Cristina, PT

## 2023-04-24 ENCOUNTER — CLINICAL SUPPORT (OUTPATIENT)
Dept: REHABILITATION | Facility: HOSPITAL | Age: 58
End: 2023-04-24
Payer: COMMERCIAL

## 2023-04-24 DIAGNOSIS — M25.561 CHRONIC PAIN OF RIGHT KNEE: Primary | ICD-10-CM

## 2023-04-24 DIAGNOSIS — G89.29 CHRONIC PAIN OF RIGHT KNEE: Primary | ICD-10-CM

## 2023-04-24 PROCEDURE — 97110 THERAPEUTIC EXERCISES: CPT | Mod: PN,CQ

## 2023-04-24 PROCEDURE — 97112 NEUROMUSCULAR REEDUCATION: CPT | Mod: PN,CQ

## 2023-04-24 NOTE — PROGRESS NOTES
"[facility]   Physical Therapy Treatment Note     Name: Talya Bates  Clinic Number: [id number]    Therapy Diagnosis:   Encounter Diagnosis   Name Primary?    Chronic pain of right knee Yes       Physician: Barrington Prieto DO    Visit Date: 4/24/2023    Physician Orders: PT Eval and Treat   Medical Diagnosis from Referral: Contusion of right knee, subsequent encounter   Evaluation Date: 3/30/2023  Authorization Period Expiration: 03/27/2023 03/20/2024   Plan of Care Expiration: 6/30/23  Visit # / Visits authorized: 7/12  PTA Visit #: 1/5   FOTO: 3/5    Time In: 11:05am  Time Out: 12:00pm  Total Billable Time: 55 minutes (3TE, 1MT)    SUBJECTIVE     Pt reports: Patient reports medial knee pain, states she felt she got a good work out previous visit.   She was compliant with home exercise program.  Response to previous treatment: legs felt sore for approx 12 hours  Functional change: none significant at this time    Pain: 6/10  Location: right knee anteriorly     OBJECTIVE     Objective Measures updated at progress report unless specified.     Treatment     Talya received the treatments listed below:      manual therapy techniques: Joint mobilizations, Manual traction, Myofacial release, Soft tissue Mobilization, and Friction Massage were applied to the: right knee for 00 minutes, including:    STM and Rolling pin to R quad (emphasis distomedially) - not today  Supine R knee flexion JM Gr IV 3x40  Supine R patellar glides in all directions Gr IV 3x25  Seated distraction with PROM 3x30"     therapeutic exercises to develop strength, endurance, ROM, flexibility, posture, and core stabilization for 15 minutes including:    Prone Quad stretch 3x 30"  Prone HS curl x20 (no weight, no cramping.     Leg press: 30x double leg, 3 plates +6 pounds, sled=4    Previous:   Straight leg raise: 2x10  Sidelying clamshells 3x10 no TB, emphasis on form  Side lying hip ABD 2x10   Bridging 2x10      Long " "arc quad: 2x20 right; 2#  Seated heel slides: next  Seated HS stretch 30" x3    Standing R G/S stretch 3x30"    Cybex HC curl 4 plates 3x10   Cybex knee extension 15# 2x10    Talya participated in neuromuscular re-education activities to improve: Coordination, Kinesthetic, and Proprioception for 40  minutes. The following activities were included:    R Side Lying R hip internal Rotation Stretch 10x10"  Seated R hip Pull Back with R ADD squeeze (hand out issued)   Supine Hook lying Bridges with PPT emphasis  2x10"    L Side Lying R hip Pull back with hip ADD + IR 5x5 breaths      Patient Education and Home Exercises     Home Exercises Provided and Patient Education Provided   Education provided:   - PT POC   - PT goals   - exercises/HEP    Written Home Exercises Provided: yes. Exercises were reviewed and Talya was able to demonstrate them prior to the end of the session.  Talya demonstrated good  understanding of the education provided. See EMR under Patient Instructions for exercises provided during therapy sessions    ASSESSMENT     Talya is a 58 y.o. female referred to outpatient Physical Therapy with a medical diagnosis of Contusion of right knee, subsequent encounter. Continued cramping with bridges, PTA noted pelvis orientation to L in supine and in sitting. Initiated seated R hip pull back with ADD squeeze to orient patient to R and promote posterior rotation of R hemipelvis. Patient very challenged with with neuromuscularly. deferred to  R side Lying R hip internal rotation stretch to promote R hip IR and pelvis posterior rotation. Patient with improved sitting R hip pull back performance post R side lying activity. After performing R hip pull back and IR activities, patient able to perform supine hook lying bridges with out episode of cramping but continued to be challenged with PPT. Development of coordinated posterior rotation of R pelvis is paramount to normal  R stance and L swing mechanics for " proper force attenuation through RLE in stance and reduce knee pain related to required work activities, ADL, IADL. Hand out issued with seated R hip pull back with R add squeeze.     Talya Is progressing well towards her goals.   Pt prognosis is Good.     Pt will continue to benefit from skilled outpatient physical therapy to address the deficits listed in the problem list box on initial evaluation, provide pt/family education and to maximize pt's level of independence in the home and community environment.     Pt's spiritual, cultural and educational needs considered and pt agreeable to plan of care and goals.     Anticipated barriers to physical therapy: work conditions    Goals:   Short Term Goals:  4 weeks  1.Report decreased R knee pain < / =  8/10  to increase tolerance for walking  2. Increase PROM R knee   3. Increased strength by 1/3 MMT grade in R knee to increase tolerance for ADL and work activities.  4. Pt to tolerate HEP to improve ROM and independence with ADL's     Long Term Goals: 8 weeks  1.Report decreased R knee pain  < / =  2 /10  to increase tolerance for WALKING  2.Increase AROM to WNL R KNEE  3.Increase strength to >/= 4/5 in R LE  to increase tolerance for ADL and work activities.  4. Pt goal: pain-free walking/stairs  5. Pt will have improved gcode of CJ (20-40% limited) on FOTO shoulder in order to demonstrate true functional improvement.     PLAN     Con per POC.    Too Hendricks, PTA

## 2023-04-27 ENCOUNTER — OFFICE VISIT (OUTPATIENT)
Dept: URGENT CARE | Facility: CLINIC | Age: 58
End: 2023-04-27
Payer: COMMERCIAL

## 2023-04-27 VITALS
HEIGHT: 67 IN | WEIGHT: 209 LBS | SYSTOLIC BLOOD PRESSURE: 139 MMHG | BODY MASS INDEX: 32.8 KG/M2 | HEART RATE: 85 BPM | DIASTOLIC BLOOD PRESSURE: 84 MMHG | TEMPERATURE: 98 F

## 2023-04-27 DIAGNOSIS — S86.911A MUSCLE STRAIN OF RIGHT LOWER LEG, INITIAL ENCOUNTER: ICD-10-CM
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
## 2023-04-27 DIAGNOSIS — S80.01XD CONTUSION OF RIGHT KNEE, SUBSEQUENT ENCOUNTER: Primary | ICD-10-CM

## 2023-04-27 PROCEDURE — 99213 PR OFFICE/OUTPT VISIT, EST, LEVL III, 20-29 MIN: ICD-10-PCS | Mod: S$GLB,,, | Performed by: PHYSICIAN ASSISTANT

## 2023-04-27 PROCEDURE — 99213 OFFICE O/P EST LOW 20 MIN: CPT | Mod: S$GLB,,, | Performed by: PHYSICIAN ASSISTANT

## 2023-04-27 RX ORDER — LIDOCAINE 50 MG/G
1 PATCH TOPICAL DAILY
Qty: 15 PATCH | Refills: 0 | Status: SHIPPED | OUTPATIENT
Start: 2023-04-27 | End: 2023-05-12

## 2023-04-27 RX ORDER — ACETAMINOPHEN 500 MG
1000 TABLET ORAL EVERY 6 HOURS PRN
Qty: 60 TABLET | Refills: 0 | Status: SHIPPED | OUTPATIENT
Start: 2023-04-27 | End: 2023-10-23

## 2023-04-27 NOTE — LETTER
[facility]  [address] [address]  Phone: [phone]  Fax: [phone]  [facility] [employer] [facility]: [email or web address]    Pt Name: Talya Bates  Injury Date: 03/10/2023   Employee ID: [id number] Date of Treatment: 04/27/2023   Company: [employer]      Appointment Time: 9:00am Arrived: 8:50am   Provider: Buddy Evans PA-C Time Out: 10:00am     Office Treatment:   1. Contusion of right knee, subsequent encounter    2. Muscle strain of right lower leg, initial encounter    3. Work related injury      Medications Ordered This Encounter   Medications    acetaminophen (TYLENOL EXTRA STRENGTH) 500 MG tablet    LIDOcaine (LIDODERM) 5 %      Patient Instructions: Daily home exercises/warm soaks, Continue Physical Therapy        Restrictions: No Prolonged standing/walking, Sit or stand as needed     Return Appointment: 5/11/2023 at 9:30am.  EC

## 2023-04-27 NOTE — PROGRESS NOTES
Subjective:      Patient ID: Talya Bates is a 58 y.o. female.    Chief Complaint: Knee Pain (rt)    Patient's place of employment - [employer]  Patient's job title - [employer]  Date of Injury - 03-10-23  Body part injured - rt knee  Current work status per last visit - regular duty  Improved, same, or worse - slight improvement until a muscle was torn in therapy  Pain Scale right now (1-10) -  8    Patient presents for follow-up right knee contusion.  She reports the knee is improving however strained her calf muscle while conducting physical therapy last week.  Says pain is 8/10, worse with ambulation.  She denies any ankle pain or numbness or tingling of the foot.  She is been using diclofenac gel with some relief. MEB    Constitution: Positive for activity change.   Musculoskeletal:  Positive for pain and joint pain.   Skin:  Negative for bruising.   Neurological:  Negative for numbness and tingling.   Objective:     Physical Exam  Vitals and nursing note reviewed.   Constitutional:       General: She is not in acute distress.     Appearance: She is well-developed. She is not diaphoretic.   HENT:      Head: Normocephalic and atraumatic.      Right Ear: Hearing and external ear normal.      Left Ear: Hearing and external ear normal.      Nose: Nose normal. No nasal deformity.   Eyes:      General: Lids are normal. No scleral icterus.     Conjunctiva/sclera: Conjunctivae normal.   Neck:      Trachea: Trachea normal.   Cardiovascular:      Pulses: Normal pulses.   Pulmonary:      Effort: Pulmonary effort is normal. No respiratory distress.      Breath sounds: No stridor.   Musculoskeletal:      Cervical back: Normal range of motion.      Right knee: No swelling. Decreased range of motion. Tenderness present.      Right lower leg: Tenderness present.        Legs:       Comments: Exquisite focal tenderness at the proximal medial aspect of the right leg.  No E/E/D present.  Limited range of motion of knee.   Ligaments appear intact.   Skin:     General: Skin is warm and dry.      Capillary Refill: Capillary refill takes less than 2 seconds.   Neurological:      Mental Status: She is alert. She is not disoriented.      GCS: GCS eye subscore is 4. GCS verbal subscore is 5. GCS motor subscore is 6.      Sensory: No sensory deficit.   Psychiatric:         Attention and Perception: She is attentive.         Speech: Speech normal.         Behavior: Behavior normal.      Assessment:      1. Contusion of right knee, subsequent encounter    2. Muscle strain of right lower leg, initial encounter    3. Work related injury      Plan:     Patient with improving contusion of the right knee however appears to have sustained a new injury to the calf muscle while conducting physical therapy.  Instructed to do light stretches and warm soaks.  She may use diclofenac gel and lidocaine patches.  I will prescribe Tylenol as needed for pain.  Recommended PT however patient seems averse to returning due to her recent injury.      Medications Ordered This Encounter   Medications    acetaminophen (TYLENOL EXTRA STRENGTH) 500 MG tablet     Sig: Take 2 tablets (1,000 mg total) by mouth every 6 (six) hours as needed for Pain.     Dispense:  60 tablet     Refill:  0    LIDOcaine (LIDODERM) 5 %     Sig: Place 1 patch onto the skin once daily. Remove & Discard patch within 12 hours or as directed by MD for 15 days     Dispense:  15 patch     Refill:  0     Patient Instructions: Daily home exercises/warm soaks, Continue Physical Therapy   Restrictions: No Prolonged standing/walking, Sit or stand as needed  Follow up in about 2 weeks (around 5/11/2023).

## 2023-04-28 ENCOUNTER — CLINICAL SUPPORT (OUTPATIENT)
Dept: REHABILITATION | Facility: HOSPITAL | Age: 58
End: 2023-04-28
Payer: COMMERCIAL

## 2023-04-28 DIAGNOSIS — G89.29 CHRONIC PAIN OF RIGHT KNEE: Primary | ICD-10-CM

## 2023-04-28 DIAGNOSIS — M25.561 CHRONIC PAIN OF RIGHT KNEE: Primary | ICD-10-CM

## 2023-04-28 PROCEDURE — 97140 MANUAL THERAPY 1/> REGIONS: CPT | Mod: PN

## 2023-04-28 PROCEDURE — 97110 THERAPEUTIC EXERCISES: CPT | Mod: PN

## 2023-04-28 NOTE — PROGRESS NOTES
"[facility]   Physical Therapy Treatment Note     Name: Talya Bates  Clinic Number: [id number]    Therapy Diagnosis:   Encounter Diagnosis   Name Primary?    Chronic pain of right knee Yes     Physician: Barrington Prieto DO    Visit Date: 4/28/2023    Physician Orders: PT Eval and Treat   Medical Diagnosis from Referral: Contusion of right knee, subsequent encounter   Evaluation Date: 3/30/2023  Authorization Period Expiration: 03/27/2023 03/20/2024   Plan of Care Expiration: 6/30/23  Visit # / Visits authorized: 8/12  PTA Visit #: 0/5   FOTO: 4/5    Time In: 11:00am  Time Out: 12:00pm  Total Billable Time: 60 minutes (3TE, 1MT)    SUBJECTIVE     Pt reports: Patient reports medial knee pain and proximal calf discomfort. Reports she has been in a little pain since last visit and reports mild increase in swelling as well. No specific exercise or activity.    She was compliant with home exercise program.  Response to previous treatment: legs felt sore for approx 12 hours  Functional change: none significant at this time    Pain: 7/10  Location: right knee anteriorly     OBJECTIVE     Objective Measures updated at progress report unless specified.     Treatment     Talya received the treatments listed below:      manual therapy techniques: Joint mobilizations, Manual traction, Myofacial release, Soft tissue Mobilization, and Friction Massage were applied to the: right knee for 20 minutes, including:    Prone Astym to the posterior chain   Prone STM to the G/S complex post Astym completion    Not today:   STM and Rolling pin to R quad (emphasis distomedially) - not today  Supine R knee flexion JM Gr IV 3x40  Supine R patellar glides in all directions Gr IV 3x25  Seated distraction with PROM 3x30"     therapeutic exercises to develop strength, endurance, ROM, flexibility, posture, and core stabilization for 40 minutes including:  LAQ 3# wt 3x10   Supine bridging 3x10   SLR L 3x10   SL " "clam vs GTB 3x10   Prone Quad stretch 3x 30"  Prone HS curl x20 (no weight, no cramping)     Leg press: 30x double leg, 3 plates +6 pounds, sled=4    Fitter G/S stretch 3x30" each     Standing B heel raises x30  Standing B hip abduction 2x15    Nustep x5 mins    Not today:   Cybex HC curl 4 plates 3x10   Cybex knee extension 15# 2x10    Talya participated in neuromuscular re-education activities to improve: Coordination, Kinesthetic, and Proprioception for 0 minutes. The following activities were included:    R Side Lying R hip internal Rotation Stretch 10x10"  Seated R hip Pull Back with R ADD squeeze (hand out issued)   Supine Hook lying Bridges with PPT emphasis  2x10"    L Side Lying R hip Pull back with hip ADD + IR 5x5 breaths    Patient Education and Home Exercises     Home Exercises Provided and Patient Education Provided   Education provided:   - PT POC   - PT goals   - exercises/HEP    Written Home Exercises Provided: yes. Exercises were reviewed and Talya was able to demonstrate them prior to the end of the session.  Talya demonstrated good  understanding of the education provided. See EMR under Patient Instructions for exercises provided during therapy sessions    ASSESSMENT     Talya is a 58 y.o. female referred to outpatient Physical Therapy with a medical diagnosis of Contusion of right knee, subsequent encounter. Activities modified today as pt to session with reports of increased swelling and pain post last visit. Pt with ttp to proximal medial gastrocnemius head. Pt rated pain 7/10 upon entry and dropped 2 NPRS post Astym and STM applied. Plan to progress next visit back to prior load with emphasis on closed chain, functional strengthening with goal of progressing pt for readiness to RTW.     Talya Is progressing well towards her goals.   Pt prognosis is Good.     Pt will continue to benefit from skilled outpatient physical therapy to address the deficits listed in the problem list box on " initial evaluation, provide pt/family education and to maximize pt's level of independence in the home and community environment.     Pt's spiritual, cultural and educational needs considered and pt agreeable to plan of care and goals.     Anticipated barriers to physical therapy: work conditions    Goals:   Short Term Goals:  4 weeks  1.Report decreased R knee pain < / =  8/10  to increase tolerance for walking  2. Increase PROM R knee   3. Increased strength by 1/3 MMT grade in R knee to increase tolerance for ADL and work activities.  4. Pt to tolerate HEP to improve ROM and independence with ADL's     Long Term Goals: 8 weeks  1.Report decreased R knee pain  < / =  2 /10  to increase tolerance for WALKING  2.Increase AROM to WNL R KNEE  3.Increase strength to >/= 4/5 in R LE  to increase tolerance for ADL and work activities.  4. Pt goal: pain-free walking/stairs  5. Pt will have improved gcode of CJ (20-40% limited) on FOTO shoulder in order to demonstrate true functional improvement.     PLAN     Con per POC.    Bimal Cristina, PT

## 2023-05-01 ENCOUNTER — CLINICAL SUPPORT (OUTPATIENT)
Dept: REHABILITATION | Facility: HOSPITAL | Age: 58
End: 2023-05-01
Payer: COMMERCIAL

## 2023-05-01 DIAGNOSIS — M25.561 CHRONIC PAIN OF RIGHT KNEE: Primary | ICD-10-CM

## 2023-05-01 DIAGNOSIS — G89.29 CHRONIC PAIN OF RIGHT KNEE: Primary | ICD-10-CM

## 2023-05-01 PROCEDURE — 97110 THERAPEUTIC EXERCISES: CPT | Mod: PN

## 2023-05-01 PROCEDURE — 97140 MANUAL THERAPY 1/> REGIONS: CPT | Mod: PN

## 2023-05-01 NOTE — PROGRESS NOTES
[facility]   Physical Therapy Treatment Note     Name: Talya Bates  Clinic Number: [id number]    Therapy Diagnosis:   Encounter Diagnosis   Name Primary?    Chronic pain of right knee Yes     Physician: Barrington Prieto DO    Visit Date: 5/1/2023    Physician Orders: PT Eval and Treat   Medical Diagnosis from Referral: Contusion of right knee, subsequent encounter   Evaluation Date: 3/30/2023  Authorization Period Expiration: 03/27/2023 03/20/2024   Plan of Care Expiration: 6/30/23  Visit # / Visits authorized: 10/12  PTA Visit #: 0/5   FOTO: 5/5    Time In: 11:00am  Time Out: 12:00pm  Total Billable Time: 60 minutes (3TE, 1MT)    SUBJECTIVE     Pt reports: Patient reports ongoing medial knee pain and proximal calf discomfort, though better than last visit. Reports chief c/o knee pain with secondary c/o mild calf discomfort.    She was compliant with home exercise program.  Response to previous treatment: legs felt sore for approx 12 hours  Functional change: none significant at this time    Pain: 6/10  Location: right knee anteriorly     OBJECTIVE     Objective Measures updated at progress report unless specified.     Observation: limping, mid foot contact decreased knee flexion and extension     Range of Motion (Active):   Knee Right  Left    Flexion 123 AROM  128 PROM 125   Extension 0 AROM  5 PROM - Hyper 5 hyper       Lower Extremity Strength    Right LE Left LE   Quadriceps: 4/5 3/5   Hamstrings: 4-/5 3/5   Hip flexion (supine): 3+/5 3/5   Hip extension:  3/5 3-/5   Hip ER:  3/5 3/5   Hip IR: 3+/5 3/5      Special Tests:    Right   Valgus Stress Test  -   Varus Stress test -   Lachman's test -   Posterior Lachman -   Klaudia's Test -   Thessaly's Test Discomfort and difficulty noted   Patellar Grind Test -   No pain with overpressure extension, pain with overpressure flexion about end ROM     Joint Mobility: patellar mobility normal, fat pad normal     Palpation: med>lat joint  "line; medial gastroc head proximally      *Cluster exam for suspected meniscal involvement: Hx joint locking, JLT, Raaenn, P w/ OP flexion, P w/ OP extension: Met 2/5    Treatment     Talya received the treatments listed below:      manual therapy techniques: Joint mobilizations, Manual traction, Myofacial release, Soft tissue Mobilization, and Friction Massage were applied to the: right knee for 15 minutes, including:    Supine R knee flexion JM Gr IV 3x40  Supine R patellar glides in all directions Gr IV 3x25  Seated distraction with PROM 3x30"     therapeutic exercises to develop strength, endurance, ROM, flexibility, posture, and core stabilization for 45 minutes including:    LAQ 3# wt 3x10   Supine bridging 3x10   SLR L 3x10   SL clam vs GTB 3x10   Prone Quad stretch 3x 30"  Prone HS curl x20 (no weight, no cramping)     Leg press: 30x double leg, 3 plates +6 pounds, sled=4  Fitter G/S stretch 3x30" each   Standing B heel raises x30  Standing B hip abduction 2x15    Nustep x5 mins    Not today:   Cybex HC curl 4 plates 3x10   Cybex knee extension 15# 2x10    Talya participated in neuromuscular re-education activities to improve: Coordination, Kinesthetic, and Proprioception for 0 minutes. The following activities were included:    R Side Lying R hip internal Rotation Stretch 10x10"  Seated R hip Pull Back with R ADD squeeze (hand out issued)   Supine Hook lying Bridges with PPT emphasis  2x10"    L Side Lying R hip Pull back with hip ADD + IR 5x5 breaths    Patient Education and Home Exercises     Home Exercises Provided and Patient Education Provided   Education provided:   - PT POC   - PT goals   - exercises/HEP    Written Home Exercises Provided: yes. Exercises were reviewed and Talya was able to demonstrate them prior to the end of the session.  Talya demonstrated good  understanding of the education provided. See EMR under Patient Instructions for exercises provided during therapy " sessions    ASSESSMENT     Talya is a 58 y.o. female referred to outpatient Physical Therapy with a medical diagnosis of Contusion of right knee, subsequent encounter.     Overall, the pt is progressing fairly with PT intervention at this time. She is continuing to work full time, full duty per her reports with the following restrictions in place: No Prolonged standing/walking, Sit or stand as needed. Activities have been modified over the past 2 visits due to pt reports of medial proximal gastrocnemius discomfort and pt reports these s/s are progressing well in a positive direction. Overall, she has progressed well in terms of ROM and strength per objective examination above. Pt functionally with difficulty with squatting and lifting; otherwise, denies any significant issues with prolonged walking, stair negotiation, standing. Ultimately, feel as though the pt may benefit from 1 additional bout of PT intervention with goal of DC at that time. Plan to progress load with emphasis on closed chain, functional strengthening with goal of progressing pt for readiness to RTW.     Talya Is progressing well towards her goals.   Pt prognosis is Good.     Pt will continue to benefit from skilled outpatient physical therapy to address the deficits listed in the problem list box on initial evaluation, provide pt/family education and to maximize pt's level of independence in the home and community environment.     Pt's spiritual, cultural and educational needs considered and pt agreeable to plan of care and goals.     Anticipated barriers to physical therapy: work conditions    Goals:   Short Term Goals:  4 weeks  1.Report decreased R knee pain < / =  8/10  to increase tolerance for walking MET  2. Increase PROM R knee MET  3. Increased strength by 1/3 MMT grade in R knee to increase tolerance for ADL and work activities. MET  4. Pt to tolerate HEP to improve ROM and independence with ADL's MET      Long Term Goals: 8  weeks  1.Report decreased R knee pain  < / =  2 /10  to increase tolerance for WALKING IMPROVING  2.Increase AROM to WNL R KNEE IMPROVING   3.Increase strength to >/= 4/5 in R LE  to increase tolerance for ADL and work activities. IMPROVING  4. Pt goal: pain-free walking/stairs MET  5. Pt will have improved gcode of CJ (20-40% limited) on FOTO shoulder in order to demonstrate true functional improvement. PROGRESSING    PLAN     See Updated POC    Bimal Cristina, PT

## 2023-05-04 ENCOUNTER — CLINICAL SUPPORT (OUTPATIENT)
Dept: REHABILITATION | Facility: HOSPITAL | Age: 58
End: 2023-05-04
Payer: COMMERCIAL

## 2023-05-04 DIAGNOSIS — G89.29 CHRONIC PAIN OF RIGHT KNEE: Primary | ICD-10-CM

## 2023-05-04 DIAGNOSIS — M25.561 CHRONIC PAIN OF RIGHT KNEE: Primary | ICD-10-CM

## 2023-05-04 PROCEDURE — 97110 THERAPEUTIC EXERCISES: CPT | Mod: PN

## 2023-05-04 NOTE — PROGRESS NOTES
"[facility]   Physical Therapy Treatment Note     Name: Talya Bates  Clinic Number: [id number]    Therapy Diagnosis:   Encounter Diagnosis   Name Primary?    Chronic pain of right knee Yes     Physician: Barrington Prieto DO    Visit Date: 5/4/2023    Physician Orders: PT Eval and Treat   Medical Diagnosis from Referral: Contusion of right knee, subsequent encounter   Evaluation Date: 3/30/2023  Authorization Period Expiration: 03/27/2023 03/20/2024   Plan of Care Expiration: 6/30/23  Visit # / Visits authorized: 10/12  PTA Visit #: 0/5   FOTO: 11/10 - Done today    Time In: 11:05 am  Time Out: 12:00 pm  Total Billable Time: 55 minutes (4 TE)  SUBJECTIVE     Pt reports: Patient reports medial knee pain and proximal calf discomfort. Reports she has been in a little pain since last visit and reports mild increase in swelling as well. No specific exercise or activity.    She was compliant with home exercise program.  Response to previous treatment: legs felt sore for approx 12 hours  Functional change: none significant at this time    Pain: 7/10  Location: right knee anteriorly     OBJECTIVE     Objective Measures updated at progress report unless specified.     Treatment     Talya received the treatments listed below:      manual therapy techniques: Joint mobilizations, Manual traction, Myofacial release, Soft tissue Mobilization, and Friction Massage were applied to the: right knee for 00 minutes, including:    Prone Astym to the posterior chain   Prone STM to the G/S complex post Astym completion    Not today:   STM and Rolling pin to R quad (emphasis distomedially) - not today  Supine R knee flexion JM Gr IV 3x40  Supine R patellar glides in all directions Gr IV 3x25  Seated distraction with PROM 3x30"     therapeutic exercises to develop strength, endurance, ROM, flexibility, posture, and core stabilization for 55 minutes including:  LAQ 3# wt 3x10   Supine bridging 3x10   SLR R " "3x8, 1.5#  SL clam vs GTB 3x10   Prone Quad stretch 15x10'' holds with strap  Prone HS curl 2x12 (no weight, no cramping)    Leg press: 2x20 double leg, 5 plates, sled=5    Fitter G/S stretch 3x30" each     Standing B heel raises x30 off ramp  Standing B hip abduction 2x10 on blue theraband pad    Nustep x5 mins - not today    Not today:   Cybex HC curl 4 plates 3x10   Cybex knee extension 15# 2x10    Talya participated in neuromuscular re-education activities to improve: Coordination, Kinesthetic, and Proprioception for 00 minutes. The following activities were included:    R Side Lying R hip internal Rotation Stretch 10x10"  Seated R hip Pull Back with R ADD squeeze (hand out issued)   Supine Hook lying Bridges with PPT emphasis  2x10"    L Side Lying R hip Pull back with hip ADD + IR 5x5 breaths    Patient Education and Home Exercises     Home Exercises Provided and Patient Education Provided   Education provided:   - PT POC   - PT goals   - exercises/HEP    Written Home Exercises Provided: yes. Exercises were reviewed and Talya was able to demonstrate them prior to the end of the session.  Talya demonstrated good  understanding of the education provided. See EMR under Patient Instructions for exercises provided during therapy sessions    ASSESSMENT     Talya is a 58 y.o. female referred to outpatient Physical Therapy with a medical diagnosis of Contusion of right knee, subsequent encounter. Minimal symptoms of knee pain and pt only spoke of posterior calf pain when she is standing/walking on her feet for too long. No acute calf tenderness today, and no complaints with heel raises either. Progress single leg activities next visit to pt tolerance.    Talya Is progressing well towards her goals.   Pt prognosis is Good.     Pt will continue to benefit from skilled outpatient physical therapy to address the deficits listed in the problem list box on initial evaluation, provide pt/family education and to maximize " pt's level of independence in the home and community environment.     Pt's spiritual, cultural and educational needs considered and pt agreeable to plan of care and goals.     Anticipated barriers to physical therapy: work conditions    Goals:   Short Term Goals:  4 weeks  1.Report decreased R knee pain < / =  8/10  to increase tolerance for walking  2. Increase PROM R knee   3. Increased strength by 1/3 MMT grade in R knee to increase tolerance for ADL and work activities.  4. Pt to tolerate HEP to improve ROM and independence with ADL's     Long Term Goals: 8 weeks  1.Report decreased R knee pain  < / =  2 /10  to increase tolerance for WALKING  2.Increase AROM to WNL R KNEE  3.Increase strength to >/= 4/5 in R LE  to increase tolerance for ADL and work activities.  4. Pt goal: pain-free walking/stairs  5. Pt will have improved gcode of CJ (20-40% limited) on FOTO shoulder in order to demonstrate true functional improvement.     PLAN     Con per POC.    Imer Stevens, PT

## 2023-05-05 NOTE — PLAN OF CARE
[facility]  Workers' Compensation Physical Therapy Plan of Care Note     Visit Date: 5/1/2023    Name: Talya Bates  Clinic Number: [id number]    Therapy Diagnosis:   Encounter Diagnosis   Name Primary?    Chronic pain of right knee Yes     Physician: Barrington Prieto DO    Visit Date: 5/1/2023    Physician Orders: PT Eval and Treat   Medical Diagnosis from Referral: Contusion of right knee, subsequent encounter   Evaluation Date: 3/30/2023  Authorization Period Expiration: 03/27/2023 03/20/2024   Plan of Care Expiration: 6/30/23  Visit # / Visits authorized: 10/12  PTA Visit #: 0/5   FOTO: 5/5    Time In: 11:00am  Time Out: 12:00pm  Total Billable Time: 60 minutes (3TE, 1MT)    Subjective     Update: Pt reports: Patient reports ongoing medial knee pain and proximal calf discomfort, though better than last visit. Reports chief c/o knee pain with secondary c/o mild calf discomfort.    She was compliant with home exercise program.  Response to previous treatment: legs felt sore for approx 12 hours  Functional change: none significant at this time    Pain: 6/10  Location: right knee anteriorly     Objective      Update: Observation: limping, mid foot contact decreased knee flexion and extension     Range of Motion (Active):   Knee Right  Left    Flexion 123 AROM  128 PROM 125   Extension 0 AROM  5 PROM - Hyper 5 hyper       Lower Extremity Strength    Right LE Left LE   Quadriceps: 4/5 3/5   Hamstrings: 4-/5 3/5   Hip flexion (supine): 3+/5 3/5   Hip extension:  3/5 3-/5   Hip ER:  3/5 3/5   Hip IR: 3+/5 3/5      Special Tests:    Right   Valgus Stress Test  -   Varus Stress test -   Lachman's test -   Posterior Lachman -   Klaudia's Test -   Thessaly's Test Discomfort and difficulty noted   Patellar Grind Test -   No pain with overpressure extension, pain with overpressure flexion about end ROM     Joint Mobility: patellar mobility normal, fat pad normal     Palpation: med>lat joint  line; medial gastroc head proximally      *Cluster exam for suspected meniscal involvement: Hx joint locking, JLT, Raeann, P w/ OP flexion, P w/ OP extension: Met 2/5    Assessment     Update: Talya is a 58 y.o. female referred to outpatient Physical Therapy with a medical diagnosis of Contusion of right knee, subsequent encounter.     Overall, the pt is progressing fairly with PT intervention at this time. She is continuing to work full time, full duty per her reports with the following restrictions in place: No Prolonged standing/walking, Sit or stand as needed. Activities have been modified over the past 2 visits due to pt reports of medial proximal gastrocnemius discomfort and pt reports these s/s are progressing well in a positive direction. Overall, she has progressed well in terms of ROM and strength per objective examination above. Pt functionally with difficulty with squatting and lifting; otherwise, denies any significant issues with prolonged walking, stair negotiation, standing. Ultimately, feel as though the pt may benefit from 1 additional bout of PT intervention with goal of DC at that time. Plan to progress load with emphasis on closed chain, functional strengthening with goal of progressing pt for readiness to RTW.     Talya Is progressing well towards her goals.   Pt prognosis is Good.     Pt will continue to benefit from skilled outpatient physical therapy to address the deficits listed in the problem list box on initial evaluation, provide pt/family education and to maximize pt's level of independence in the home and community environment.     Goals:   Short Term Goals:  4 weeks  1.Report decreased R knee pain < / =  8/10  to increase tolerance for walking MET  2. Increase PROM R knee MET  3. Increased strength by 1/3 MMT grade in R knee to increase tolerance for ADL and work activities. MET  4. Pt to tolerate HEP to improve ROM and independence with ADL's MET      Long Term Goals: 8  weeks  1.Report decreased R knee pain  < / =  2 /10  to increase tolerance for WALKING IMPROVING  2.Increase AROM to WNL R KNEE IMPROVING   3.Increase strength to >/= 4/5 in R LE  to increase tolerance for ADL and work activities. IMPROVING  4. Pt goal: pain-free walking/stairs MET  5. Pt will have improved gcode of CJ (20-40% limited) on FOTO shoulder in order to demonstrate true functional improvement. PROGRESSING    Long Term Goal Status:   continue per initial plan of care.  Reasons for Recertification of Therapy:   Ongoing impairments and functional deficits     Plan     Updated Certification Period: 5/1/2023 to 6/9/2023  Recommended Treatment Plan: 3 times per week for 2 weeks: Manual Therapy, Neuromuscular Re-ed, Patient Education, Self Care, Therapeutic Activities, and Therapeutic Exercise    Bimal Cristina, PT  5/1/2023

## 2023-05-08 ENCOUNTER — CLINICAL SUPPORT (OUTPATIENT)
Dept: REHABILITATION | Facility: HOSPITAL | Age: 58
End: 2023-05-08
Payer: COMMERCIAL

## 2023-05-08 DIAGNOSIS — M25.561 CHRONIC PAIN OF RIGHT KNEE: Primary | ICD-10-CM

## 2023-05-08 DIAGNOSIS — G89.29 CHRONIC PAIN OF RIGHT KNEE: Primary | ICD-10-CM

## 2023-05-08 PROCEDURE — 97140 MANUAL THERAPY 1/> REGIONS: CPT | Mod: PN

## 2023-05-08 PROCEDURE — 97110 THERAPEUTIC EXERCISES: CPT | Mod: PN

## 2023-05-09 ENCOUNTER — OFFICE VISIT (OUTPATIENT)
Dept: OBSTETRICS AND GYNECOLOGY | Facility: CLINIC | Age: 58
End: 2023-05-09
Payer: COMMERCIAL

## 2023-05-09 VITALS
DIASTOLIC BLOOD PRESSURE: 71 MMHG | WEIGHT: 209 LBS | HEIGHT: 67 IN | SYSTOLIC BLOOD PRESSURE: 129 MMHG | BODY MASS INDEX: 32.8 KG/M2

## 2023-05-09 DIAGNOSIS — Z01.419 ROUTINE GYNECOLOGICAL EXAMINATION: Primary | ICD-10-CM

## 2023-05-09 PROCEDURE — 99499 NO LOS: ICD-10-PCS | Mod: S$GLB,,, | Performed by: OBSTETRICS & GYNECOLOGY

## 2023-05-09 PROCEDURE — 99499 UNLISTED E&M SERVICE: CPT | Mod: S$GLB,,, | Performed by: OBSTETRICS & GYNECOLOGY

## 2023-05-11 ENCOUNTER — OFFICE VISIT (OUTPATIENT)
Dept: URGENT CARE | Facility: CLINIC | Age: 58
End: 2023-05-11
Payer: COMMERCIAL

## 2023-05-11 VITALS
HEIGHT: 67 IN | RESPIRATION RATE: 18 BRPM | OXYGEN SATURATION: 98 % | BODY MASS INDEX: 32.65 KG/M2 | HEART RATE: 79 BPM | DIASTOLIC BLOOD PRESSURE: 82 MMHG | SYSTOLIC BLOOD PRESSURE: 126 MMHG | WEIGHT: 208 LBS

## 2023-05-11 DIAGNOSIS — Z02.6 ENCOUNTER RELATED TO WORKER'S COMPENSATION CLAIM: ICD-10-CM

## 2023-05-11 DIAGNOSIS — S80.01XD CONTUSION OF RIGHT KNEE, SUBSEQUENT ENCOUNTER: Primary | ICD-10-CM

## 2023-05-11 DIAGNOSIS — S86.911A MUSCLE STRAIN OF RIGHT LOWER LEG, INITIAL ENCOUNTER: ICD-10-CM

## 2023-05-11 PROCEDURE — 99213 PR OFFICE/OUTPT VISIT, EST, LEVL III, 20-29 MIN: ICD-10-PCS | Mod: S$GLB,,,

## 2023-05-11 PROCEDURE — 99213 OFFICE O/P EST LOW 20 MIN: CPT | Mod: S$GLB,,,

## 2023-05-11 NOTE — PROGRESS NOTES
Subjective:      Patient ID: Talya Bates is a 58 y.o. female.    Chief Complaint: Knee Injury (RT)    Talya feels the physical therapy has been beneficial in reducing her right knee symptoms.  Intensity of the pain has decreased but she does note increased pain and difficulty climbing and descending stairs.  More recently the elevator was out of service and so she had to climb stairs frequently which resulted in increased pain.  However, Physical therapist was able to help reduce the pain.    Patient's place of employment - [employer] ([place]  Patient's job title - [employer]  Date of Injury - 03-10-23  Body part injured - RT Knee - when getting a bucket out of the pantry, it ran into her RT Knee  Current work status per last visit - No prolonged standing/walking, Sit/stand as needed  Improved, same, or worse - Improved  Pain Scale right now (1-10) -  5/10    Knee Injury  Associated symptoms include arthralgias, joint swelling and myalgias. Pertinent negatives include no numbness.   Constitution: Negative.   HENT: Negative.     Neck: neck negative.   Cardiovascular: Negative.    Eyes: Negative.    Respiratory: Negative.     Gastrointestinal: Negative.    Genitourinary: Negative.    Musculoskeletal:  Positive for joint pain, joint swelling and muscle ache. Negative for pain, trauma, abnormal ROM of joint and muscle cramps.   Skin: Negative.    Neurological:  Negative for numbness and tingling.   Objective:     Physical Exam  Vitals and nursing note reviewed.   Constitutional:       General: She is not in acute distress.  HENT:      Head: Normocephalic.   Eyes:      General: Lids are normal.      Conjunctiva/sclera: Conjunctivae normal.   Musculoskeletal:      Cervical back: No pain with movement.      Right knee: Crepitus present. No swelling, deformity, erythema, ecchymosis or bony tenderness. Normal range of motion. Tenderness present. No LCL laxity, MCL laxity or ACL laxity. Normal alignment and normal  meniscus. Normal pulse.      Instability Tests: Anterior drawer test negative. Medial Raeann test negative and lateral Raeann test negative.        Legs:       Comments: No gross deformity noted to the right knee.  No overlying skin changes such as swelling, erythema, or bruising.  Tenderness on palpation to lower medial knee and upper medial calf area.  Crepitus noted during passive movement of her right knee.  Otherwise full range of motion of her right knee.  No ligamentous instability noted.  Raeann evaluation negative.  She does have some mild difficulty standing up from seated position and climbing/descending stairs.   Skin:     General: Skin is warm.      Capillary Refill: Capillary refill takes less than 2 seconds.   Neurological:      General: No focal deficit present.      Mental Status: She is alert and oriented to person, place, and time.      Deep Tendon Reflexes: Reflexes are normal and symmetric.   Psychiatric:         Attention and Perception: Attention normal.         Mood and Affect: Mood and affect normal.         Speech: Speech normal.         Behavior: Behavior normal. Behavior is cooperative.      Assessment:      1. Contusion of right knee, subsequent encounter    2. Encounter related to worker's compensation claim    3. Muscle strain of right lower leg, initial encounter      Plan:     Physical therapy notes were reviewed today.  There has been some gradual improvement in range of motion and reduction in pain symptoms despite occasional setbacks from climbing stairs.  She is nearly complete this order physical therapy and continues to have some difficulty with stairs.  Thus, I will order additional sessions to help her gain the ability to traverse stairs without experiencing significant pain.  She is aware to continue to perform her home exercises as well.  Follow up in approximately 3 weeks    I spent a total of 20 minutes on the day of the visit.This includes face to face time and  non-face to face time preparing to see the patient (eg, review of tests), obtaining and/or reviewing separately obtained history, documenting clinical information in the electronic or other health record, independently interpreting results and communicating results to the patient/family/caregiver, or care coordinator.          Restrictions: No Prolonged standing/walking, Sit or stand as needed  No follow-ups on file.

## 2023-05-11 NOTE — PROGRESS NOTES
"[facility]   Physical Therapy Treatment Note     Name: Talya Bates  Clinic Number: [id number]    Therapy Diagnosis:   Encounter Diagnosis   Name Primary?    Chronic pain of right knee Yes     Physician: Barrington Prieto DO    Visit Date: 5/8/2023    Physician Orders: PT Eval and Treat   Medical Diagnosis from Referral: Contusion of right knee, subsequent encounter   Evaluation Date: 3/30/2023  Authorization Period Expiration: 03/27/2023 03/20/2024   Plan of Care Expiration: 6/30/23  Visit # / Visits authorized: 11/12  PTA Visit #: 0/5   FOTO: 1/5    Time In: 10:00am  Time Out: 11:00am   Total Billable Time: 40 minutes (2TE, 1MT), 10 mins ice pre and post session today due to subj c/o    SUBJECTIVE     Pt reports: Patient reports that the elevators went out at work this morning and she had to go up and down the stairs carrying her gear. Reports that she tried her best to push through it, but she is in a great deal of pain at this time. Reports mostly about the medial knee (points to joint line).   She was compliant with home exercise program.  Response to previous treatment: legs felt sore for approx 12 hours  Functional change: none significant at this time    Pain: 8-9/10  Location: right knee anteriorly     OBJECTIVE     Objective Measures updated at progress report unless specified.     Treatment     Talya received the treatments listed below:      manual therapy techniques: Joint mobilizations, Manual traction, Myofacial release, Soft tissue Mobilization, and Friction Massage were applied to the: right knee for 10 minutes, including:    Prone Astym to the posterior chain   Prone STM to the G/S complex post Astym completion    Supine R knee flexion JM Gr IV 3x40  Supine R patellar glides in all directions Gr IV 3x25  Seated distraction with PROM 3x30"     therapeutic exercises to develop strength, endurance, ROM, flexibility, posture, and core stabilization for 30 minutes " "including:  LAQ 3# wt 3x10   Supine bridging 3x10   SLR R 3x8, 1.5#  SL clam vs GTB 3x10   Prone Quad stretch 15x10'' holds with strap  Prone HS curl 2x12     Leg press: 2x20 double leg, 5 plates, sled=5    Fitter G/S stretch 3x30" each     Standing B heel raises x30 off ramp  Standing B hip abduction 2x10 on blue theraband pad    Nustep x5 mins - not today    Not today:   Cybex HC curl 4 plates 3x10   Cybex knee extension 15# 2x10    Talya participated in neuromuscular re-education activities to improve: Coordination, Kinesthetic, and Proprioception for 00 minutes. The following activities were included:    R Side Lying R hip internal Rotation Stretch 10x10"  Seated R hip Pull Back with R ADD squeeze (hand out issued)   Supine Hook lying Bridges with PPT emphasis  2x10"    L Side Lying R hip Pull back with hip ADD + IR 5x5 breaths    10 mins ice pre and post session     Patient Education and Home Exercises     Home Exercises Provided and Patient Education Provided   Education provided:   - PT POC   - PT goals   - exercises/HEP    Written Home Exercises Provided: yes. Exercises were reviewed and Talya was able to demonstrate them prior to the end of the session.  Talya demonstrated good  understanding of the education provided. See EMR under Patient Instructions for exercises provided during therapy sessions    ASSESSMENT     Talya is a 58 y.o. female referred to outpatient Physical Therapy with a medical diagnosis of Contusion of right knee, subsequent encounter. Pt to session today with marked increase in s/s due to reports of having to negotiate numerous flights of stairs carrying her gear as the elevators at work were out. Due to this her session, activities were marked adjusted. Pre and post session with ice to reduce pain and all treatment activities were performed with bias on open > closed chain. Pt left session today reporting feeling better than when she walked in. Pt has 1 additional authorized PT " session and will attempt to return to prior treatment interventions if tolerated. Pt will also be f/u with the provider in the near future.     Talya Is progressing well towards her goals.   Pt prognosis is Good.     Pt will continue to benefit from skilled outpatient physical therapy to address the deficits listed in the problem list box on initial evaluation, provide pt/family education and to maximize pt's level of independence in the home and community environment.     Pt's spiritual, cultural and educational needs considered and pt agreeable to plan of care and goals.     Anticipated barriers to physical therapy: work conditions    Goals:   Short Term Goals:  4 weeks  1.Report decreased R knee pain < / =  8/10  to increase tolerance for walking  2. Increase PROM R knee   3. Increased strength by 1/3 MMT grade in R knee to increase tolerance for ADL and work activities.  4. Pt to tolerate HEP to improve ROM and independence with ADL's     Long Term Goals: 8 weeks  1.Report decreased R knee pain  < / =  2 /10  to increase tolerance for WALKING  2.Increase AROM to WNL R KNEE  3.Increase strength to >/= 4/5 in R LE  to increase tolerance for ADL and work activities.  4. Pt goal: pain-free walking/stairs  5. Pt will have improved gcode of CJ (20-40% limited) on FOTO shoulder in order to demonstrate true functional improvement.     PLAN     Con per POC.    Bimal Cristina, PT

## 2023-05-11 NOTE — LETTER
[facility]  [address] [address]  Phone: [phone]  Fax: [phone]  [facility]: [email or web address]    Pt Name: Talya Bates  Injury Date: 03/10/2023   Employee ID: [id number] Date of Treatment: 05/11/2023   Company: [employer]      Appointment Time: 09:30 AM Arrived: 9:15 AM    Provider: Barrington Prieto, DO Time Out: 10:21 AM      Office Treatment:   1. Contusion of right knee, subsequent encounter    2. Encounter related to worker's compensation claim    3. Muscle strain of right lower leg, initial encounter               Restrictions: No Prolonged standing/walking, Sit or stand as needed     Return Appointment: 6/1/2023 at 9:00 AM JUAN MANUEL

## 2023-05-12 ENCOUNTER — CLINICAL SUPPORT (OUTPATIENT)
Dept: REHABILITATION | Facility: HOSPITAL | Age: 58
End: 2023-05-12
Payer: COMMERCIAL

## 2023-05-12 DIAGNOSIS — M25.561 CHRONIC PAIN OF RIGHT KNEE: Primary | ICD-10-CM

## 2023-05-12 DIAGNOSIS — G89.29 CHRONIC PAIN OF RIGHT KNEE: Primary | ICD-10-CM

## 2023-05-12 PROCEDURE — 97110 THERAPEUTIC EXERCISES: CPT | Mod: PN

## 2023-05-12 PROCEDURE — 97140 MANUAL THERAPY 1/> REGIONS: CPT | Mod: PN

## 2023-05-12 NOTE — PROGRESS NOTES
[facility]   Physical Therapy Treatment Note     Name: Talya Bates  Clinic Number: [id number]    Therapy Diagnosis:   Encounter Diagnosis   Name Primary?    Chronic pain of right knee Yes     Physician: Barrington Prieto DO    Visit Date: 5/12/2023    Physician Orders: PT Eval and Treat   Medical Diagnosis from Referral: Contusion of right knee, subsequent encounter   Evaluation Date: 3/30/2023  Authorization Period Expiration: 03/27/2023 03/20/2024   Plan of Care Expiration: 6/30/23  Visit # / Visits authorized: 12/12  PTA Visit #: 0/5   FOTO: 2/5    Time In: 11:00am  Time Out: 12:00am   Total Billable Time: 60 minutes (3TE, 1MT), 10 mins ice pre and post session today due to subj c/o    SUBJECTIVE     Pt reports: Since last visit the pt reports that she has followed up with her referring provider and a new PT order has been placed (confirmed within [facility]). Patient reports feeling much better than her last visit and feels ready to push it and work today. Minor discomfort about the medial knee (points to joint line).   She was compliant with home exercise program.  Response to previous treatment: knee felt much better after her last visit   Functional change: none significant at this time    Pain: 5-6/10  Location: right knee anteriorly     OBJECTIVE     Objective Measures updated at progress report unless specified.     AROM//PROM  R knee [id number]//0-0-134*p  MMT flexion/extension//5  R knee 4/4//5  Palpation: Pt with mild ttp to R knee joint line and proximal medial gastrocnemius head  No pain with OP extension, mild discomfort OP flexion, (-) Raeann, no catching/locking of the knee    Treatment     Talya received the treatments listed below:      manual therapy techniques: Joint mobilizations, Manual traction, Myofacial release, Soft tissue Mobilization, and Friction Massage were applied to the: right knee for 10 minutes, including:    Prone Astym to the posterior chain   Prone  "STM to the G/S complex post Astym completion    Supine R knee flexion JM Gr IV 3x40  Supine R patellar glides in all directions Gr IV 3x25  Seated distraction with PROM 3x30"     therapeutic exercises to develop strength, endurance, ROM, flexibility, posture, and core stabilization for 50 minutes including:  LAQ 3# wt 3x10   Supine bridging 3x10   SLR R 3x8, 1.5#  SL clam vs GTB 3x10   Prone Quad stretch 15x10'' holds with strap  Prone HS curl 2x12     Leg press: 2x20 double leg, 5 plates, sled=5    Fitter G/S stretch 3x30" each     Standing B heel raises x30 off ramp  Standing B hip abduction 2x10 on blue theraband pad  Nustep x5 mins - pre and post session    Cybex HC curl 4 plates 3x10   Cybex knee extension 15# 2x10    Talya participated in neuromuscular re-education activities to improve: Coordination, Kinesthetic, and Proprioception for 00 minutes. The following activities were included:    R Side Lying R hip internal Rotation Stretch 10x10"  Seated R hip Pull Back with R ADD squeeze (hand out issued)   Supine Hook lying Bridges with PPT emphasis  2x10"    L Side Lying R hip Pull back with hip ADD + IR 5x5 breaths    Patient Education and Home Exercises     Home Exercises Provided and Patient Education Provided   Education provided:   - PT POC   - PT goals   - exercises/HEP    Written Home Exercises Provided: yes. Exercises were reviewed and Talya was able to demonstrate them prior to the end of the session.  Talya demonstrated good  understanding of the education provided. See EMR under Patient Instructions for exercises provided during therapy sessions    ASSESSMENT     Talya is a 58 y.o. female referred to outpatient Physical Therapy with a medical diagnosis of Contusion of right knee, subsequent encounter. Pt to session today with marked decrease in s/s since last visit and noted positive carry over from last visit as well. Have also communicated with referring provider and the pt has seen her " referring provider since last visit; a new PT order has been placed and has been submitted to her adjustor. As soon as authorization is received will contact pt and schedule follow up visits.     Talya Is progressing well towards her goals.   Pt prognosis is Good.     Pt will continue to benefit from skilled outpatient physical therapy to address the deficits listed in the problem list box on initial evaluation, provide pt/family education and to maximize pt's level of independence in the home and community environment.     Pt's spiritual, cultural and educational needs considered and pt agreeable to plan of care and goals.     Anticipated barriers to physical therapy: work conditions    Goals:   Short Term Goals:  4 weeks  1.Report decreased R knee pain < / =  8/10  to increase tolerance for walking  2. Increase PROM R knee   3. Increased strength by 1/3 MMT grade in R knee to increase tolerance for ADL and work activities.  4. Pt to tolerate HEP to improve ROM and independence with ADL's     Long Term Goals: 8 weeks  1.Report decreased R knee pain  < / =  2 /10  to increase tolerance for WALKING  2.Increase AROM to WNL R KNEE  3.Increase strength to >/= 4/5 in R LE  to increase tolerance for ADL and work activities.  4. Pt goal: pain-free walking/stairs  5. Pt will have improved gcode of CJ (20-40% limited) on FOTO shoulder in order to demonstrate true functional improvement.     PLAN     Con per POC.    Bimal Cristina, PT

## 2023-05-22 ENCOUNTER — CLINICAL SUPPORT (OUTPATIENT)
Dept: REHABILITATION | Facility: HOSPITAL | Age: 58
End: 2023-05-22
Payer: COMMERCIAL

## 2023-05-22 DIAGNOSIS — M25.561 CHRONIC PAIN OF RIGHT KNEE: Primary | ICD-10-CM

## 2023-05-22 DIAGNOSIS — G89.29 CHRONIC PAIN OF RIGHT KNEE: Primary | ICD-10-CM

## 2023-05-22 PROCEDURE — 97140 MANUAL THERAPY 1/> REGIONS: CPT | Mod: PN

## 2023-05-22 PROCEDURE — 97110 THERAPEUTIC EXERCISES: CPT | Mod: PN

## 2023-05-22 PROCEDURE — 97530 THERAPEUTIC ACTIVITIES: CPT | Mod: PN

## 2023-05-22 NOTE — PROGRESS NOTES
[facility]   Physical Therapy Treatment Note     Name: Talya Bates  Clinic Number: [id number]    Therapy Diagnosis:   Encounter Diagnosis   Name Primary?    Chronic pain of right knee Yes     Physician: Barrington Prieto DO    Visit Date: 5/22/2023    Physician Orders: PT Eval and Treat   Medical Diagnosis from Referral: Contusion of right knee, subsequent encounter   Evaluation Date: 3/30/2023  Authorization Period Expiration: 03/27/2023 03/20/2024   Plan of Care Expiration: 6/30/23  Visit # / Visits authorized: 12/12, 1/12  PTA Visit #: 0/5   FOTO: 2/5    Time In: 11:05am  Time Out: 12:05pm   Total Billable Time: 60 minutes (2TE, 2TA, 1MT)     SUBJECTIVE     Pt reports: Since last visit (10 days ago) she has been awaiting insurance authorization. Reports that she has no changes in work status/duty and has been keeping up with her HEP daily since last visit. Still feels as though she is getting much stronger and improving overall; feels much better than when she first started therapy. However, reports that she is having most challenges with negotiating stairs due to R knee discomfort.  She was compliant with home exercise program.  Response to previous treatment: knee felt much better after her last visit   Functional change: none significant at this time    Pain: 4/10  Location: right knee anteriorly     OBJECTIVE     Objective Measures updated at progress report unless specified.     AROM//PROM  R knee 0-0132//0-0-134*p  MMT flexion/extension//5  R knee 4/4//5  Palpation: Pt with mild ttp to R knee joint line and proximal medial gastrocnemius head  No pain with OP extension, mild discomfort OP flexion, (-) Raeann, no catching/locking of the knee    Treatment     Talya received the treatments listed below:      manual therapy techniques: Joint mobilizations, Manual traction, Myofacial release, Soft tissue Mobilization, and Friction Massage were applied to the: right knee for 10  "minutes, including:    Supine R knee flexion JM Gr IV 3x40  Supine R patellar glides in all directions Gr IV 3x25  Seated distraction with PROM 3x30"     therapeutic exercises to develop strength, endurance, ROM, flexibility, posture, and core stabilization for 25 minutes including:  LAQ 3# wt 3x10   Supine bridging 3x10   SLR R 3x10, 2# and L  SL clam vs GTB 3x10   Prone Quad stretch 15x10'' holds with strap  Prone HS curl 2x12   Fitter G/S stretch 3x30" each  Standing hip abduction x20 each  Cybex knee extension 15# 3x10  Cybex HC curl 4 plates 3x10   Leg press: 2x20 double leg, 5 plates, sled=5  Nustep x5 mins - post    therapeutic activities to improve functional performance for 25 minutes, including:  Decline squat from slant board 2x10   Lateral step ups to 6" step x20 each   Fwd step up to high knee x20 each   Standing B heel raises x30 from decline slant board  Modified split squat with HHA PRN x10 each     Patient Education and Home Exercises     Home Exercises Provided and Patient Education Provided   Education provided:   - PT POC   - PT goals   - exercises/HEP    Written Home Exercises Provided: yes. Exercises were reviewed and Talya was able to demonstrate them prior to the end of the session.  Talya demonstrated good  understanding of the education provided. See EMR under Patient Instructions for exercises provided during therapy sessions    ASSESSMENT     Talya is a 58 y.o. female referred to outpatient Physical Therapy with a medical diagnosis of Contusion of right knee, subsequent encounter.     Pt has received authorization for an additional 12 PT treatment sessions and has been scheduled out. She reported to session noting ongoing improvements in her overall condition; however, notes functionally most difficulty with stair negotiation. Due to this adjustments were made to treatment session today within pt tolerance. Pt with marked fatigue end of session today. Knee ROM appears to be " antonio Babin Is progressing well towards her goals.   Pt prognosis is Good.     Pt will continue to benefit from skilled outpatient physical therapy to address the deficits listed in the problem list box on initial evaluation, provide pt/family education and to maximize pt's level of independence in the home and community environment.     Pt's spiritual, cultural and educational needs considered and pt agreeable to plan of care and goals.     Anticipated barriers to physical therapy: work conditions    Goals:   Short Term Goals:  4 weeks  1.Report decreased R knee pain < / =  8/10  to increase tolerance for walking  2. Increase PROM R knee   3. Increased strength by 1/3 MMT grade in R knee to increase tolerance for ADL and work activities.  4. Pt to tolerate HEP to improve ROM and independence with ADL's     Long Term Goals: 8 weeks  1.Report decreased R knee pain  < / =  2 /10  to increase tolerance for WALKING  2.Increase AROM to WNL R KNEE  3.Increase strength to >/= 4/5 in R LE  to increase tolerance for ADL and work activities.  4. Pt goal: pain-free walking/stairs  5. Pt will have improved gcode of CJ (20-40% limited) on FOTO shoulder in order to demonstrate true functional improvement.     PLAN     Con per POC.    Bimal Cristina, PT

## 2023-05-26 ENCOUNTER — CLINICAL SUPPORT (OUTPATIENT)
Dept: REHABILITATION | Facility: HOSPITAL | Age: 58
End: 2023-05-26
Payer: COMMERCIAL

## 2023-05-26 DIAGNOSIS — M25.561 CHRONIC PAIN OF RIGHT KNEE: Primary | ICD-10-CM

## 2023-05-26 DIAGNOSIS — G89.29 CHRONIC PAIN OF RIGHT KNEE: Primary | ICD-10-CM

## 2023-05-26 PROCEDURE — 97530 THERAPEUTIC ACTIVITIES: CPT | Mod: PN

## 2023-05-26 PROCEDURE — 97110 THERAPEUTIC EXERCISES: CPT | Mod: PN

## 2023-05-26 NOTE — PROGRESS NOTES
[facility]   Physical Therapy Treatment Note     Name: Talya Bates  Clinic Number: [id number]    Therapy Diagnosis:   Encounter Diagnosis   Name Primary?    Chronic pain of right knee Yes     Physician: Barrington Prieto DO    Visit Date: 5/26/2023    Physician Orders: PT Eval and Treat   Medical Diagnosis from Referral: Contusion of right knee, subsequent encounter   Evaluation Date: 3/30/2023  Authorization Period Expiration: 03/27/2023 03/20/2024   Plan of Care Expiration: 6/30/23  Visit # / Visits authorized: 12/12, 2/12  PTA Visit #: 0/5   FOTO: 2/5    Time In: 2:00pm  Time Out: 3:04pm   Total Billable Time: 64 minutes (3TA, 2TE)    SUBJECTIVE     Pt reports: Reports she has been doing really great since last visit, feels as though she is really getting stronger and more mobility out of her knee. Though she does report ongoing difficulties and weakness with prolonged stair negotiation as well as occasional tightness about the anterior knee (R).    She was compliant with home exercise program.  Response to previous treatment: knee felt much better after her last visit   Functional change: none significant at this time    Pain: 4/10  Location: right knee anteriorly     OBJECTIVE     Objective Measures updated at progress report unless specified.     AROM//PROM  R knee 0-0132//0-0-134*p  MMT flexion/extension//5  R knee 4/4//5  Palpation: Pt with mild ttp to R knee joint line and proximal medial gastrocnemius head  No pain with OP extension, mild discomfort OP flexion, (-) Raeann, no catching/locking of the knee    Treatment     Tlaya received the treatments listed below:      manual therapy techniques: Joint mobilizations, Manual traction, Myofacial release, Soft tissue Mobilization, and Friction Massage were applied to the: right knee for 0 minutes, including:    Supine R knee flexion JM Gr IV 3x40  Supine R patellar glides in all directions Gr IV 3x25  Seated distraction with  "PROM 3x30"     therapeutic exercises to develop strength, endurance, ROM, flexibility, posture, and core stabilization for 26 minutes including:  Supine bridging 3x10   SLR R 3x10, 2# and L  Fitter G/S stretch 3x30" each  Standing hip abduction x20 each  Cybex knee extension 15# 3x10  Cybex HC curl 4 plates 3x10   Leg press: 2x20 double leg, 5 plates, sled=5  Nustep x10 mins - post session    therapeutic activities to improve functional performance for 40 minutes, including:  Decline squat from slant board 2x10   Lateral step ups to 6" step x20 each   Fwd step up to high knee x20 each   Standing B heel raises x30 from decline slant board  Modified split squat with HHA PRN x10 each   Standing <> Tall kneeling t/f x5 + education regarding     Patient Education and Home Exercises     Home Exercises Provided and Patient Education Provided   Education provided:   - PT POC   - PT goals   - exercises/HEP    Written Home Exercises Provided: yes. Exercises were reviewed and Talya was able to demonstrate them prior to the end of the session.  Talya demonstrated good  understanding of the education provided. See EMR under Patient Instructions for exercises provided during therapy sessions    ASSESSMENT     Talya is a 58 y.o. female referred to outpatient Physical Therapy with a medical diagnosis of Contusion of right knee, subsequent encounter.     Pt to session noting marked improvements in condition with ongoing functional deficits including stair negotiation and reports of occasional anterior R knee tightness. Due to pt subjective reports, increased functional activities and resistance with strengthening activities with no adverse response though pt did report marked fatigue post session as would expect. Pt reluctant to complete standing to tall kneeling to standing t/f initially; however, with positive reinforcement and encouragement, pt was able to complete 5 reps with UE use. Pt encouraged by results and left " treatment session reporting feeling accomplished. Continue to progress functionally as tolerated with emphasis on functional strengthening activities.     Talya Is progressing well towards her goals.   Pt prognosis is Good.     Pt will continue to benefit from skilled outpatient physical therapy to address the deficits listed in the problem list box on initial evaluation, provide pt/family education and to maximize pt's level of independence in the home and community environment.     Pt's spiritual, cultural and educational needs considered and pt agreeable to plan of care and goals.     Anticipated barriers to physical therapy: work conditions    Goals:   Short Term Goals:  4 weeks  1.Report decreased R knee pain < / =  8/10  to increase tolerance for walking  2. Increase PROM R knee   3. Increased strength by 1/3 MMT grade in R knee to increase tolerance for ADL and work activities.  4. Pt to tolerate HEP to improve ROM and independence with ADL's     Long Term Goals: 8 weeks  1.Report decreased R knee pain  < / =  2 /10  to increase tolerance for WALKING  2.Increase AROM to WNL R KNEE  3.Increase strength to >/= 4/5 in R LE  to increase tolerance for ADL and work activities.  4. Pt goal: pain-free walking/stairs  5. Pt will have improved gcode of CJ (20-40% limited) on FOTO shoulder in order to demonstrate true functional improvement.     PLAN     Con per POC.    Bimal Cristina, PT

## 2023-06-01 ENCOUNTER — OFFICE VISIT (OUTPATIENT)
Dept: URGENT CARE | Facility: CLINIC | Age: 58
End: 2023-06-01
Payer: COMMERCIAL

## 2023-06-01 VITALS
HEIGHT: 67 IN | DIASTOLIC BLOOD PRESSURE: 80 MMHG | SYSTOLIC BLOOD PRESSURE: 121 MMHG | TEMPERATURE: 98 F | WEIGHT: 205 LBS | HEART RATE: 73 BPM | OXYGEN SATURATION: 98 % | BODY MASS INDEX: 32.18 KG/M2

## 2023-06-01 DIAGNOSIS — Z02.6 ENCOUNTER RELATED TO WORKER'S COMPENSATION CLAIM: ICD-10-CM

## 2023-06-01 DIAGNOSIS — S86.811D STRAIN OF CALF MUSCLE, RIGHT, SUBSEQUENT ENCOUNTER: ICD-10-CM

## 2023-06-01 DIAGNOSIS — S80.01XD CONTUSION OF RIGHT KNEE, SUBSEQUENT ENCOUNTER: Primary | ICD-10-CM

## 2023-06-01 DIAGNOSIS — M54.50 ACUTE RIGHT-SIDED LOW BACK PAIN WITHOUT SCIATICA: ICD-10-CM

## 2023-06-01 PROCEDURE — 99214 PR OFFICE/OUTPT VISIT, EST, LEVL IV, 30-39 MIN: ICD-10-PCS | Mod: S$GLB,,, | Performed by: STUDENT IN AN ORGANIZED HEALTH CARE EDUCATION/TRAINING PROGRAM

## 2023-06-01 PROCEDURE — 99214 OFFICE O/P EST MOD 30 MIN: CPT | Mod: S$GLB,,, | Performed by: STUDENT IN AN ORGANIZED HEALTH CARE EDUCATION/TRAINING PROGRAM

## 2023-06-01 RX ORDER — DICLOFENAC SODIUM 10 MG/G
2 GEL TOPICAL 4 TIMES DAILY
Qty: 150 G | Refills: 1 | Status: SHIPPED | OUTPATIENT
Start: 2023-06-01 | End: 2023-07-17 | Stop reason: SDUPTHER

## 2023-06-01 NOTE — LETTER
[facility]  [address] [address]  Phone: [phone]  Fax: [phone]  [facility] [employer]: [phone]    Pt Name: Talya Bates  Injury Date: 03/10/2023   Employee ID: [id number] Date of Treatment: 06/01/2023   Company: [employer]      Appointment Time: 09:00 AM Arrived: 9:00 AM    Provider: Erika Winslow MD Time Out: 9:59 AM      Office Treatment:   1. Contusion of right knee, subsequent encounter    2. Encounter related to worker's compensation claim    3. Strain of calf muscle, right, subsequent encounter    4. Acute right-sided low back pain without sciatica      Medications Ordered This Encounter   Medications    diclofenac sodium (VOLTAREN) 1 % Gel      Patient Instructions: Continue Physical Therapy      Restrictions: No Prolonged standing/walking, Sit or stand as needed, No lifting/pushing/pulling more than 25 lbs (No climbing stairs.)     Return Appointment: 7/3/2023 at 8:30 AM JUAN MANUEL

## 2023-06-05 ENCOUNTER — CLINICAL SUPPORT (OUTPATIENT)
Dept: REHABILITATION | Facility: HOSPITAL | Age: 58
End: 2023-06-05
Payer: COMMERCIAL

## 2023-06-05 DIAGNOSIS — M25.561 CHRONIC PAIN OF RIGHT KNEE: Primary | ICD-10-CM

## 2023-06-05 DIAGNOSIS — G89.29 CHRONIC PAIN OF RIGHT KNEE: Primary | ICD-10-CM

## 2023-06-05 PROCEDURE — 97110 THERAPEUTIC EXERCISES: CPT

## 2023-06-05 PROCEDURE — 97530 THERAPEUTIC ACTIVITIES: CPT

## 2023-06-05 PROCEDURE — 97140 MANUAL THERAPY 1/> REGIONS: CPT

## 2023-06-05 NOTE — PROGRESS NOTES
[facility]   Physical Therapy Treatment Note     Name: Talya Bates  Clinic Number: [id number]    Therapy Diagnosis:   Encounter Diagnosis   Name Primary?    Chronic pain of right knee Yes     Physician: Barrington Prieto DO    Visit Date: 6/5/2023    Physician Orders: PT Eval and Treat   Medical Diagnosis from Referral: Contusion of right knee, subsequent encounter   Evaluation Date: 3/30/2023  Authorization Period Expiration: 03/27/2023 03/20/2024   Plan of Care Expiration: 6/30/23  Visit # / Visits authorized: 12/12, 3/12  PTA Visit #: 0/5   FOTO: 2/5    Time In: 1:50pm  Time Out: 3:00pm   Total Billable Time: 70 minutes (3TA, 1TE, 1MT)    SUBJECTIVE     Pt reports: Reports doing well overall since last visit. Has not had any significant episodes of s/s exacerbation. Still most difficulties with prolonged stair negotiation - feels weakness about B LE. Also reports having a little mild R sided LBP - maybe compensating with activities at work. Ultimately feels as though progressing well and getting stronger and stronger.   She was compliant with home exercise program.  Response to previous treatment: knee felt much better after her last visit   Functional change: none significant at this time    Pain: 4/10  Location: right knee anteriorly     OBJECTIVE     Objective Measures updated at progress report unless specified.     AROM//PROM  R knee 0-0132//0-0-134*p  MMT flexion/extension//5  R knee 4/4//5  Palpation: Pt with mild ttp to R knee joint line and proximal medial gastrocnemius head  No pain with OP extension, mild discomfort OP flexion, (-) Raeann, no catching/locking of the knee    Treatment     Talya received the treatments listed below:      manual therapy techniques: Joint mobilizations, Manual traction, Myofacial release, Soft tissue Mobilization, and Friction Massage were applied to the: right knee for 10 minutes, including:  Supine R knee flexion JM Gr IV 3x40  Supine  "R patellar glides in all directions Gr IV 3x25  Seated distraction with PROM 3x30"   R LE LAD 4x60"    therapeutic exercises to develop strength, endurance, ROM, flexibility, posture, and core stabilization for 15 minutes including:  Supine bridging 3x10   Fitter G/S stretch 3x30" each  Standing B heel raises x30 from decline slant board  Shuttle squats 2x10     therapeutic activities to improve functional performance for 45 minutes, including:  Decline squat from slant board 2x10   Lateral step ups to 6" step x20 each   Fwd step up to high knee x20 each   Modified split squat with HHA PRN x10 each   Squats to plyobox 20" holding onto 7# wt 2x10   Hip hinge deadlift with 10# KB 2x10   Standing <> Tall kneeling t/f x5 + education regarding     Patient Education and Home Exercises     Home Exercises Provided and Patient Education Provided   Education provided:   - PT POC   - PT goals   - exercises/HEP    Written Home Exercises Provided: yes. Exercises were reviewed and Talya was able to demonstrate them prior to the end of the session.  Talya demonstrated good  understanding of the education provided. See EMR under Patient Instructions for exercises provided during therapy sessions    ASSESSMENT     Talya is a 58 y.o. female referred to outpatient Physical Therapy with a medical diagnosis of Contusion of right knee, subsequent encounter.     Pt to session noting marked improvements in condition with ongoing functional deficits including stair negotiation and reports of occasional anterior R knee tightness. Due to pt subjective reports, increased functional activities and resistance with strengthening activities with no adverse response though pt did report marked fatigue post session as would expect. Pt reluctant to complete standing to tall kneeling to standing t/f initially; however, with positive reinforcement and encouragement, pt was able to complete 5 reps with UE use. Pt encouraged by results and left " treatment session reporting feeling accomplished. Continue to progress functionally as tolerated with emphasis on functional strengthening activities.     Talya Is progressing well towards her goals.   Pt prognosis is Good.     Pt will continue to benefit from skilled outpatient physical therapy to address the deficits listed in the problem list box on initial evaluation, provide pt/family education and to maximize pt's level of independence in the home and community environment.     Pt's spiritual, cultural and educational needs considered and pt agreeable to plan of care and goals.     Anticipated barriers to physical therapy: work conditions    Goals:   Short Term Goals:  4 weeks  1.Report decreased R knee pain < / =  8/10  to increase tolerance for walking  2. Increase PROM R knee   3. Increased strength by 1/3 MMT grade in R knee to increase tolerance for ADL and work activities.  4. Pt to tolerate HEP to improve ROM and independence with ADL's     Long Term Goals: 8 weeks  1.Report decreased R knee pain  < / =  2 /10  to increase tolerance for WALKING  2.Increase AROM to WNL R KNEE  3.Increase strength to >/= 4/5 in R LE  to increase tolerance for ADL and work activities.  4. Pt goal: pain-free walking/stairs  5. Pt will have improved gcode of CJ (20-40% limited) on FOTO shoulder in order to demonstrate true functional improvement.     PLAN     Con per POC.    Bimal Cristina, PT

## 2023-06-09 ENCOUNTER — CLINICAL SUPPORT (OUTPATIENT)
Dept: REHABILITATION | Facility: HOSPITAL | Age: 58
End: 2023-06-09
Payer: COMMERCIAL

## 2023-06-09 DIAGNOSIS — G89.29 CHRONIC PAIN OF RIGHT KNEE: Primary | ICD-10-CM

## 2023-06-09 DIAGNOSIS — M25.561 CHRONIC PAIN OF RIGHT KNEE: Primary | ICD-10-CM

## 2023-06-09 PROCEDURE — 97140 MANUAL THERAPY 1/> REGIONS: CPT

## 2023-06-09 PROCEDURE — 97110 THERAPEUTIC EXERCISES: CPT

## 2023-06-09 NOTE — PROGRESS NOTES
[facility]   Physical Therapy Treatment Note     Name: Talya Bates  Clinic Number: [id number]    Therapy Diagnosis:   Encounter Diagnosis   Name Primary?    Chronic pain of right knee Yes     Physician: Barrington Prieto DO    Visit Date: 6/9/2023    Physician Orders: PT Eval and Treat   Medical Diagnosis from Referral: Contusion of right knee, subsequent encounter   Evaluation Date: 3/30/2023  Authorization Period Expiration: 03/27/2023 03/20/2024   Plan of Care Expiration: 6/30/23  Visit # / Visits authorized: 12/12, 5/12  PTA Visit #: 0/5   FOTO: 2/5    Time In: 1:55pm  Time Out: 2:50pm   Total Billable Time: 55 minutes (3TE, 1MT)    SUBJECTIVE     Pt reports: Reports doing well overall since last visit and lower back doing better as compared to last visit as well. Knee is feeling a little more achy as today she has been very busy at work as she had another building added to her workload, more tired due to this as well and her calf is feeling a bit tight as well (R). Still most difficulties with prolonged stair negotiation - feels weakness about B LE.   She was compliant with home exercise program.  Response to previous treatment: knee felt much better after her last visit   Functional change: none significant at this time    Pain: 6/10  Location: right knee anteriorly     OBJECTIVE     Objective Measures updated at progress report unless specified.     AROM//PROM  R knee 0-0132//0-0-134*p  MMT flexion/extension//5  R knee 4/4//5  Palpation: Pt with mild ttp to R knee joint line and proximal medial gastrocnemius head  No pain with OP extension, mild discomfort OP flexion, (-) Raeann, no catching/locking of the knee    Treatment     Talya received the treatments listed below:      manual therapy techniques: Joint mobilizations, Manual traction, Myofacial release, Soft tissue Mobilization, and Friction Massage were applied to the: right knee for 10 minutes, including:  Supine R  "knee flexion JM Gr IV 3x40  Supine R patellar glides in all directions Gr IV 3x25  Seated distraction with PROM 3x30"   R HS/G/S stretching 3x30"  R LE LAD 4x60"    therapeutic exercises to develop strength, endurance, ROM, flexibility, posture, and core stabilization for 15 minutes including:  +Scifit stepper x5 mins focusing on max ROM  Supine bridging 3x10   Fitter G/S stretch 3x30" each  Standing B heel raises x30 from decline slant board  Shuttle squats 2x10   + Supine bridging 2x10   + Supine SLR R and L 2x10 each   + SL clams BTB 2x10 each   + Sled squats 4x10 3 resistance bands   +Matrix Bike for ROM     therapeutic activities to improve functional performance for 45 minutes, including:  Decline squat from slant board 2x10   Lateral step ups to 6" step x20 each   Fwd step up to high knee x20 each   Modified split squat with HHA PRN x10 each   Squats to plyobox 20" holding onto 7# wt 2x10   Hip hinge deadlift with 10# KB 2x10   Standing <> Tall kneeling t/f x5 + education regarding     Patient Education and Home Exercises     Home Exercises Provided and Patient Education Provided   Education provided:   - PT POC   - PT goals   - exercises/HEP    Written Home Exercises Provided: yes. Exercises were reviewed and Talya was able to demonstrate them prior to the end of the session.  Talya demonstrated good  understanding of the education provided. See EMR under Patient Instructions for exercises provided during therapy sessions    ASSESSMENT     Talya is a 58 y.o. female referred to outpatient Physical Therapy with a medical diagnosis of Contusion of right knee, subsequent encounter.     Pt to session noting marked improvements in condition with ongoing functional deficits including stair negotiation and reports of occasional anterior R knee tightness. 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 Discussed load vs capacity with patient and how relates to therapy session/rehabilitation. Due to this modified marked therapy treatment today to primarily focused on open chain activities and ROM with marked decrease in functional program per pt tolerance. Continue to progress functionally as tolerated with emphasis on functional strengthening activities.     Talya Is progressing well towards her goals.   Pt prognosis is Good.     Pt will continue to benefit from skilled outpatient physical therapy to address the deficits listed in the problem list box on initial evaluation, provide pt/family education and to maximize pt's level of independence in the home and community environment.     Pt's spiritual, cultural and educational needs considered and pt agreeable to plan of care and goals.     Anticipated barriers to physical therapy: work conditions    Goals:   Short Term Goals:  4 weeks  1.Report decreased R knee pain < / =  8/10  to increase tolerance for walking  2. Increase PROM R knee   3. Increased strength by 1/3 MMT grade in R knee to increase tolerance for ADL and work activities.  4. Pt to tolerate HEP to improve ROM and independence with ADL's     Long Term Goals: 8 weeks  1.Report decreased R knee pain  < / =  2 /10  to increase tolerance for WALKING  2.Increase AROM to WNL R KNEE  3.Increase strength to >/= 4/5 in R LE  to increase tolerance for ADL and work activities.  4. Pt goal: pain-free walking/stairs  5. Pt will have improved gcode of CJ (20-40% limited) on FOTO shoulder in order to demonstrate true functional improvement.     PLAN     Con per POC.    Bimal Cristina, PT

## 2023-06-12 ENCOUNTER — CLINICAL SUPPORT (OUTPATIENT)
Dept: REHABILITATION | Facility: HOSPITAL | Age: 58
End: 2023-06-12
Payer: COMMERCIAL

## 2023-06-12 DIAGNOSIS — M25.561 CHRONIC PAIN OF RIGHT KNEE: Primary | ICD-10-CM

## 2023-06-12 DIAGNOSIS — G89.29 CHRONIC PAIN OF RIGHT KNEE: Primary | ICD-10-CM

## 2023-06-12 PROCEDURE — 97530 THERAPEUTIC ACTIVITIES: CPT

## 2023-06-12 PROCEDURE — 97110 THERAPEUTIC EXERCISES: CPT

## 2023-06-12 PROCEDURE — 97140 MANUAL THERAPY 1/> REGIONS: CPT

## 2023-06-12 NOTE — PROGRESS NOTES
[facility]   Physical Therapy Treatment Note     Name: Talya Bates  Clinic Number: [id number]    Therapy Diagnosis:   Encounter Diagnosis   Name Primary?    Chronic pain of right knee Yes     Physician: Barrington Prieto DO    Visit Date: 6/12/2023    Physician Orders: PT Eval and Treat   Medical Diagnosis from Referral: Contusion of right knee, subsequent encounter   Evaluation Date: 3/30/2023  Authorization Period Expiration: 03/27/2023 03/20/2024   Plan of Care Expiration: 6/30/23  Visit # / Visits authorized: 12/12, 6/12  PTA Visit #: 0/5   FOTO: 2/5    Time In: 1:55pm  Time Out: 3:00pm  Total Billable Time: 65 minutes (2TE, 2TA, 1MT)    SUBJECTIVE     Pt reports: Reports doing much better today as compared to last visit. Both her knee and calf are doing better; thinks last visit increase in pain was due to having to work another building on her schedule/increased workload. Still most difficulties with prolonged stair negotiation, but improving. Feels weakness about B LE.   She was compliant with home exercise program.  Response to previous treatment: knee felt much better after her last visit   Functional change: none significant at this time    Pain: 4/10  Location: right knee anteriorly     OBJECTIVE     Objective Measures updated at progress report unless specified.     AROM//PROM  R knee 0-0132//0-0-134*p  MMT flexion/extension//5  R knee 4/4//5  Palpation: Pt with mild ttp to R knee joint line and proximal medial gastrocnemius head  No pain with OP extension, mild discomfort OP flexion, (-) Raeann, no catching/locking of the knee    Treatment     Talya received the treatments listed below:      manual therapy techniques: Joint mobilizations, Manual traction, Myofacial release, Soft tissue Mobilization, and Friction Massage were applied to the: right knee for 10 minutes, including:  Supine R knee flexion JM Gr IV 3x40  Supine R patellar glides in all directions Gr IV  "3x25  Seated distraction with PROM 3x30"   R HS/G/S stretching 3x30"  R LE LAD 4x60"    therapeutic exercises to develop strength, endurance, ROM, flexibility, posture, and core stabilization for 25 minutes including:  Scifit stepper x5 mins focusing on max ROM  Supine bridging 3x10   Fitter G/S stretch 3x30" each  Standing B heel raises x30 from decline slant board  Shuttle squats 2x10   Supine bridging 2x10   Matrix Bike for ROM x5 mins    therapeutic activities to improve functional performance for 30 minutes, including:  Decline squat from slant board 2x10   Lateral step ups to 6" step x20 each   Fwd step up to high knee x20 each   Modified split squat with HHA PRN 2x5 each   Squats to plyobox 20" holding onto 7# wt 2x10   Hip hinge deadlift with 10# KB 2x10   Fwd step ups t o 8" step x10 each   Sled squats 4x10 3 resistance bands   Standing <> Tall kneeling t/f x5 + education regarding     Patient Education and Home Exercises     Home Exercises Provided and Patient Education Provided   Education provided:   - PT POC   - PT goals   - exercises/HEP    Written Home Exercises Provided: yes. Exercises were reviewed and Talya was able to demonstrate them prior to the end of the session.  Talya demonstrated good  understanding of the education provided. See EMR under Patient Instructions for exercises provided during therapy sessions    ASSESSMENT     Talya is a 58 y.o. female referred to outpatient Physical Therapy with a medical diagnosis of Contusion of right knee, subsequent encounter.     Pt to session noting marked improvements in condition with ongoing functional deficits including stair negotiation and reports of occasional anterior R knee tightness. Last visit treatment session modified due to pt reporting to session with high irritability/severity of s/s. However, pt to session today with decreased NPRS score as compared to last visit and added treatment activities back into treatment session with " emphasis on functional activities. Continue to progress functionally as tolerated with emphasis on functional strengthening activities.     Talya Is progressing well towards her goals.   Pt prognosis is Good.     Pt will continue to benefit from skilled outpatient physical therapy to address the deficits listed in the problem list box on initial evaluation, provide pt/family education and to maximize pt's level of independence in the home and community environment.     Pt's spiritual, cultural and educational needs considered and pt agreeable to plan of care and goals.     Anticipated barriers to physical therapy: work conditions    Goals:   Short Term Goals:  4 weeks  1.Report decreased R knee pain < / =  8/10  to increase tolerance for walking  2. Increase PROM R knee   3. Increased strength by 1/3 MMT grade in R knee to increase tolerance for ADL and work activities.  4. Pt to tolerate HEP to improve ROM and independence with ADL's     Long Term Goals: 8 weeks  1.Report decreased R knee pain  < / =  2 /10  to increase tolerance for WALKING  2.Increase AROM to WNL R KNEE  3.Increase strength to >/= 4/5 in R LE  to increase tolerance for ADL and work activities.  4. Pt goal: pain-free walking/stairs  5. Pt will have improved gcode of CJ (20-40% limited) on FOTO shoulder in order to demonstrate true functional improvement.     PLAN     Con per POC.    Bimal Cristina, PT

## 2023-06-16 ENCOUNTER — CLINICAL SUPPORT (OUTPATIENT)
Dept: REHABILITATION | Facility: HOSPITAL | Age: 58
End: 2023-06-16
Payer: COMMERCIAL

## 2023-06-16 DIAGNOSIS — M25.561 CHRONIC PAIN OF RIGHT KNEE: Primary | ICD-10-CM

## 2023-06-16 DIAGNOSIS — G89.29 CHRONIC PAIN OF RIGHT KNEE: Primary | ICD-10-CM

## 2023-06-16 PROCEDURE — 97140 MANUAL THERAPY 1/> REGIONS: CPT

## 2023-06-16 PROCEDURE — 97530 THERAPEUTIC ACTIVITIES: CPT

## 2023-06-16 PROCEDURE — 97110 THERAPEUTIC EXERCISES: CPT

## 2023-06-16 NOTE — PROGRESS NOTES
"[facility]   Physical Therapy Treatment Note     Name: Talya Bates  Clinic Number: [id number]    Therapy Diagnosis:   Encounter Diagnosis   Name Primary?    Chronic pain of right knee Yes     Physician: Barrington Prieto DO    Visit Date: 6/16/2023    Physician Orders: PT Eval and Treat   Medical Diagnosis from Referral: Contusion of right knee, subsequent encounter   Evaluation Date: 3/30/2023  Authorization Period Expiration: 03/27/2023 03/20/2024   Plan of Care Expiration: 6/30/23  Visit # / Visits authorized: 12/12, 7/12  PTA Visit #: 0/5   FOTO: 3/5    Time In: 1:55pm  Time Out: 3:00pm 65  Total Billable Time: 65 minutes (2TE, 2TA, 1MT)    SUBJECTIVE     Pt reports: Reports doing ok today with no pain reported about the knee; however, still with difficulties with stair negotiation at work. Feels due to stiffness primarily when ascending steps - across the anterior knee.     She was compliant with home exercise program.  Response to previous treatment: knee felt much better after her last visit   Functional change: improved ADLs and work tolerance    Pain: 0/10  Location: right knee anteriorly     OBJECTIVE     Objective Measures updated at progress report unless specified.     AROM//PROM  R knee 0-0132//0-0-134*p  MMT flexion/extension//5  R knee 4/4//5  Palpation: Pt with mild ttp to R knee joint line and proximal medial gastrocnemius head  No pain with OP extension, mild discomfort OP flexion, (-) Raeann, no catching/locking of the knee    Treatment     Talya received the treatments listed below:      manual therapy techniques: Joint mobilizations, Manual traction, Myofacial release, Soft tissue Mobilization, and Friction Massage were applied to the: right knee for 10 minutes, including:  Supine R knee flexion JM Gr IV 3x40  Supine R patellar glides in all directions Gr IV 3x25  Seated distraction with PROM 3x30"   R HS/G/S stretching 3x30"  R LE LAD 4x60"    therapeutic " "exercises to develop strength, endurance, ROM, flexibility, posture, and core stabilization for 25 minutes including:  Scifit stepper x5 mins focusing on max ROM  Supine bridging 3x10   SL Clams BTB 2x10 each side  Fitter Soleus stretch 1x30" each  Fitter G/S stretch 3x30" Bebo  Standing B heel raises x30 from decline slant board (pt reported left triceps cramping during activity which she related to having to scrub floors with more vigor today due to having to clean scuff marks, provided ice to the triceps for 5 minutes and resumed activities with no reported c/o)  Shuttle squats 4x10   Matrix Bike for ROM x5 mins    therapeutic activities to improve functional performance for 30 minutes, including:  Decline squat from slant board 2x10   Lateral step ups to 6" step x20 each   Fwd step up to high knee x20 each   + Fwd marching, lateral stepping R and L, backward marching vs [employer] band x10 each  + Fwd walking x1 laps in clinic vs [employer] band  + Retro walking x2 laps in clinic vs [employer] band  + Plyobox taps (20") x10 each LE  Modified split squat with HHA PRN 2x5 each   Squats to plyobox 20" holding onto 7# wt 2x10   Hip hinge deadlift with 10# KB 2x10   Standing <> Tall kneeling t/f x5 + education regarding     Patient Education and Home Exercises     Home Exercises Provided and Patient Education Provided   Education provided:   - PT POC   - PT goals   - exercises/HEP    Written Home Exercises Provided: yes. Exercises were reviewed and Talya was able to demonstrate them prior to the end of the session.  Talya demonstrated good  understanding of the education provided. See EMR under Patient Instructions for exercises provided during therapy sessions    ASSESSMENT     Talya is a 58 y.o. female referred to outpatient Physical Therapy with a medical diagnosis of Contusion of right knee, subsequent encounter.     Pt to session noting marked improvements in condition with ongoing functional deficits " including stair negotiation and reports of occasional anterior R knee tightness as well as Right G/S tightness/stiffness reported. Progressed strengthening and functional activities within pt tolerance to address ongoing chief c/o anterior knee and calf (R) stiffness/tightness with negotiating stairs. Pt did exceptionally well with changes made today and put forth marked effort. Continue to progress functionally as tolerated with emphasis on functional strengthening activities.     Also important to note during heel raise activity pt reported left triceps cramping during activity which she related to having to scrub floors with more vigor today due to having to clean scuff marks, provided ice to the triceps for 5 minutes and resumed activities with no reported c/o.     Talya Is progressing well towards her goals.   Pt prognosis is Good.     Pt will continue to benefit from skilled outpatient physical therapy to address the deficits listed in the problem list box on initial evaluation, provide pt/family education and to maximize pt's level of independence in the home and community environment.     Pt's spiritual, cultural and educational needs considered and pt agreeable to plan of care and goals.     Anticipated barriers to physical therapy: work conditions    Goals:   Short Term Goals:  4 weeks  1.Report decreased R knee pain < / =  8/10  to increase tolerance for walking  2. Increase PROM R knee   3. Increased strength by 1/3 MMT grade in R knee to increase tolerance for ADL and work activities.  4. Pt to tolerate HEP to improve ROM and independence with ADL's     Long Term Goals: 8 weeks  1.Report decreased R knee pain  < / =  2 /10  to increase tolerance for WALKING  2.Increase AROM to WNL R KNEE  3.Increase strength to >/= 4/5 in R LE  to increase tolerance for ADL and work activities.  4. Pt goal: pain-free walking/stairs  5. Pt will have improved gcode of CJ (20-40% limited) on FOTO shoulder in order to  demonstrate true functional improvement.     PLAN     Con per POC.    Bimal Cristina, PT

## 2023-06-20 ENCOUNTER — CLINICAL SUPPORT (OUTPATIENT)
Dept: REHABILITATION | Facility: HOSPITAL | Age: 58
End: 2023-06-20
Payer: COMMERCIAL

## 2023-06-20 DIAGNOSIS — G89.29 CHRONIC PAIN OF RIGHT KNEE: Primary | ICD-10-CM

## 2023-06-20 DIAGNOSIS — M25.561 CHRONIC PAIN OF RIGHT KNEE: Primary | ICD-10-CM

## 2023-06-20 PROCEDURE — 97110 THERAPEUTIC EXERCISES: CPT

## 2023-06-20 PROCEDURE — 97530 THERAPEUTIC ACTIVITIES: CPT

## 2023-06-20 NOTE — PROGRESS NOTES
"[facility]   Physical Therapy Treatment Note     Name: Talya Bates  Clinic Number: [id number]    Therapy Diagnosis:   Encounter Diagnosis   Name Primary?    Chronic pain of right knee Yes     Physician: Barrington Prieto DO    Visit Date: 6/20/2023    Physician Orders: PT Eval and Treat   Medical Diagnosis from Referral: Contusion of right knee, subsequent encounter   Evaluation Date: 3/30/2023  Authorization Period Expiration: 03/27/2023 03/20/2024   Plan of Care Expiration: 6/30/23  Visit # / Visits authorized: 12/12, 8/12  PTA Visit #: 0/5   FOTO: 4/5    Time In: 2:00pm  Time Out: 3:00pm   Total Billable Time: 60 minutes (2TE, 2TA, 1MT)    SUBJECTIVE     Pt reports: Reports doing ok today with no pain reported about the knee. Also reports that stairs were not too bad today at work though this is still her primary. Feels due to stiffness primarily when ascending steps - across the anterior knee.   She was compliant with home exercise program.  Response to previous treatment: knee felt much better after her last visit   Functional change: improved ADLs and work tolerance    Pain: 0/10  Location: right knee anteriorly     OBJECTIVE     Objective Measures updated at progress report unless specified.     AROM//PROM  R knee 0-0132//0-0-134*p  MMT flexion/extension//5  R knee 4/4//5  Palpation: Pt with mild ttp to R knee joint line and proximal medial gastrocnemius head  No pain with OP extension, mild discomfort OP flexion, (-) Raeann, no catching/locking of the knee    Treatment     Talya received the treatments listed below:      manual therapy techniques: Joint mobilizations, Manual traction, Myofacial release, Soft tissue Mobilization, and Friction Massage were applied to the: right knee for 0 minutes, including:  Supine R knee flexion JM Gr IV 3x40  Supine R patellar glides in all directions Gr IV 3x25  Seated distraction with PROM 3x30"   R HS/G/S stretching 3x30"  R LE LAD " "4x60"    therapeutic exercises to develop strength, endurance, ROM, flexibility, posture, and core stabilization for 30 minutes including:  Scifit stepper x5 mins focusing on max ROM  Supine bridging 3x10   SL Clams BTB 2x10 each side  Fitter Soleus stretch 1x30" each  Fitter G/S stretch 3x30" Bebo  Shuttle squats 4x10   Matrix Bike for ROM x5 mins    therapeutic activities to improve functional performance for 30 minutes, including:  Decline squat from slant board 2x10   Lateral step ups to 6" step x20 each   Fwd step up to high knee x20 each   Fwd marching, lateral stepping R and L, backward marching vs [employer] band x10 each  Fwd walking x1 laps in clinic vs [employer] band  Retro walking x2 laps in clinic vs [employer] band  Plyobox taps (20") x10 each LE  Modified split squat with HHA PRN 2x5 each   Squats to plyobox 20" holding onto 7# wt 2x10   Hip hinge deadlift with 10# KB 2x10   Standing <> Tall kneeling t/f x5 + education regarding     Patient Education and Home Exercises     Home Exercises Provided and Patient Education Provided   Education provided:   - PT POC   - PT goals   - exercises/HEP    Written Home Exercises Provided: yes. Exercises were reviewed and Talya was able to demonstrate them prior to the end of the session.  Talya demonstrated good  understanding of the education provided. See EMR under Patient Instructions for exercises provided during therapy sessions    ASSESSMENT     Talya is a 58 y.o. female referred to outpatient Physical Therapy with a medical diagnosis of Contusion of right knee, subsequent encounter.     Pt to session noting no knee pain and marked improvements in condition with ongoing functional deficits including stair negotiation and reports of occasional anterior R knee tightness as well as Right G/S tightness/stiffness reported. Pt ultimately;y feels as though stair negotiation is improving slowly over time. Progressed strengthening and functional activities " within pt tolerance to address ongoing chief c/o anterior knee and calf (R) stiffness/tightness with negotiating stairs. Pt did exceptionally well with changes made today and put forth marked effort. Continue to progress functionally as tolerated with emphasis on functional strengthening activities.     Also important to note during heel raise activity pt reported left triceps cramping during activity which she related to having to scrub floors with more vigor today due to having to clean scuff marks, provided ice to the triceps for 5 minutes and resumed activities with no reported c/o.     Talya Is progressing well towards her goals.   Pt prognosis is Good.     Pt will continue to benefit from skilled outpatient physical therapy to address the deficits listed in the problem list box on initial evaluation, provide pt/family education and to maximize pt's level of independence in the home and community environment.     Pt's spiritual, cultural and educational needs considered and pt agreeable to plan of care and goals.     Anticipated barriers to physical therapy: work conditions    Goals:   Short Term Goals:  4 weeks  1.Report decreased R knee pain < / =  8/10  to increase tolerance for walking  2. Increase PROM R knee   3. Increased strength by 1/3 MMT grade in R knee to increase tolerance for ADL and work activities.  4. Pt to tolerate HEP to improve ROM and independence with ADL's     Long Term Goals: 8 weeks  1.Report decreased R knee pain  < / =  2 /10  to increase tolerance for WALKING  2.Increase AROM to WNL R KNEE  3.Increase strength to >/= 4/5 in R LE  to increase tolerance for ADL and work activities.  4. Pt goal: pain-free walking/stairs  5. Pt will have improved gcode of CJ (20-40% limited) on FOTO shoulder in order to demonstrate true functional improvement.     PLAN     Con per POC.    Bimal Cristina, PT

## 2023-06-23 ENCOUNTER — CLINICAL SUPPORT (OUTPATIENT)
Dept: REHABILITATION | Facility: HOSPITAL | Age: 58
End: 2023-06-23
Payer: COMMERCIAL

## 2023-06-23 DIAGNOSIS — M25.561 CHRONIC PAIN OF RIGHT KNEE: Primary | ICD-10-CM

## 2023-06-23 DIAGNOSIS — G89.29 CHRONIC PAIN OF RIGHT KNEE: Primary | ICD-10-CM

## 2023-06-23 PROCEDURE — 97530 THERAPEUTIC ACTIVITIES: CPT

## 2023-06-23 PROCEDURE — 97110 THERAPEUTIC EXERCISES: CPT

## 2023-06-23 NOTE — PROGRESS NOTES
"[facility]   Physical Therapy Treatment Note     Name: Talya Bates  Clinic Number: [id number]    Therapy Diagnosis:   Encounter Diagnosis   Name Primary?    Chronic pain of right knee Yes     Physician: Barrington Prieto DO    Visit Date: 6/23/2023    Physician Orders: PT Eval and Treat   Medical Diagnosis from Referral: Contusion of right knee, subsequent encounter   Evaluation Date: 3/30/2023  Authorization Period Expiration: 03/27/2023 03/20/2024   Plan of Care Expiration: 6/30/23  Visit # / Visits authorized: 12/12, 8/12  PTA Visit #: 0/5   FOTO: 4/5    Time In: 1:50pm  Time Out: 2:45pm   Total Billable Time: 55 minutes (2TE, 2TA)    SUBJECTIVE     Pt reports: Reports doing ok today with no pain reported about the knee. Also reports that stairs were not too bad today at work though this is still her primary. Feels due to stiffness primarily when ascending steps - across the anterior knee.   She was compliant with home exercise program.  Response to previous treatment: knee felt much better after her last visit   Functional change: improved ADLs and work tolerance    Pain: 0/10  Location: right knee anteriorly     OBJECTIVE     Objective Measures updated at progress report unless specified.     AROM//PROM  R knee 0-0132//0-0-134*p  MMT flexion/extension//5  R knee 4/4//5  Palpation: Pt with mild ttp to R knee joint line and proximal medial gastrocnemius head  No pain with OP extension, mild discomfort OP flexion, (-) Raeann, no catching/locking of the knee    Treatment     Talya received the treatments listed below:      manual therapy techniques: Joint mobilizations, Manual traction, Myofacial release, Soft tissue Mobilization, and Friction Massage were applied to the: right knee for 0 minutes, including:  Supine R knee flexion JM Gr IV 3x40  Supine R patellar glides in all directions Gr IV 3x25  Seated distraction with PROM 3x30"   R HS/G/S stretching 3x30"  R LE LAD " "4x60"    therapeutic exercises to develop strength, endurance, ROM, flexibility, posture, and core stabilization for 25 minutes including:  Scifit stepper x5 mins focusing on max ROM  Supine bridging 3x10   SL Clams BTB 2x10 each side  Fitter Soleus stretch 1x30" each  Fitter G/S stretch 3x30" Bebo  Shuttle squats 4x10   Matrix Bike for ROM x5 mins    therapeutic activities to improve functional performance for 30 minutes, including:  Decline squat from slant board 2x10   Lateral step ups to 6" step x20 each   Fwd step up to high knee x20 each   Fwd marching, lateral stepping R and L, backward marching vs [employer] band x10 each  Fwd walking x1 laps in clinic vs [employer] band  Retro walking x2 laps in clinic vs [employer] band  Plyobox taps (20") x10 each LE  Modified split squat with HHA PRN 2x5 each   Squats to plyobox 20" holding onto 7# wt 2x10   Hip hinge deadlift with 10# KB 2x10   Standing <> Tall kneeling t/f x5 + education regarding     Patient Education and Home Exercises     Home Exercises Provided and Patient Education Provided   Education provided:   - PT POC   - PT goals   - exercises/HEP    Written Home Exercises Provided: yes. Exercises were reviewed and Talya was able to demonstrate them prior to the end of the session.  Talya demonstrated good  understanding of the education provided. See EMR under Patient Instructions for exercises provided during therapy sessions    ASSESSMENT     Talya is a 58 y.o. female referred to outpatient Physical Therapy with a medical diagnosis of Contusion of right knee, subsequent encounter.     Pt to session noting no knee pain and marked improvements in condition with ongoing functional deficits including stair negotiation and reports of occasional anterior R knee tightness as well as Right G/S tightness/stiffness reported. Pt ultimately;y feels as though stair negotiation is improving slowly over time. Progressed strengthening and functional activities " within pt tolerance to address ongoing chief c/o anterior knee and calf (R) stiffness/tightness with negotiating stairs. Pt did exceptionally well with changes made today and put forth marked effort. Continue to progress functionally as tolerated with emphasis on functional strengthening activities.     Also important to note during heel raise activity pt reported left triceps cramping during activity which she related to having to scrub floors with more vigor today due to having to clean scuff marks, provided ice to the triceps for 5 minutes and resumed activities with no reported c/o.     Talya Is progressing well towards her goals.   Pt prognosis is Good.     Pt will continue to benefit from skilled outpatient physical therapy to address the deficits listed in the problem list box on initial evaluation, provide pt/family education and to maximize pt's level of independence in the home and community environment.     Pt's spiritual, cultural and educational needs considered and pt agreeable to plan of care and goals.     Anticipated barriers to physical therapy: work conditions    Goals:   Short Term Goals:  4 weeks  1.Report decreased R knee pain < / =  8/10  to increase tolerance for walking  2. Increase PROM R knee   3. Increased strength by 1/3 MMT grade in R knee to increase tolerance for ADL and work activities.  4. Pt to tolerate HEP to improve ROM and independence with ADL's     Long Term Goals: 8 weeks  1.Report decreased R knee pain  < / =  2 /10  to increase tolerance for WALKING  2.Increase AROM to WNL R KNEE  3.Increase strength to >/= 4/5 in R LE  to increase tolerance for ADL and work activities.  4. Pt goal: pain-free walking/stairs  5. Pt will have improved gcode of CJ (20-40% limited) on FOTO shoulder in order to demonstrate true functional improvement.     PLAN     Con per POC.    Bimal Cristina, PT

## 2023-06-26 ENCOUNTER — CLINICAL SUPPORT (OUTPATIENT)
Dept: REHABILITATION | Facility: HOSPITAL | Age: 58
End: 2023-06-26
Payer: COMMERCIAL

## 2023-06-26 DIAGNOSIS — M25.561 CHRONIC PAIN OF RIGHT KNEE: Primary | ICD-10-CM

## 2023-06-26 DIAGNOSIS — G89.29 CHRONIC PAIN OF RIGHT KNEE: Primary | ICD-10-CM

## 2023-06-26 PROCEDURE — 97530 THERAPEUTIC ACTIVITIES: CPT

## 2023-06-26 PROCEDURE — 97110 THERAPEUTIC EXERCISES: CPT

## 2023-06-26 NOTE — PROGRESS NOTES
"[facility]   Physical Therapy Treatment Note     Name: Talya Bates  Clinic Number: [id number]    Therapy Diagnosis:   Encounter Diagnosis   Name Primary?    Chronic pain of right knee Yes     Physician: Barrington Prieto DO    Visit Date: 6/26/2023    Physician Orders: PT Eval and Treat   Medical Diagnosis from Referral: Contusion of right knee, subsequent encounter   Evaluation Date: 3/30/2023  Authorization Period Expiration: 03/27/2023 03/20/2024   Plan of Care Expiration: 6/30/23  Visit # / Visits authorized: 12/12, 9/12  PTA Visit #: 0/5   FOTO: 5/5    Time In: 1:55pm  Time Out: 3:05pm   Total Billable Time: 70 minutes (2TE, 2TA)    SUBJECTIVE     Pt reports: Reports doing ok today with no pain upon entering the clinic. Was able to negotiate stairs today with NO pain, albeit very slowly. Feels due to stiffness primarily when ascending steps - across the anterior knee.   She was compliant with home exercise program.  Response to previous treatment: knee felt much better after her last visit   Functional change: improved ADLs and work tolerance    Pain: 0/10  Location: right knee anteriorly     OBJECTIVE     Objective Measures updated at progress report unless specified.     AROM//PROM  R knee 0-0132//0-0-134*p  MMT flexion/extension//5  R knee 4/4//5  Palpation: Pt with mild ttp to R knee joint line and proximal medial gastrocnemius head  No pain with OP extension, mild discomfort OP flexion, (-) Raeann, no catching/locking of the knee    Treatment     Talya received the treatments listed below:      manual therapy techniques: Joint mobilizations, Manual traction, Myofacial release, Soft tissue Mobilization, and Friction Massage were applied to the: right knee for 0 minutes, including:  Supine R knee flexion JM Gr IV 3x40  Supine R patellar glides in all directions Gr IV 3x25  Seated distraction with PROM 3x30"   R HS/G/S stretching 3x30"  R LE LAD 4x60"    therapeutic " "exercises to develop strength, endurance, ROM, flexibility, posture, and core stabilization for 25 minutes including:  Scifit stepper x5 mins focusing on max ROM  Seated LAQ 5# x30 each  Supine bridging 3x10   SL Clams BTB 2x10 each side  Fitter Soleus stretch 1x30" each  Fitter G/S stretch 3x30" Bebo  Shuttle squats 5x10   Matrix Bike for ROM x5 mins    therapeutic activities to improve functional performance for 45 minutes, including:  Decline squat from slant board 2x10   Lateral step ups to 6" step x20 each   Fwd step up to high knee x20 each   Fwd marching, lateral stepping R and L, backward marching vs Maroon Cook band x10 each  Fwd walking x1 laps in clinic vs Maroon Cook band  Retro walking x2 laps in clinic vs Maroon Cook band  Plyobox taps (20") x10 each LE  Modified split squat with HHA PRN 2x5 each   Squats to plyobox 20" holding onto 7# wt 2x10   Hip hinge deadlift with 10# KB 2x10   Standing <> Tall kneeling t/f x5 + education regarding     Patient Education and Home Exercises     Home Exercises Provided and Patient Education Provided   Education provided:   - PT POC   - PT goals   - exercises/HEP    Written Home Exercises Provided: yes. Exercises were reviewed and Talya was able to demonstrate them prior to the end of the session.  Talya demonstrated good  understanding of the education provided. See EMR under Patient Instructions for exercises provided during therapy sessions    ASSESSMENT     Talya is a 58 y.o. female referred to outpatient Physical Therapy with a medical diagnosis of Contusion of right knee, subsequent encounter.     Pt to session noting no knee pain and marked improvements in condition with ongoing functional deficits including stair negotiation and reports of occasional anterior R knee tightness as well as Right G/S tightness/stiffness reported. Pt ultimately feels as though stair negotiation is improving. Progressed strengthening and functional activities within pt tolerance " over the past 2 visits in order to address ongoing chief c/o anterior knee and calf (R) stiffness/tightness with negotiating stairs. Pt did exceptionally well with changes made today and put forth marked effort. Continue to progress functionally as tolerated with emphasis on functional strengthening activities.     Also important to note during heel raise activity pt reported left triceps cramping during activity which she related to having to scrub floors with more vigor today due to having to clean scuff marks, provided ice to the triceps for 5 minutes and resumed activities with no reported c/o.     Talya Is progressing well towards her goals.   Pt prognosis is Good.     Pt will continue to benefit from skilled outpatient physical therapy to address the deficits listed in the problem list box on initial evaluation, provide pt/family education and to maximize pt's level of independence in the home and community environment.     Pt's spiritual, cultural and educational needs considered and pt agreeable to plan of care and goals.     Anticipated barriers to physical therapy: work conditions    Goals:   Short Term Goals:  4 weeks  1.Report decreased R knee pain < / =  8/10  to increase tolerance for walking  2. Increase PROM R knee   3. Increased strength by 1/3 MMT grade in R knee to increase tolerance for ADL and work activities.  4. Pt to tolerate HEP to improve ROM and independence with ADL's     Long Term Goals: 8 weeks  1.Report decreased R knee pain  < / =  2 /10  to increase tolerance for WALKING  2.Increase AROM to WNL R KNEE  3.Increase strength to >/= 4/5 in R LE  to increase tolerance for ADL and work activities.  4. Pt goal: pain-free walking/stairs  5. Pt will have improved gcode of CJ (20-40% limited) on FOTO shoulder in order to demonstrate true functional improvement.     PLAN     Con per POC.    Bimal Cristina, PT

## 2023-07-07 ENCOUNTER — CLINICAL SUPPORT (OUTPATIENT)
Dept: REHABILITATION | Facility: HOSPITAL | Age: 58
End: 2023-07-07
Payer: COMMERCIAL

## 2023-07-07 DIAGNOSIS — G89.29 CHRONIC PAIN OF RIGHT KNEE: Primary | ICD-10-CM

## 2023-07-07 DIAGNOSIS — M25.561 CHRONIC PAIN OF RIGHT KNEE: Primary | ICD-10-CM

## 2023-07-07 PROCEDURE — 97530 THERAPEUTIC ACTIVITIES: CPT

## 2023-07-07 PROCEDURE — 97110 THERAPEUTIC EXERCISES: CPT

## 2023-07-07 NOTE — PROGRESS NOTES
"[facility]   Physical Therapy Treatment Note     Name: Talya Bates  Clinic Number: [id number]    Therapy Diagnosis:   Encounter Diagnosis   Name Primary?    Chronic pain of right knee Yes     Physician: Barrington Prieto DO    Visit Date: 7/7/2023    Physician Orders: PT Eval and Treat   Medical Diagnosis from Referral: Contusion of right knee, subsequent encounter   Evaluation Date: 3/30/2023  Authorization Period Expiration: 03/27/2023 03/20/2024   Plan of Care Expiration: 6/30/23  Visit # / Visits authorized: 12/12, 10/12  PTA Visit #: 0/5   FOTO: 5/5    Time In: 1:55pm  Time Out: 2:55pm   Total Billable Time: 60 minutes (2TE, 2TA)    SUBJECTIVE     Pt reports: Reports doing ok today with no pain upon entering the clinic again today, though does report a bit more stiffness about the G/S complex today.    She was compliant with home exercise program.  Response to previous treatment: doing much better overall and feeling as though she is getting close to DC  Functional change: improved ADLs and work tolerance    Pain: 0/10  Location: right knee anteriorly     OBJECTIVE     Objective Measures updated at progress report unless specified.     AROM//PROM  R knee 0-0132//0-0-134*p  MMT flexion/extension//5  R knee 4/4//5  Palpation: Pt with mild ttp to R knee joint line and proximal medial gastrocnemius head  No pain with OP extension, mild discomfort OP flexion, (-) Raeann, no catching/locking of the knee    Treatment     Talya received the treatments listed below:      manual therapy techniques: Joint mobilizations, Manual traction, Myofacial release, Soft tissue Mobilization, and Friction Massage were applied to the: right knee for 0 minutes, including:  Supine R knee flexion JM Gr IV 3x40  Supine R patellar glides in all directions Gr IV 3x25  Seated distraction with PROM 3x30"   R HS/G/S stretching 3x30"  R LE LAD 4x60"    therapeutic exercises to develop strength, endurance, " "ROM, flexibility, posture, and core stabilization for 25 minutes including:  Scifit stepper x5 mins focusing on max ROM  Seated LAQ 5# x30 each  Supine bridging 3x10   SL Clams BTB 2x10 each side  Fitter Soleus stretch 1x30" each  Fitter G/S stretch 3x30" Bebo  Shuttle squats 5x10   Matrix Bike for ROM x5 mins    therapeutic activities to improve functional performance for 30 minutes, including:  Decline squat from slant board 2x10   Lateral step ups to 6" step x20 each   Fwd step up to high knee x20 each   Fwd marching, lateral stepping R and L, backward marching vs Maroon Cook band x10 each  Fwd walking x1 laps in clinic vs Maroon Cook band  Retro walking x2 laps in clinic vs Maroon Cook band  Plyobox taps (20") x10 each LE  Modified split squat with HHA PRN 2x5 each   Squats to plyobox 20" holding onto 7# wt 2x10   Hip hinge deadlift with 10# KB 2x10   Standing <> Tall kneeling t/f x5 + education regarding     Patient Education and Home Exercises     Home Exercises Provided and Patient Education Provided   Education provided:   - PT POC   - PT goals   - exercises/HEP    Written Home Exercises Provided: yes. Exercises were reviewed and Talya was able to demonstrate them prior to the end of the session.  Talya demonstrated good  understanding of the education provided. See EMR under Patient Instructions for exercises provided during therapy sessions    ASSESSMENT     Talya is a 58 y.o. female referred to outpatient Physical Therapy with a medical diagnosis of Contusion of right knee, subsequent encounter.     Pt to session noting no knee pain and marked improvements in condition with ongoing functional deficits including stair negotiation and reports of occasional anterior R knee tightness as well as Right G/S tightness/stiffness reported. Pt ultimately feels as though stair negotiation is improving, though still with occasional issues due to reported stiffness. This is being managed by HEP. Pt has 1 additionally " scheduled follow up PT visit at this time and planning to DC next week due to s/s being managed with current program. Also important to note the pt does not have an Occ Med follow up apt scheduled at this time and will assist with her scheduling this follow up.     Talya Is progressing well towards her goals.   Pt prognosis is Good.     Pt will continue to benefit from skilled outpatient physical therapy to address the deficits listed in the problem list box on initial evaluation, provide pt/family education and to maximize pt's level of independence in the home and community environment.     Pt's spiritual, cultural and educational needs considered and pt agreeable to plan of care and goals.     Anticipated barriers to physical therapy: work conditions    Goals:   Short Term Goals:  4 weeks  1.Report decreased R knee pain < / =  8/10  to increase tolerance for walking  2. Increase PROM R knee   3. Increased strength by 1/3 MMT grade in R knee to increase tolerance for ADL and work activities.  4. Pt to tolerate HEP to improve ROM and independence with ADL's     Long Term Goals: 8 weeks  1.Report decreased R knee pain  < / =  2 /10  to increase tolerance for WALKING  2.Increase AROM to WNL R KNEE  3.Increase strength to >/= 4/5 in R LE  to increase tolerance for ADL and work activities.  4. Pt goal: pain-free walking/stairs  5. Pt will have improved gcode of CJ (20-40% limited) on FOTO shoulder in order to demonstrate true functional improvement.     PLAN     Con per POC.    Bimal Cristina, PT

## 2023-07-10 ENCOUNTER — CLINICAL SUPPORT (OUTPATIENT)
Dept: REHABILITATION | Facility: HOSPITAL | Age: 58
End: 2023-07-10
Payer: COMMERCIAL

## 2023-07-10 DIAGNOSIS — M25.561 CHRONIC PAIN OF RIGHT KNEE: Primary | ICD-10-CM

## 2023-07-10 DIAGNOSIS — G89.29 CHRONIC PAIN OF RIGHT KNEE: Primary | ICD-10-CM

## 2023-07-10 PROCEDURE — 97530 THERAPEUTIC ACTIVITIES: CPT

## 2023-07-10 PROCEDURE — 97110 THERAPEUTIC EXERCISES: CPT

## 2023-07-10 NOTE — PROGRESS NOTES
"[facility]   Physical Therapy Treatment Note   Discharge Note    Name: Talya Bates  Clinic Number: [id number]    Therapy Diagnosis:   Encounter Diagnosis   Name Primary?    Chronic pain of right knee Yes     Physician: Barrington Prieto DO    Visit Date: 7/10/2023    Physician Orders: PT Eval and Treat   Medical Diagnosis from Referral: Contusion of right knee, subsequent encounter   Evaluation Date: 3/30/2023  Authorization Period Expiration: 03/27/2023 03/20/2024   Plan of Care Expiration: 6/30/23  Visit # / Visits authorized: 12/12, 11/12  PTA Visit #: 0/5   FOTO: 5/5    Time In: 2:00pm  Time Out: 3:00pm   Total Billable Time: 60 minutes (2TE, 2TA)    SUBJECTIVE     Pt reports: Reports doing ok overall and pleased with her progress with therapy. Currently pt to clinic with no c/o knee or calf pain. Also notes much improvements in ability to negotiate stairs and complete work tasks; however, still with occasional knee stiffness/tightness with stair negotiation both ascending and descending.     She was compliant with home exercise program.  Response to previous treatment: no adverse response, see above  Functional change: improved ADLs and work tolerance    Pain: 0/10  Location: right knee anteriorly     OBJECTIVE     Objective Measures updated at progress report unless specified.     AROM//PROM  - R knee: WNL  MMT flexion/extension//5  - R knee 4+/4+//5    Treatment     Talya received the treatments listed below:      therapeutic exercises to develop strength, endurance, ROM, flexibility, posture, and core stabilization for 25 minutes including:  Scifit stepper x5 mins focusing on max ROM  Seated LAQ 5# x30 each  Supine bridging 3x10   SL Clams BTB 2x10 each side  Fitter Soleus stretch 1x30" each  Fitter G/S stretch 3x30" Bebo  Shuttle squats 5x10   Matrix Bike for ROM x5 mins    therapeutic activities to improve functional performance for 30 minutes, including:  Decline squat " "from slant board 2x10   Lateral step ups to 6" step x20 each   Fwd step up to high knee x20 each   Fwd marching, lateral stepping R and L, backward marching vs Maroon Cook band x10 each  Fwd walking x1 laps in clinic vs Maroon Cook band  Retro walking x2 laps in clinic vs Maroon Cook band  Plyobox taps (20") x10 each LE  Modified split squat with HHA PRN 2x5 each   Squats to plyobox 20" holding onto 7# wt 2x10   Hip hinge deadlift with 10# KB 2x10   Standing <> Tall kneeling t/f x5 + education regarding     Patient Education and Home Exercises     Home Exercises Provided and Patient Education Provided   Education provided:   - HEP for maintenance purposes    Written Home Exercises Provided: yes. Exercises were reviewed and Talya was able to demonstrate them prior to the end of the session.  Talya demonstrated good  understanding of the education provided. See EMR under Patient Instructions for exercises provided during therapy sessions    ASSESSMENT     Talya is a 58 y.o. female referred to outpatient Physical Therapy with a medical diagnosis of Contusion of right knee, subsequent encounter.     Pt to session noting no knee pain or calf pain upon entering the clinic today. Overall, she notes being pleased with her overall rehabilitation progress. 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 At last visit, plan was to DC pt pending no set backs and this is being followed through with today. Pt notes ready for DC from skilled PT intervention and is currently I with HEP. Pt does not have an [facility] Med appointment follow up scheduled at this time; however, pt will call the clinic to follow up.     Talya Is progressing well towards her goals.   Pt prognosis is Good.     Pt's spiritual, cultural and educational " needs considered and pt agreeable to plan of care and goals.     Anticipated barriers to physical therapy: work conditions    Goals:   Short Term Goals:  4 weeks  1.Report decreased R knee pain < / =  8/10  to increase tolerance for walking MET  2. Increase PROM R knee MET  3. Increased strength by 1/3 MMT grade in R knee to increase tolerance for ADL and work activities.MET  4. Pt to tolerate HEP to improve ROM and independence with ADL's MET     Long Term Goals: 8 weeks  1.Report decreased R knee pain  < / =  2 /10  to increase tolerance for WALKING MET  2.Increase AROM to WNL R KNEE MET  3.Increase strength to >/= 4/5 in R LE  to increase tolerance for ADL and work activities. MET  4. Pt goal: pain-free walking/stairs Mostly MET  5. Pt will have improved gcode of CJ (20-40% limited) on FOTO shoulder in order to demonstrate true functional improvement.     PLAN     Pt Dc'd to I with HEP at this time.     Bimal Cristina, PT

## 2023-07-17 ENCOUNTER — OFFICE VISIT (OUTPATIENT)
Dept: URGENT CARE | Facility: CLINIC | Age: 58
End: 2023-07-17
Payer: COMMERCIAL

## 2023-07-17 VITALS
OXYGEN SATURATION: 98 % | HEIGHT: 67 IN | RESPIRATION RATE: 16 BRPM | DIASTOLIC BLOOD PRESSURE: 92 MMHG | HEART RATE: 69 BPM | WEIGHT: 205 LBS | BODY MASS INDEX: 32.18 KG/M2 | SYSTOLIC BLOOD PRESSURE: 143 MMHG

## 2023-07-17 DIAGNOSIS — Z02.6 ENCOUNTER RELATED TO WORKER'S COMPENSATION CLAIM: ICD-10-CM

## 2023-07-17 DIAGNOSIS — M25.661 JOINT STIFFNESS OF KNEE, RIGHT: Primary | ICD-10-CM

## 2023-07-17 DIAGNOSIS — S86.811D STRAIN OF CALF MUSCLE, RIGHT, SUBSEQUENT ENCOUNTER: ICD-10-CM

## 2023-07-17 DIAGNOSIS — M25.461 PAIN AND SWELLING OF RIGHT KNEE: ICD-10-CM

## 2023-07-17 DIAGNOSIS — S80.01XD CONTUSION OF RIGHT KNEE, SUBSEQUENT ENCOUNTER: ICD-10-CM

## 2023-07-17 DIAGNOSIS — M25.561 PAIN AND SWELLING OF RIGHT KNEE: ICD-10-CM

## 2023-07-17 PROCEDURE — 73562 XR KNEE 3 VIEW RIGHT: ICD-10-PCS | Mod: RT,S$GLB,, | Performed by: RADIOLOGY

## 2023-07-17 PROCEDURE — 99215 OFFICE O/P EST HI 40 MIN: CPT | Mod: S$GLB,,, | Performed by: STUDENT IN AN ORGANIZED HEALTH CARE EDUCATION/TRAINING PROGRAM

## 2023-07-17 PROCEDURE — 73562 X-RAY EXAM OF KNEE 3: CPT | Mod: RT,S$GLB,, | Performed by: RADIOLOGY

## 2023-07-17 PROCEDURE — 99215 PR OFFICE/OUTPT VISIT, EST, LEVL V, 40-54 MIN: ICD-10-PCS | Mod: S$GLB,,, | Performed by: STUDENT IN AN ORGANIZED HEALTH CARE EDUCATION/TRAINING PROGRAM

## 2023-07-17 RX ORDER — DICLOFENAC SODIUM 10 MG/G
2 GEL TOPICAL 4 TIMES DAILY
Qty: 150 G | Refills: 2 | Status: SHIPPED | OUTPATIENT
Start: 2023-07-17

## 2023-07-17 NOTE — PROGRESS NOTES
Subjective:      Patient ID: Talya Bates is a 58 y.o. female.    Chief Complaint: Knee Injury    Patient's place of employment - [employer]  Patient's job title -   Date of Injury - 03/10/2023  Body part injured - Right Knee  Current work status per last visit - Restrictions   Improved, same, or worse - Improved   Pain Scale right now (1-10) - 0/10       See MA note above. Begin MD note:  Patient states her knee is still stiff and swollen and the tightness is unchanged. These symptoms are constant and no aggravating or alleviating factors. She denies popping, locking, catching, or giving out of the knee. She completed PT and was discharged with her HEP which she tries to do. She uses the voltaren gel which does provide some relief and she is in need of a refill. She says her employer has never been able to accommodate her work restrictions despite discussing them and this is a source of frustration for her. She mentioned her back pain to her therapist and was shown some exercises to do to help with that and she says it has improved. No other new symptoms or complaints.     Constitution: Positive for activity change and generalized weakness.   Musculoskeletal:  Positive for pain, abnormal ROM of joint and back pain. Negative for muscle cramps.   Skin:  Negative for color change and bruising.   Neurological:  Negative for loss of balance and tingling.   Psychiatric/Behavioral:  Positive for sleep disturbance.    Objective:     Physical Exam  Vitals and nursing note reviewed.   Constitutional:       General: She is not in acute distress.     Appearance: She is not ill-appearing.   HENT:      Head: Normocephalic.   Eyes:      Conjunctiva/sclera: Conjunctivae normal.   Pulmonary:      Effort: No respiratory distress.   Musculoskeletal:      Right knee: Crepitus present. Tenderness present over the patellar tendon. No medial joint line, lateral joint line, MCL or LCL tenderness.      Instability  Tests: Anterior Lachman test negative. Medial Raeann test negative and lateral Raeann test negative.      Comments: Mild swelling of the right knee not significantly different than the left visually. There is significant popping and crepitus with Raeann testing of the knee although patient denies increased pain with these.    Skin:     General: Skin is warm and dry.   Neurological:      Mental Status: She is alert and oriented to person, place, and time.   Psychiatric:         Attention and Perception: Attention normal.         Mood and Affect: Mood normal.         Behavior: Behavior normal.      Assessment:      1. Joint stiffness of knee, right    2. Encounter related to worker's compensation claim    3. Contusion of right knee, subsequent encounter    4. Pain and swelling of right knee    5. Strain of calf muscle, right, subsequent encounter      EXAMINATION:  XR KNEE 3 VIEW RIGHT     TECHNIQUE:  Three views of the right knee were obtained.     COMPARISON:  Comparison is made to 03/10/2023.  Clinical information of pain, with no recent trauma specified.     FINDINGS:  Visualized osseous structures appear unremarkable, with no evidence of recent or healing fracture or lytic destructive process observed.  No interval tibiofemoral or patellofemoral joint space narrowing.  No significant volume of joint effusion.  No significant detrimental interval change since 03/10/2023 is appreciated.     Impression:     As above        Electronically signed by: Art Bowling MD  Date:                                            07/17/2023  Time:                                           11:08    Plan:     No acute findings on xray imaging from previous. Ordered MRI also to evaluate for acute pathology contributing to persistent anterior knee pain, stiffness, and swelling despite 2 rounds of PT. Plan to refer to ortho post imaging if indicated based on results. Slight modification to work restrictions despite patient reporting  working at full duty given lack of employer accomodation. Refilled voltaren gel. F/u sooner than 2 weeks if needed. Patient in agreement with the treatment plan.     Medications Ordered This Encounter   Medications    diclofenac sodium (VOLTAREN) 1 % Gel     Sig: Apply 2 g topically 4 (four) times daily.     Dispense:  150 g     Refill:  2     Patient Instructions: Attention not to aggravate affected area, MRI to be scheduled once authorized   Restrictions: No lifting/pushing/pulling more than 50 lbs, Avoid climbing/kneeling/squatting  Follow up in about 2 weeks (around 7/31/2023).    I spent a total of 40 minutes on the day of the visit. This includes face to face time and non-face to face time preparing to see the patient (eg, review of tests, prior records/notes), obtaining and/or reviewing separately obtained history, documenting clinical information in the electronic or other health record, independently interpreting results and communicating results to the patient.

## 2023-07-17 NOTE — LETTER
[facility] - Occupational Health  [address] [address]  Phone: [phone]  Fax: [phone]  [facility]: [email or web address]    Pt Name: Talya Bates  Injury Date: 03/10/2023   Employee ID: [id number] Date of Treatment: 07/17/2023   Company: [employer]      Appointment Time:  Arrived: 9:25 AM    Provider: Erika Winslow MD Time Out:11:13 AM     Office Treatment:   1. Joint stiffness of knee, right    2. Encounter related to worker's compensation claim    3. Contusion of right knee, subsequent encounter    4. Pain and swelling of right knee    5. Strain of calf muscle, right, subsequent encounter      Medications Ordered This Encounter   Medications    diclofenac sodium (VOLTAREN) 1 % Gel                 Return Appointment: 7/24/2023 at 9:45 AM [facility]

## 2023-07-21 ENCOUNTER — TELEPHONE (OUTPATIENT)
Dept: URGENT CARE | Facility: CLINIC | Age: 58
End: 2023-07-21
Payer: COMMERCIAL

## 2023-07-24 ENCOUNTER — OCCUPATIONAL HEALTH (OUTPATIENT)
Dept: URGENT CARE | Facility: CLINIC | Age: 58
End: 2023-07-24

## 2023-07-24 ENCOUNTER — OFFICE VISIT (OUTPATIENT)
Dept: URGENT CARE | Facility: CLINIC | Age: 58
End: 2023-07-24
Payer: COMMERCIAL

## 2023-07-24 VITALS
HEIGHT: 67 IN | SYSTOLIC BLOOD PRESSURE: 125 MMHG | SYSTOLIC BLOOD PRESSURE: 125 MMHG | OXYGEN SATURATION: 98 % | BODY MASS INDEX: 32.18 KG/M2 | HEART RATE: 68 BPM | WEIGHT: 205 LBS | WEIGHT: 205 LBS | TEMPERATURE: 98 F | DIASTOLIC BLOOD PRESSURE: 77 MMHG | HEIGHT: 67 IN | DIASTOLIC BLOOD PRESSURE: 77 MMHG | TEMPERATURE: 98 F | BODY MASS INDEX: 32.18 KG/M2 | HEART RATE: 68 BPM

## 2023-07-24 DIAGNOSIS — S80.01XD CONTUSION OF RIGHT KNEE, SUBSEQUENT ENCOUNTER: Primary | ICD-10-CM

## 2023-07-24 DIAGNOSIS — Z02.6 ENCOUNTER RELATED TO WORKER'S COMPENSATION CLAIM: ICD-10-CM

## 2023-07-24 DIAGNOSIS — H11.32 SUBCONJUNCTIVAL HEMORRHAGE OF LEFT EYE: Primary | ICD-10-CM

## 2023-07-24 DIAGNOSIS — Z02.83 ENCOUNTER FOR DRUG SCREENING: Primary | ICD-10-CM

## 2023-07-24 PROCEDURE — 99213 PR OFFICE/OUTPT VISIT, EST, LEVL III, 20-29 MIN: ICD-10-PCS | Mod: S$GLB,,,

## 2023-07-24 PROCEDURE — 99213 OFFICE O/P EST LOW 20 MIN: CPT | Mod: S$GLB,,,

## 2023-07-24 PROCEDURE — 80305 DRUG TEST PRSMV DIR OPT OBS: CPT | Mod: S$GLB,,, | Performed by: NURSE PRACTITIONER

## 2023-07-24 PROCEDURE — 80305 OOH COLLECTION ONLY DRUG SCREEN: ICD-10-PCS | Mod: S$GLB,,, | Performed by: NURSE PRACTITIONER

## 2023-07-24 RX ORDER — TOBRAMYCIN AND DEXAMETHASONE 3; 1 MG/ML; MG/ML
1 SUSPENSION/ DROPS OPHTHALMIC EVERY 6 HOURS
Qty: 10 ML | Refills: 0 | Status: SHIPPED | OUTPATIENT
Start: 2023-07-24 | End: 2023-07-29

## 2023-07-24 NOTE — LETTER
[facility] Occupational Health  [address] [address]  Phone: [phone]  Fax: [phone]  [facility]: [email or web address]    Pt Name: Talya Bates  Injury Date: 03/10/2023   Employee ID: [id number] Date of Treatment: 07/24/2023   Company: [employer]      Appointment Time: 09:15 AM Arrived: 9:15   Provider: Barrington Prieto,  Time Out:10:54     Office Treatment:   1. Contusion of right knee, subsequent encounter               No Restrictions: Regular Duty, Discharged from Occupational Health

## 2023-07-24 NOTE — LETTER
[facility] Occupational Health  [address] [address]  Phone: [phone]  Fax: [phone]  [facility]: [email or web address]    Pt Name: Talya Bates  Injury Date: 07/24/2023   Employee ID: [id number] Date of First Treatment: 07/24/2023   Company: [employer]      Appointment Time: 09:00 AM Arrived: 9:15   Provider: Barrington Prieto DO Time Out:10:53     Office Treatment:   1. Subconjunctival hemorrhage of left eye    2. Encounter related to worker's compensation claim      Medications Ordered This Encounter   Medications    tobramycin-dexAMETHasone 0.3-0.1% (TOBRADEX) 0.3-0.1 % DrpS           Restrictions: Regular Duty     Return Appointment: 7/24/2023 at 2:30

## 2023-07-24 NOTE — PROGRESS NOTES
Subjective:      Patient ID: Talya Bates is a 58 y.o. female.    Chief Complaint: Eye Injury (Left)    See MA note.  A co-worker noticed some left eye redness earlier this morning.  Just prior to this observation, she was in the process of trying to swat away a flying bug.  She is unsure whether the bug fluent to her left eye.  She denies any direct trauma to her eye from her hand or finger.  She feels that her eyes irritated but not painful.    Patient's place of employment - [employer]  Patient's job title -   Date of Injury - 7/247/23  Body part injured including left or right - Left Eye  Injury Mechanism -    What they were doing when they got hurt - She was in the truck where there was a bug in the truck and she thinks it got into her eye. She then went into the building where someone tolder that her eye was turning red   What they did immediately after - Reported it  Pain scale right now - 0/10 just irritated     Eye Exam:  Far- Both 20/30  Right 20/30  Left 20/40   Near- Both 20/30  Right 20/50  Left 20/50 ( she ears glasses but didn't have them on when the injury happened    EC    Eye Injury   Associated symptoms include an eye discharge, eye redness and itching. Pertinent negatives include no blurred vision or double vision.     Constitution: Positive for activity change. Negative for generalized weakness.   HENT:  Positive for facial swelling.    Eyes:  Positive for eye discharge, eye itching, eye redness and eyelid swelling. Negative for eye pain, vision loss, double vision and blurred vision.   Objective:     Physical Exam  Vitals and nursing note reviewed.   Constitutional:       General: She is not in acute distress.  HENT:      Head: Normocephalic.   Eyes:      General: Lids are normal.         Left eye: No foreign body, discharge or hordeolum.      Extraocular Movements: Extraocular movements intact.      Left eye: Normal extraocular motion and no nystagmus.       Conjunctiva/sclera:      Left eye: Left conjunctiva is injected. No exudate or hemorrhage.     Comments: OS medial side subconjunctival hemorrhage with some mild inflammation of the mucosal lining.  No foreign body found on examination to conjunctiva or when upper and lower lids are inverted.  No corneal abrasion noted upon application of fluorescein dye.   Musculoskeletal:      Cervical back: No pain with movement.      Right knee: Crepitus present. No swelling, deformity, effusion, erythema, ecchymosis or bony tenderness. Normal range of motion. No tenderness. No LCL laxity, MCL laxity or ACL laxity. Normal alignment, normal meniscus and normal patellar mobility.      Instability Tests: Anterior drawer test negative. Medial Raeann test negative and lateral Raeann test negative.      Right lower leg: No swelling or deformity.      Comments: No gross deformity noted to the right knee.  No overlying skin changes such as swelling, bruising, or erythema.  She is full painless range of motion of her knee albeit with some crepitus during passive movement.  No ligamentous instability.  Negative anterior drawer and Raeann evaluation.  She is able to climb on and off the examination table without difficulty or assistance and normal gait.   Skin:     General: Skin is warm.      Capillary Refill: Capillary refill takes less than 2 seconds.   Neurological:      General: No focal deficit present.      Mental Status: She is alert and oriented to person, place, and time.      Deep Tendon Reflexes: Reflexes are normal and symmetric.   Psychiatric:         Attention and Perception: Attention normal.         Mood and Affect: Mood and affect normal.         Speech: Speech normal.         Behavior: Behavior normal. Behavior is cooperative.     Assessment:      1. Subconjunctival hemorrhage of left eye    2. Encounter related to worker's compensation claim      Plan:   Fortunately, there is no foreign body or corneal abrasion on  examination.  However, she does have some conjunctival irritation.  Therefore I have prescribed some ophthalmic drops to see if this her symptoms.  She may remain at full duty status but return to clinic in 2 days for re-evaluation.    I spent a total of 30 minutes on the day of the visit.This includes face to face time and non-face to face time preparing to see the patient (eg, review of tests), obtaining and/or reviewing separately obtained history, documenting clinical information in the electronic or other health record, independently interpreting results and communicating results to the patient/family/caregiver, or care coordinator.     Medications Ordered This Encounter   Medications    tobramycin-dexAMETHasone 0.3-0.1% (TOBRADEX) 0.3-0.1 % DrpS     Sig: Place 1 drop into the left eye every 6 (six) hours. for 5 days     Dispense:  10 mL     Refill:  0         Restrictions: Regular Duty  Follow up in about 2 days (around 7/26/2023).

## 2023-07-27 ENCOUNTER — TELEPHONE (OUTPATIENT)
Dept: RHEUMATOLOGY | Facility: CLINIC | Age: 58
End: 2023-07-27
Payer: COMMERCIAL

## 2023-08-04 ENCOUNTER — OFFICE VISIT (OUTPATIENT)
Dept: OBSTETRICS AND GYNECOLOGY | Facility: CLINIC | Age: 58
End: 2023-08-04
Payer: COMMERCIAL

## 2023-08-04 VITALS
DIASTOLIC BLOOD PRESSURE: 74 MMHG | SYSTOLIC BLOOD PRESSURE: 110 MMHG | WEIGHT: 209 LBS | HEART RATE: 75 BPM | BODY MASS INDEX: 32.73 KG/M2

## 2023-08-04 DIAGNOSIS — R30.0 DYSURIA: ICD-10-CM

## 2023-08-04 DIAGNOSIS — Z12.31 VISIT FOR SCREENING MAMMOGRAM: ICD-10-CM

## 2023-08-04 DIAGNOSIS — Z01.419 ROUTINE GYNECOLOGICAL EXAMINATION: Primary | ICD-10-CM

## 2023-08-04 DIAGNOSIS — Z12.4 CERVICAL CANCER SCREENING: ICD-10-CM

## 2023-08-04 PROCEDURE — 1159F MED LIST DOCD IN RCRD: CPT | Mod: CPTII,S$GLB,, | Performed by: OBSTETRICS & GYNECOLOGY

## 2023-08-04 PROCEDURE — 88141 PR  CYTOPATH CERV/VAG INTERPRET: ICD-10-PCS | Mod: ,,, | Performed by: PATHOLOGY

## 2023-08-04 PROCEDURE — 88141 CYTOPATH C/V INTERPRET: CPT | Mod: ,,, | Performed by: PATHOLOGY

## 2023-08-04 PROCEDURE — 99396 PREV VISIT EST AGE 40-64: CPT | Mod: S$GLB,,, | Performed by: OBSTETRICS & GYNECOLOGY

## 2023-08-04 PROCEDURE — 3008F BODY MASS INDEX DOCD: CPT | Mod: CPTII,S$GLB,, | Performed by: OBSTETRICS & GYNECOLOGY

## 2023-08-04 PROCEDURE — 99999 PR PBB SHADOW E&M-EST. PATIENT-LVL IV: CPT | Mod: PBBFAC,,, | Performed by: OBSTETRICS & GYNECOLOGY

## 2023-08-04 PROCEDURE — 3008F PR BODY MASS INDEX (BMI) DOCUMENTED: ICD-10-PCS | Mod: CPTII,S$GLB,, | Performed by: OBSTETRICS & GYNECOLOGY

## 2023-08-04 PROCEDURE — 3074F PR MOST RECENT SYSTOLIC BLOOD PRESSURE < 130 MM HG: ICD-10-PCS | Mod: CPTII,S$GLB,, | Performed by: OBSTETRICS & GYNECOLOGY

## 2023-08-04 PROCEDURE — 87624 HPV HI-RISK TYP POOLED RSLT: CPT | Performed by: OBSTETRICS & GYNECOLOGY

## 2023-08-04 PROCEDURE — 3078F PR MOST RECENT DIASTOLIC BLOOD PRESSURE < 80 MM HG: ICD-10-PCS | Mod: CPTII,S$GLB,, | Performed by: OBSTETRICS & GYNECOLOGY

## 2023-08-04 PROCEDURE — 99999 PR PBB SHADOW E&M-EST. PATIENT-LVL IV: ICD-10-PCS | Mod: PBBFAC,,, | Performed by: OBSTETRICS & GYNECOLOGY

## 2023-08-04 PROCEDURE — 3074F SYST BP LT 130 MM HG: CPT | Mod: CPTII,S$GLB,, | Performed by: OBSTETRICS & GYNECOLOGY

## 2023-08-04 PROCEDURE — 1159F PR MEDICATION LIST DOCUMENTED IN MEDICAL RECORD: ICD-10-PCS | Mod: CPTII,S$GLB,, | Performed by: OBSTETRICS & GYNECOLOGY

## 2023-08-04 PROCEDURE — 99396 PR PREVENTIVE VISIT,EST,40-64: ICD-10-PCS | Mod: S$GLB,,, | Performed by: OBSTETRICS & GYNECOLOGY

## 2023-08-04 PROCEDURE — 88175 CYTOPATH C/V AUTO FLUID REDO: CPT | Performed by: PATHOLOGY

## 2023-08-04 PROCEDURE — 3078F DIAST BP <80 MM HG: CPT | Mod: CPTII,S$GLB,, | Performed by: OBSTETRICS & GYNECOLOGY

## 2023-08-04 RX ORDER — NITROFURANTOIN 25; 75 MG/1; MG/1
100 CAPSULE ORAL 2 TIMES DAILY
Qty: 14 CAPSULE | Refills: 0 | Status: SHIPPED | OUTPATIENT
Start: 2023-08-04 | End: 2023-08-11

## 2023-08-04 NOTE — PROGRESS NOTES
57 yo postmenopausal female who presents for routine gyn visit.  Reports no cycles since 2010.   No std testing needed.  Having hot flashes.    OTC lubricants aren't improving this problem.  Patient reports h/o DVT about 5 yrs ago.  No h/o abl mammograms. Per patient, mammograms were completed at [facility].  Patient to be placed on kidney transplant list.  And now s/p kidney!!!  Wants pap smear today.    ROS:  GENERAL: Denies weight gain or weight loss. Feeling well overall.   SKIN: Denies rash or lesions.   HEAD: Denies head injury or headache.   CHEST: Denies chest pain or shortness of breath.   CARDIOVASCULAR: Denies palpitations or left sided chest pain.   ABDOMEN: No abdominal pain, constipation, diarrhea, nausea, vomiting or rectal bleeding.   URINARY: No frequency, dysuria, hematuria, or burning on urination.   REPRODUCTIVE: vaginal dryness.   BREASTS: denies pain, lumps, or nipple discharge.   HEMATOLOGIC: No easy bruisability or excessive bleeding.   MUSCULOSKELETAL: Denies joint pain or swelling.   NEUROLOGIC: Denies syncope or weakness.   PSYCHIATRIC: Denies depression, anxiety or mood swings.       PE:   Vitals: /74   Pulse 75   Wt 94.8 kg (208 lb 15.9 oz)   LMP 05/17/2010   BMI 32.73 kg/m²   APPEARANCE: Well nourished, well developed, in no acute distress.  ABDOMEN: Soft. No tenderness or masses. No hepatosplenomegaly. No hernias.  BREASTS: Symmetrical, no skin changes or visible lesions. No palpable masses, nipple discharge or adenopathy bilaterally.  PELVIC: Normal external female genitalia without lesions. Normal hair distribution. Adequate perineal body, normal urethral meatus. Atrophic vagina without lesions or discharge. Cervix pink and without lesions. No significant cystocele or rectocele. Bimanual exam showed uterus normal size, shape, position, mobile and nontender. Adnexa without masses or tenderness. Urethra and bladder normal.  EXTREMITIES: No clubbing cyanosis or  edema.      AP  Routine gyn  -s/p normal breast exam: mammogram uptodate  -s/p normal pelvic exam:   -Pap collected  -STD testing: declined  -colonoscopy: uptodate  -atrophic vagina:   -hot flashes: wants to use OTC meds for now  -urine with odor: rx for macrobid sent        daylin maki MD

## 2023-08-04 NOTE — PATIENT INSTRUCTIONS
Hot flashes: over the counter Black cohosh  Estroven    Prescription medication like femhart or prempro    Call  to schedule mammogram

## 2023-08-14 LAB
FINAL PATHOLOGIC DIAGNOSIS: ABNORMAL
Lab: ABNORMAL

## 2023-08-18 LAB
HPV HR 12 DNA SPEC QL NAA+PROBE: NEGATIVE
HPV16 AG SPEC QL: NEGATIVE
HPV18 DNA SPEC QL NAA+PROBE: NEGATIVE

## 2023-10-23 ENCOUNTER — OCCUPATIONAL HEALTH (OUTPATIENT)
Dept: URGENT CARE | Facility: CLINIC | Age: 58
End: 2023-10-23
Payer: COMMERCIAL

## 2023-10-23 ENCOUNTER — OFFICE VISIT (OUTPATIENT)
Dept: URGENT CARE | Facility: CLINIC | Age: 58
End: 2023-10-23
Payer: COMMERCIAL

## 2023-10-23 VITALS
TEMPERATURE: 98 F | DIASTOLIC BLOOD PRESSURE: 87 MMHG | OXYGEN SATURATION: 96 % | BODY MASS INDEX: 33.27 KG/M2 | HEART RATE: 63 BPM | RESPIRATION RATE: 18 BRPM | SYSTOLIC BLOOD PRESSURE: 137 MMHG | HEIGHT: 67 IN | WEIGHT: 212 LBS

## 2023-10-23 DIAGNOSIS — S29.011A MUSCLE STRAIN OF CHEST WALL, INITIAL ENCOUNTER: Primary | ICD-10-CM

## 2023-10-23 DIAGNOSIS — S29.019A THORACIC MYOFASCIAL STRAIN, INITIAL ENCOUNTER: ICD-10-CM

## 2023-10-23 DIAGNOSIS — Z02.83 ENCOUNTER FOR DRUG SCREENING: Primary | ICD-10-CM

## 2023-10-23 LAB — BREATH ALCOHOL: 0

## 2023-10-23 PROCEDURE — 80305 DRUG TEST PRSMV DIR OPT OBS: CPT | Mod: S$GLB,,,

## 2023-10-23 PROCEDURE — 80305 OOH COLLECTION ONLY DRUG SCREEN: ICD-10-PCS | Mod: S$GLB,,,

## 2023-10-23 PROCEDURE — 99204 OFFICE O/P NEW MOD 45 MIN: CPT | Mod: S$GLB,,,

## 2023-10-23 PROCEDURE — 82075 POCT BREATH ALCOHOL TEST: ICD-10-PCS | Mod: S$GLB,,,

## 2023-10-23 PROCEDURE — 82075 ASSAY OF BREATH ETHANOL: CPT | Mod: S$GLB,,,

## 2023-10-23 PROCEDURE — 99204 PR OFFICE/OUTPT VISIT, NEW, LEVL IV, 45-59 MIN: ICD-10-PCS | Mod: S$GLB,,,

## 2023-10-23 RX ORDER — METHOCARBAMOL 500 MG/1
TABLET, FILM COATED ORAL
Qty: 40 TABLET | Refills: 0 | Status: SHIPPED | OUTPATIENT
Start: 2023-10-23

## 2023-10-23 RX ORDER — ACETAMINOPHEN 500 MG
1000 TABLET ORAL EVERY 8 HOURS PRN
Qty: 30 TABLET | Refills: 0 | Status: SHIPPED | OUTPATIENT
Start: 2023-10-23 | End: 2023-11-10 | Stop reason: SDUPTHER

## 2023-10-23 NOTE — LETTER
[facility] - [facility]  [address] [address]  Phone: [phone]  Fax: [phone]  [facility]: [id number]    Pt Name: Talya Bates  Injury Date: 10/23/2023   Employee ID: [id number] Date of First Treatment: 10/23/2023   Company: [employer]      Appointment Time:  Arrived: 9:45 AM    Provider: Barrington Prieto, DO Time Out:11:35 AM      Office Treatment:   1. Muscle strain of chest wall, initial encounter    2. Thoracic myofascial strain, initial encounter      Medications Ordered This Encounter   Medications    acetaminophen (TYLENOL) 500 MG tablet    methocarbamoL (ROBAXIN) 500 MG Tab           Restrictions: Disabled until next office visit, Home today     Return Appointment: 10/24/2023 at 10:30 AM HELEN

## 2023-10-24 ENCOUNTER — OFFICE VISIT (OUTPATIENT)
Dept: URGENT CARE | Facility: CLINIC | Age: 58
End: 2023-10-24
Payer: COMMERCIAL

## 2023-10-24 VITALS
OXYGEN SATURATION: 96 % | WEIGHT: 212 LBS | HEART RATE: 70 BPM | HEIGHT: 67 IN | SYSTOLIC BLOOD PRESSURE: 129 MMHG | BODY MASS INDEX: 33.27 KG/M2 | TEMPERATURE: 99 F | DIASTOLIC BLOOD PRESSURE: 78 MMHG

## 2023-10-24 DIAGNOSIS — S29.019D THORACIC MYOFASCIAL STRAIN, SUBSEQUENT ENCOUNTER: ICD-10-CM

## 2023-10-24 DIAGNOSIS — Z02.6 ENCOUNTER RELATED TO WORKER'S COMPENSATION CLAIM: Primary | ICD-10-CM

## 2023-10-24 DIAGNOSIS — S29.011D MUSCLE STRAIN OF CHEST WALL, SUBSEQUENT ENCOUNTER: ICD-10-CM

## 2023-10-24 PROCEDURE — 99212 PR OFFICE/OUTPT VISIT, EST, LEVL II, 10-19 MIN: ICD-10-PCS | Mod: S$GLB,,, | Performed by: SURGERY

## 2023-10-24 PROCEDURE — 99212 OFFICE O/P EST SF 10 MIN: CPT | Mod: S$GLB,,, | Performed by: SURGERY

## 2023-10-24 NOTE — LETTER
[facility]  [address] [address]  Phone: [phone]  Fax: [phone]  [facility]: [id number]    Pt Name: Talya Bates  Injury Date: 10/23/2023   Employee ID: [id number] Date of First Treatment: 10/24/2023   Company: [employer]      Appointment Time: 10:15 AM Arrived: 10:21 AM   Provider: Colleen Min MD Time Out:11:12 AM     Office Treatment:   1. Encounter related to worker's compensation claim    2. Muscle strain of chest wall, subsequent encounter    3. Thoracic myofascial strain, subsequent encounter          Patient Instructions: Daily home exercises/warm soaks        Restrictions: No lifting/pushing/pulling more than 10 lbs, No above the shoulder/overhead work     Return Appointment: 10/26/2023 at 10:30 AM    KW

## 2023-10-24 NOTE — PROGRESS NOTES
Subjective:      Patient ID: Talya Bates is a 58 y.o. female.    Chief Complaint: Flank Pain (Right ) and Back Pain    Patient's place of employment - [employer]  Patient's job title -   Date of injury - 10/23/2023  Body part injured - Right Side  Current work status per last visit - Regular Duty   Improved, same, or worse - Same  Pain Scale right now (1-10) -  9/10      Flank Pain  This is a new problem. The current episode started yesterday. The problem occurs constantly. The problem is unchanged. The pain is present in the costovertebral angle and sacro-iliac. The quality of the pain is described as aching and stabbing. The pain does not radiate. The pain is at a severity of 9/10. The pain is moderate. The pain is The same all the time. The symptoms are aggravated by sitting, bending, position, standing and twisting. Pertinent negatives include no numbness. She has tried bed rest for the symptoms. The treatment provided no relief.       Constitution: Positive for activity change and generalized weakness.   HENT: Negative.     Neck: neck negative.   Eyes: Negative.    Respiratory: Negative.     Gastrointestinal: Negative.    Musculoskeletal:  Positive for pain, joint pain, abnormal ROM of joint, back pain, muscle cramps and muscle ache. Negative for joint swelling and history of spine disorder.   Skin: Negative.  Negative for bruising.   Neurological:  Negative for numbness and tingling.   Psychiatric/Behavioral:  Positive for sleep disturbance.        See MA note. Ector MYERS note:    Talya Bates is a 58 y.o. female presenting for follow-up of right-sided chest wall strain.  Pain is very minimally improved from yesterday, now 9/10 instead of 10/10.  She took muscle relaxer once yesterday and has been using extra-strength Tylenol, she was also applied ice compresses.  She reports the pain is tight in quality worse with lifting arms and other upper body movements, like mopping.  She is  amenable to work restrictions.    Objective:     Physical Exam  Vitals and nursing note reviewed.   HENT:      Head: Normocephalic.   Eyes:      Conjunctiva/sclera: Conjunctivae normal.   Pulmonary:      Effort: Pulmonary effort is normal.   Chest:      Chest wall: Tenderness present.       Musculoskeletal:      Thoracic back: Spasms present.        Back:       Comments: TTP along the lower posterior ribs extending toward the anterior chest wall, TTP down the midaxillary line along   Neurological:      General: No focal deficit present.      Mental Status: She is alert and oriented to person, place, and time.      Motor: Motor function is intact.      Coordination: Coordination is intact.   Psychiatric:         Attention and Perception: Attention normal.         Mood and Affect: Mood normal.         Speech: Speech normal.         Behavior: Behavior normal. Behavior is cooperative.         Thought Content: Thought content normal.        Assessment:      1. Encounter related to worker's compensation claim    2. Muscle strain of chest wall, subsequent encounter    3. Thoracic myofascial strain, subsequent encounter      Plan:     Right arm ROM remains limited by pain. We discussed the benefits of taking the prescribed medications on a schedule for the next few days, including trying the Robaxin during the day if she finds that she they are unable to accommodate modified duty status. She has agreed to try. Work restrictions are given. Follow-up in 2 days.     Diagnoses and plan discussed with the patient, as well as the expected course and duration of symptoms. Risks and benefits of any medication prescribed during this visit was explained, verbal instructions on use given. Clinic/Emergency department return precautions were given, can return to [facility] before scheduled follow-up appointment if notes worsening/aggravation of symptoms. All questions and concerns were addressed prior to discharge. Plan was developed  with active input from the patient and they verbalized understanding of and agreement with the POC.   Note was dictated with voice recognition software, please excuse any grammatical errors.     Patient Instructions: Daily home exercises/warm soaks   Restrictions: No lifting/pushing/pulling more than 10 lbs, No above the shoulder/overhead work  Follow up in about 2 days (around 10/26/2023).

## 2023-10-26 ENCOUNTER — OFFICE VISIT (OUTPATIENT)
Dept: URGENT CARE | Facility: CLINIC | Age: 58
End: 2023-10-26
Payer: COMMERCIAL

## 2023-10-26 VITALS
SYSTOLIC BLOOD PRESSURE: 149 MMHG | RESPIRATION RATE: 20 BRPM | BODY MASS INDEX: 33.34 KG/M2 | DIASTOLIC BLOOD PRESSURE: 92 MMHG | TEMPERATURE: 98 F | HEIGHT: 68 IN | OXYGEN SATURATION: 98 % | HEART RATE: 64 BPM | WEIGHT: 220 LBS

## 2023-10-26 DIAGNOSIS — Z02.6 ENCOUNTER RELATED TO WORKER'S COMPENSATION CLAIM: ICD-10-CM

## 2023-10-26 DIAGNOSIS — S29.011D MUSCLE STRAIN OF CHEST WALL, SUBSEQUENT ENCOUNTER: Primary | ICD-10-CM

## 2023-10-26 DIAGNOSIS — S29.019D THORACIC MYOFASCIAL STRAIN, SUBSEQUENT ENCOUNTER: ICD-10-CM

## 2023-10-26 PROCEDURE — 99214 PR OFFICE/OUTPT VISIT, EST, LEVL IV, 30-39 MIN: ICD-10-PCS | Mod: S$GLB,,, | Performed by: STUDENT IN AN ORGANIZED HEALTH CARE EDUCATION/TRAINING PROGRAM

## 2023-10-26 PROCEDURE — 99214 OFFICE O/P EST MOD 30 MIN: CPT | Mod: S$GLB,,, | Performed by: STUDENT IN AN ORGANIZED HEALTH CARE EDUCATION/TRAINING PROGRAM

## 2023-10-26 NOTE — PROGRESS NOTES
Subjective:      Patient ID: Talya Bates is a 58 y.o. female.    Chief Complaint: Back Pain (DOI 10/23/23 -Back RIGHT SIDE)    Patient's place of employment - [employer]  Patient's job title -    Date of Injury - 10/23/23  Body part injured - Back / Right Side  Current work status per last visit - [employer]   Improved, same, or worse - Same  Pain Scale right now (1-10) -  5/10    See MA note above. Begin MD note:   Ms. Tom Bates says her symptoms are about the same since her visit 2 days ago. She is taking methocarbamol in the evenings only because it causes too much drowsiness. She says her symptoms are unchanged and she has tightness in the right axillary region to her right mid back. No radiation of pain into right arm or leg. Denies numbness or tingling. She has CKD and unable to take any NSAIDs but is also using Tylenol. She is working with restrictions without any complications.     Back Pain  This is a new problem. The current episode started in the past 7 days. The problem occurs constantly. The problem has been waxing and waning since onset. The pain is present in the thoracic spine. The quality of the pain is described as aching and cramping. The pain does not radiate. The pain is at a severity of 5/10. The pain is mild. The pain is The same all the time. The symptoms are aggravated by twisting, bending and position. Stiffness is present All day. She has tried heat, ice, NSAIDs and muscle relaxant for the symptoms. The treatment provided mild relief.       Musculoskeletal:  Positive for pain and back pain.     Objective:     Physical Exam  Vitals and nursing note reviewed.   Constitutional:       General: She is not in acute distress.     Appearance: She is not ill-appearing.   HENT:      Head: Normocephalic.   Eyes:      Conjunctiva/sclera: Conjunctivae normal.   Pulmonary:      Effort: No respiratory distress.   Musculoskeletal:      Comments: Pain to right axillary muscles  and mid thoracic muscles with rotation to the left only. No pain reported with other trunk or shoulder ROM   Skin:     General: Skin is warm and dry.   Neurological:      Mental Status: She is alert and oriented to person, place, and time.   Psychiatric:         Attention and Perception: Attention normal.         Mood and Affect: Mood normal.         Behavior: Behavior normal.        Assessment:      1. Muscle strain of chest wall, subsequent encounter    2. Encounter related to worker's compensation claim    3. Thoracic myofascial strain, subsequent encounter      Plan:     Provided patient with home stretches to do for her mid back and encouraged use of diclofenac gel on the affected areas since she cannot take oral NSAIDs. Ok to continue methocarbamol qpm due to drowsiness as well as tylenol prn. Briefly discussed PT and will further discuss at her visit next week after a trial with the home exercises and use of topical anti-inflammatory. Continue work limitations.        Patient Instructions: Daily home exercises/warm soaks, Attention not to aggravate affected area   Restrictions: No lifting/pushing/pulling more than 10 lbs, No above the shoulder/overhead work  Follow up in about 1 week (around 11/2/2023).

## 2023-10-26 NOTE — LETTER
[facility]  [address] [address]  Phone: [phone]  Fax: [phone]  [facility]: [email or web address]    Pt Name: Talya Bates  Injury Date: 10/23/2023   Employee ID: [id number] Date of Treatment: 10/26/2023   Company: [employer]      Appointment Time: 10:30 AM Arrived: 10:21 AM    Provider: Erika Winslow MD Time Out:11:48 AM      Office Treatment:   1. Muscle strain of chest wall, subsequent encounter    2. Encounter related to worker's compensation claim    3. Thoracic myofascial strain, subsequent encounter          Patient Instructions: Daily home exercises/warm soaks, Attention not to aggravate affected area      Restrictions: No lifting/pushing/pulling more than 10 lbs, No above the shoulder/overhead work     Return Appointment: 11/2/2023 at 9:45 AM HELEN

## 2023-10-27 ENCOUNTER — OFFICE VISIT (OUTPATIENT)
Dept: URGENT CARE | Facility: CLINIC | Age: 58
End: 2023-10-27
Payer: COMMERCIAL

## 2023-10-27 VITALS
RESPIRATION RATE: 16 BRPM | OXYGEN SATURATION: 98 % | BODY MASS INDEX: 33.34 KG/M2 | WEIGHT: 220 LBS | HEART RATE: 72 BPM | DIASTOLIC BLOOD PRESSURE: 81 MMHG | HEIGHT: 68 IN | SYSTOLIC BLOOD PRESSURE: 144 MMHG

## 2023-10-27 DIAGNOSIS — S29.011D MUSCLE STRAIN OF CHEST WALL, SUBSEQUENT ENCOUNTER: ICD-10-CM

## 2023-10-27 DIAGNOSIS — Z02.6 ENCOUNTER RELATED TO WORKER'S COMPENSATION CLAIM: ICD-10-CM

## 2023-10-27 DIAGNOSIS — S29.019D THORACIC MYOFASCIAL STRAIN, SUBSEQUENT ENCOUNTER: Primary | ICD-10-CM

## 2023-10-27 PROCEDURE — 99212 PR OFFICE/OUTPT VISIT, EST, LEVL II, 10-19 MIN: ICD-10-PCS | Mod: S$GLB,,, | Performed by: STUDENT IN AN ORGANIZED HEALTH CARE EDUCATION/TRAINING PROGRAM

## 2023-10-27 PROCEDURE — 99212 OFFICE O/P EST SF 10 MIN: CPT | Mod: S$GLB,,, | Performed by: STUDENT IN AN ORGANIZED HEALTH CARE EDUCATION/TRAINING PROGRAM

## 2023-10-27 NOTE — LETTER
[facility]  [address] [address]  Phone: [phone]  Fax: [phone]  [facility]: [email or web address]    Pt Name: Talya Bates  Injury Date: 10/23/2023   Employee ID: [id number] Date of Treatment: 10/27/2023   Company: [employer]      Appointment Time: Arrived: 10:23 AM    Provider: Erika Winslow MD Time Out:11:12 AM      Office Treatment:   1. Encounter related to worker's compensation claim          Patient Instructions: Attention not to aggravate affected area      Restrictions: Regular Duty     Return Appointment: 11/2/2023 at 9:45 AM HELEN

## 2023-10-27 NOTE — PROGRESS NOTES
Subjective:      Patient ID: Talya Bates is a 58 y.o. female.    Chief Complaint: No chief complaint on file.    Patient's place of employment - [employer]  Patient's job title -   Date of Injury - 10.23.2023  Body part injured - Right shoulder and right flank area  Current work status per last visit - limited duty. States she is here to review the work restrictions placed at prior visit.  Improved, same, or worse - Improving  Pain Scale right now (1-10) -  6/10      See MA note above. Begin MD note:  Mrs. Bates resturns to clinic with request to have her work restrictions lifted. She says her employer told her that she would not be able to return for 2 weeks if she was not able to perform her regular job duties. She can not afford to be out of work due to no vacation time and she would not receive her regular paycheck.       Constitution: Negative.   HENT: Negative.     Neck: neck negative.   Cardiovascular: Negative.    Eyes: Negative.    Respiratory: Negative.     Gastrointestinal: Negative.    Endocrine: negative.   Genitourinary: Negative.    Musculoskeletal:  Positive for pain.   Skin: Negative.    Allergic/Immunologic: Negative.    Neurological: Negative.    Hematologic/Lymphatic: Negative.    Psychiatric/Behavioral: Negative.       Objective:     Physical Exam  Vitals and nursing note reviewed.   Constitutional:       General: She is not in acute distress.     Appearance: She is not ill-appearing.   HENT:      Head: Normocephalic.   Eyes:      Conjunctiva/sclera: Conjunctivae normal.   Pulmonary:      Effort: No respiratory distress.   Skin:     General: Skin is warm and dry.   Neurological:      Mental Status: She is alert and oriented to person, place, and time.   Psychiatric:         Attention and Perception: Attention normal.         Mood and Affect: Mood normal.         Behavior: Behavior normal.        Assessment:      1. Thoracic myofascial strain, subsequent encounter    2.  Encounter related to worker's compensation claim    3. Muscle strain of chest wall, subsequent encounter      Plan:     We discussed lifting the restrictions and she has been returned to regular duty effective today. Recommended that she try her best to avoid aggravating activities. Continue the rest of the treatment plan as outlined at yesterday's visit.        Patient Instructions: Attention not to aggravate affected area   Restrictions: Regular Duty  No follow-ups on file.

## 2023-11-10 ENCOUNTER — OFFICE VISIT (OUTPATIENT)
Dept: URGENT CARE | Facility: CLINIC | Age: 58
End: 2023-11-10
Payer: COMMERCIAL

## 2023-11-10 VITALS
BODY MASS INDEX: 33.34 KG/M2 | SYSTOLIC BLOOD PRESSURE: 153 MMHG | DIASTOLIC BLOOD PRESSURE: 89 MMHG | HEIGHT: 68 IN | HEART RATE: 76 BPM | WEIGHT: 220 LBS | OXYGEN SATURATION: 97 % | RESPIRATION RATE: 18 BRPM

## 2023-11-10 DIAGNOSIS — S29.019A THORACIC MYOFASCIAL STRAIN, INITIAL ENCOUNTER: ICD-10-CM

## 2023-11-10 DIAGNOSIS — Z02.6 ENCOUNTER RELATED TO WORKER'S COMPENSATION CLAIM: Primary | ICD-10-CM

## 2023-11-10 DIAGNOSIS — S29.011A MUSCLE STRAIN OF CHEST WALL, INITIAL ENCOUNTER: ICD-10-CM

## 2023-11-10 DIAGNOSIS — S29.019D THORACIC MYOFASCIAL STRAIN, SUBSEQUENT ENCOUNTER: ICD-10-CM

## 2023-11-10 DIAGNOSIS — S29.011D MUSCLE STRAIN OF CHEST WALL, SUBSEQUENT ENCOUNTER: ICD-10-CM

## 2023-11-10 PROCEDURE — 99213 PR OFFICE/OUTPT VISIT, EST, LEVL III, 20-29 MIN: ICD-10-PCS | Mod: S$GLB,,, | Performed by: SURGERY

## 2023-11-10 PROCEDURE — 99213 OFFICE O/P EST LOW 20 MIN: CPT | Mod: S$GLB,,, | Performed by: SURGERY

## 2023-11-10 RX ORDER — ACETAMINOPHEN 500 MG
1000 TABLET ORAL EVERY 8 HOURS PRN
Qty: 30 TABLET | Refills: 0 | Status: SHIPPED | OUTPATIENT
Start: 2023-11-10

## 2023-11-10 NOTE — PROGRESS NOTES
.Subjective:      Patient ID: Talya Bates is a 58 y.o. female.    Chief Complaint: Shoulder Pain (RT)    Patient's place of employment - [employer]  Patient's job title -   Date of Injury - 10-23-23  Body part injured - RT Shoulder  Current work status per last visit - Regular Duty  Improved, same, or worse - Slightly Improved  Pain Scale right now (1-10) -  4/10    Knee Pain   Pertinent negatives include no numbness.       Musculoskeletal:  Positive for joint pain, abnormal ROM of joint and muscle ache. Negative for pain, trauma, joint swelling and muscle cramps.   Skin:  Negative for erythema and bruising.   Neurological:  Negative for numbness and tingling.     See MA note above. Begin MD note:    Talya Bates is a 58 y.o. female presenting for follow-up of right chest wall pain. She has noted some improvements with home exercises provided at last OV, but continues to have pain with movements above the shoulder and if trying to  something for prolonged period. The extra strength tylenol is helping with the pain, she is using the robaxin immediately when she gets home in the afternoon and right before bedtime. She is working regular duty and is making sure to avoid doing any tasks that may aggravate her symptoms. She is requesting refill of tylenol.     Objective:     Physical Exam  Vitals and nursing note reviewed.   HENT:      Head: Normocephalic.   Eyes:      Conjunctiva/sclera: Conjunctivae normal.   Pulmonary:      Effort: Pulmonary effort is normal.   Chest:      Chest wall: Tenderness present.       Musculoskeletal:      Right shoulder: Decreased range of motion (to 90° on forward flexion and abduction).      Thoracic back: Tenderness present.        Back:       Comments: TTP along the lateral thoracic musculature, the mid axillary line over the ribs, and anterior chest wall below the breast.    Skin:     Findings: No erythema.   Neurological:      Mental Status: She is  alert.          Assessment:      1. Encounter related to worker's compensation claim    2. Muscle strain of chest wall, subsequent encounter    3. Thoracic myofascial strain, subsequent encounter    4. Muscle strain of chest wall, initial encounter    5. Thoracic myofascial strain, initial encounter      Plan:     She is having slow improvement in pain symptoms. She would benefit from a course of PT to alleviate tension and improve mobility to get her back to her baseline. She feels able to continue on regular duty at this time, no work restrictions are given. We will follow-up in 3 weeks to assess how she is fairing with PT. Medication refill was provided.     Diagnoses and plan discussed with the patient, as well as the expected course and duration of symptoms. Risks and benefits of any medication prescribed during this visit was explained, verbal instructions on use given. Clinic/Emergency department return precautions were given, can return to [facility] before scheduled follow-up appointment if notes worsening/aggravation of symptoms. All questions and concerns were addressed prior to discharge. Plan was developed with active input from the patient and they verbalized understanding of and agreement with the POC.   Note was dictated with voice recognition software, please excuse any grammatical errors.    Medications Ordered This Encounter   Medications    acetaminophen (TYLENOL) 500 MG tablet     Sig: Take 2 tablets (1,000 mg total) by mouth every 8 (eight) hours as needed for Pain.     Dispense:  30 tablet     Refill:  0     Patient Instructions: Attention not to aggravate affected area, Begin Physical Therapy, PT to be scheduled once authorized   Restrictions: Regular Duty  Follow up in about 3 weeks (around 12/1/2023).

## 2023-11-10 NOTE — LETTER
[facility]  [address] [address]  Phone: [phone]  Fax: [phone]  [facility] [employer]: [email or web address]    Pt Name: Talya Bates  Injury Date:  10/23/23   Employee ID: [id number] Date of Treatment: 11/10/2023   Company: [employer]      Appointment Time:  Arrived: 10:00 AM    Provider: Colleen Min MD Time Out:10:52 AM      Office Treatment:   1. Encounter related to worker's compensation claim    2. Muscle strain of chest wall, subsequent encounter    3. Thoracic myofascial strain, subsequent encounter    4. Muscle strain of chest wall, initial encounter    5. Thoracic myofascial strain, initial encounter      Medications Ordered This Encounter   Medications    acetaminophen (TYLENOL) 500 MG tablet        Patient Instructions: Attention not to aggravate affected area, Begin Physical Therapy, PT to be scheduled once authorized      Restrictions: Regular Duty     Return Appointment: 12/1/2023 at 9:00 AM HEL[facility]

## 2023-11-27 ENCOUNTER — OFFICE VISIT (OUTPATIENT)
Dept: SLEEP MEDICINE | Facility: CLINIC | Age: 58
End: 2023-11-27
Payer: COMMERCIAL

## 2023-11-27 VITALS — WEIGHT: 220 LBS | BODY MASS INDEX: 33.34 KG/M2 | HEIGHT: 68 IN

## 2023-11-27 DIAGNOSIS — R35.1 NOCTURIA: ICD-10-CM

## 2023-11-27 DIAGNOSIS — R53.83 FATIGUE, UNSPECIFIED TYPE: ICD-10-CM

## 2023-11-27 DIAGNOSIS — R06.83 SNORING: ICD-10-CM

## 2023-11-27 DIAGNOSIS — I10 HYPERTENSION, UNSPECIFIED TYPE: Primary | ICD-10-CM

## 2023-11-27 DIAGNOSIS — G47.30 SLEEP APNEA, UNSPECIFIED TYPE: ICD-10-CM

## 2023-11-27 PROCEDURE — 99999 PR PBB SHADOW E&M-EST. PATIENT-LVL II: ICD-10-PCS | Mod: PBBFAC,,, | Performed by: INTERNAL MEDICINE

## 2023-11-27 PROCEDURE — 3008F PR BODY MASS INDEX (BMI) DOCUMENTED: ICD-10-PCS | Mod: CPTII,S$GLB,, | Performed by: INTERNAL MEDICINE

## 2023-11-27 PROCEDURE — 99204 OFFICE O/P NEW MOD 45 MIN: CPT | Mod: S$GLB,,, | Performed by: INTERNAL MEDICINE

## 2023-11-27 PROCEDURE — 99999 PR PBB SHADOW E&M-EST. PATIENT-LVL II: CPT | Mod: PBBFAC,,, | Performed by: INTERNAL MEDICINE

## 2023-11-27 PROCEDURE — 3008F BODY MASS INDEX DOCD: CPT | Mod: CPTII,S$GLB,, | Performed by: INTERNAL MEDICINE

## 2023-11-27 PROCEDURE — 99204 PR OFFICE/OUTPT VISIT, NEW, LEVL IV, 45-59 MIN: ICD-10-PCS | Mod: S$GLB,,, | Performed by: INTERNAL MEDICINE

## 2023-11-27 NOTE — PROGRESS NOTES
Referred by Lee Mcnair MD     NEW PATIENT VISIT    Talya Bates  is a pleasant 58 y.o. female  with PMH significant for HTN, memory loss, CKDs/p txp 2022, SLE who presents for sleep evaluation of fatigue    SLEEP SCHEDULE   Environment    Bed Time 11:30P   Sleep Latency 30 minutes   Arousals 3 times   Nocturia 3 times   Back to sleep Not long   Wake time 4:30A   Naps sometimes   Work           Past Medical History:   Diagnosis Date    Acid reflux     Alopecia     Anemia     Anxiety     Arthritis     Blood transfusion     Chronic kidney disease     Deep vein thrombosis 2012    left leg; completed anticoagulation; seen by heme/onc    Dry eyes     Dry mouth     Lupus      Patient Active Problem List   Diagnosis    Hemolytic anemia    Vitamin D deficiency disease    Joint pain    Lupus (systemic lupus erythematosus)    Encounter for long-term (current) use of other medications    Fatigue    Rash    Leg swelling    Leg pain, bilateral    Chest pain    Abdominal bloating    Memory loss    Oral ulcer    Lupus nephritis    Immunosuppression    Nausea and vomiting    CKD (chronic kidney disease), stage IV    MARKEL (acute kidney injury)    Bleeding hemorrhoid    Hyperkalemia    Acute renal failure with specified lesion    Elevated blood pressure reading    Autoimmune hemolytic anemia       Current Outpatient Medications:     acetaminophen (TYLENOL) 500 MG tablet, Take 2 tablets (1,000 mg total) by mouth every 8 (eight) hours as needed for Pain., Disp: 30 tablet, Rfl: 0    diclofenac sodium (VOLTAREN) 1 % Gel, Apply 2 g topically 4 (four) times daily., Disp: 150 g, Rfl: 2    estradioL (VAGIFEM) 10 mcg Tab, Place 1 tablet (10 mcg total) vaginally twice a week., Disp: 8 tablet, Rfl: 12    furosemide (LASIX) 20 MG tablet, Take 1 tablet by mouth., Disp: , Rfl:     guaifenesin-codeine 100-10 mg/5 ml (TUSSI-ORGANIDIN NR)  mg/5 mL syrup, Virtussin AC 10 mg-100 mg/5 mL oral liquid  TK 5 ML PO Q 4 TO 6 H PRF COUGH,  "Disp: , Rfl:     HYDROcodone-acetaminophen (NORCO) 5-325 mg per tablet, hydrocodone 5 mg-acetaminophen 325 mg tablet, Disp: , Rfl:     losartan (COZAAR) 25 MG tablet, Take 25 mg by mouth., Disp: , Rfl:     melatonin-pyridoxine, vit B6, 5-1 mg Tab, Melatonin (with B6) 5 mg-1 mg tablet  Take by oral route., Disp: , Rfl:     methocarbamoL (ROBAXIN) 500 MG Tab, Take one tablet every 8 hours PRN pain/spasm, Disp: 40 tablet, Rfl: 0    methylPREDNISolone (MEDROL DOSEPACK) 4 mg tablet, FPD, Disp: , Rfl:     methylPREDNISolone (MEDROL) 4 MG Tab, methylprednisolone 4 mg tablet  TAKE 1 TABLET BY MOUTH EVERY DAY., Disp: , Rfl:     mycophenolate (CELLCEPT) 250 mg Cap, mycophenolate mofetil 250 mg capsule, Disp: , Rfl:     ondansetron (ZOFRAN-ODT) 4 MG TbDL, ondansetron 4 mg disintegrating tablet, Disp: , Rfl:     oxybutynin (DITROPAN XL) 10 MG 24 hr tablet, Take 1 tablet (10 mg total) by mouth once daily., Disp: 30 tablet, Rfl: 12    pantoprazole (PROTONIX) 40 MG tablet, Take 1 tablet (40 mg total) by mouth once daily., Disp: 90 tablet, Rfl: 0    predniSONE (DELTASONE) 10 MG tablet, prednisone 10 mg tablet  TK 3 TS PO QD, Disp: , Rfl:     sodium bicarbonate 650 MG tablet, Take 2 tablets (1,300 mg total) by mouth 2 (two) times daily., Disp: 120 tablet, Rfl: 1     Vitals:    11/27/23 1619   Weight: 99.8 kg (220 lb)   Height: 5' 8" (1.727 m)     Physical Exam:    GEN:   Well-appearing  Psych:  Appropriate affect, demonstrates insight  SKIN:  No rash on the face or bridge of the nose      LABS:   No results found for: "HGB", "CO2"    RECORDS REVIEWED PREVIOUSLY:        ASSESSMENT      PROBLEM DESCRIPTION/ Sx on Presentation  STATUS   suspected KRYSTYNA   + snoring, + witnessed  apneas by her     New   Daytime Sx   denies sleepiness when inactive, but feels tired  Reports TST opportunity about 5 hrs  ESS 1/24 on intake  New   Nocturia   x 3 per sleep period  New   Other issues:     PLAN     -recommend sleep testing   -discussed " trial therapy if KRYSTYNA present and the patient is  open to a trial of CPAP therapy    RTC          The patient was given open opportunity to ask questions and/or express concerns about treatment plan.   All questions/concerns were discussed.     Two patient identifiers used prior to evaluation.

## 2023-12-01 ENCOUNTER — OFFICE VISIT (OUTPATIENT)
Dept: URGENT CARE | Facility: CLINIC | Age: 58
End: 2023-12-01
Payer: COMMERCIAL

## 2023-12-01 VITALS
TEMPERATURE: 98 F | BODY MASS INDEX: 33.34 KG/M2 | HEIGHT: 68 IN | SYSTOLIC BLOOD PRESSURE: 135 MMHG | WEIGHT: 220 LBS | RESPIRATION RATE: 18 BRPM | OXYGEN SATURATION: 98 % | DIASTOLIC BLOOD PRESSURE: 79 MMHG | HEART RATE: 83 BPM

## 2023-12-01 DIAGNOSIS — S29.019D THORACIC MYOFASCIAL STRAIN, SUBSEQUENT ENCOUNTER: Primary | ICD-10-CM

## 2023-12-01 DIAGNOSIS — S29.011D MUSCLE STRAIN OF CHEST WALL, SUBSEQUENT ENCOUNTER: ICD-10-CM

## 2023-12-01 DIAGNOSIS — Z02.6 ENCOUNTER RELATED TO WORKER'S COMPENSATION CLAIM: ICD-10-CM

## 2023-12-01 PROCEDURE — 99214 PR OFFICE/OUTPT VISIT, EST, LEVL IV, 30-39 MIN: ICD-10-PCS | Mod: S$GLB,,, | Performed by: STUDENT IN AN ORGANIZED HEALTH CARE EDUCATION/TRAINING PROGRAM

## 2023-12-01 PROCEDURE — 99214 OFFICE O/P EST MOD 30 MIN: CPT | Mod: S$GLB,,, | Performed by: STUDENT IN AN ORGANIZED HEALTH CARE EDUCATION/TRAINING PROGRAM

## 2023-12-01 NOTE — LETTER
[facility] [employer] [facility]  [address] [address]  Phone: [phone]  Fax: [phone]  [facility] [employer]: [email or web address]    Pt Name: Talya Bates  Injury Date: 10/23/2023   Employee ID: [id number] Date of Treatment: 12/01/2023   Company: [employer]      Appointment Time: 09:00 AM Arrived: 8:45 AM    Provider: Erika Winslow MD Time Out: 9:36 AM      Office Treatment:   1. Thoracic myofascial strain, subsequent encounter    2. Encounter related to worker's compensation claim    3. Muscle strain of chest wall, subsequent encounter          Patient Instructions:  (Begin and continue with physical therapy as soon as possible.)      Restrictions: Regular Duty     Return Appointment: 12/27/2023 at 9:30 AM [facility]

## 2023-12-01 NOTE — PROGRESS NOTES
Subjective:      Patient ID: Talya Bates is a 58 y.o. female.    Chief Complaint: Shoulder Injury    Patient's place of employment - [employer]  Patient's job title -   Date of Injury - 10-23-23  Body part injured - RT Shoulder  Current work status per last visit - Regular Duty  Improved, same, or worse - Improved  Pain Scale right now (1-10) -  0/10    KW     Ms. Tom Bates says her symptoms are mild and she has been able to continue working. She was approved for PT and has yet to start due to her work schedule. She is still taking tylenol and methocarbamol for her pain prn. Denies need for refills at this time. Her first PT appointment will be next week. No new complaints or concerns.      Musculoskeletal:  Positive for joint pain, abnormal ROM of joint and muscle ache. Negative for pain, trauma, joint swelling and muscle cramps.   Skin:  Negative for erythema and bruising.   Neurological:  Negative for numbness and tingling.     Objective:     Physical Exam  Vitals and nursing note reviewed.   Constitutional:       General: She is not in acute distress.     Appearance: She is not ill-appearing.   HENT:      Head: Normocephalic.   Eyes:      Conjunctiva/sclera: Conjunctivae normal.   Pulmonary:      Effort: No respiratory distress.   Musculoskeletal:      Comments: TTP right lateral thoracic region and posterior shoulder, extending to right torso along axillary line. Right shoulder flexion to 90 °, abduction to approximately 75°, unable to externally rotate and flex shoulder to place hand on upper back, able to internally rotate left shoulder to place hand lateral hip only   Skin:     General: Skin is warm and dry.      Findings: No erythema.   Neurological:      Mental Status: She is alert and oriented to person, place, and time.   Psychiatric:         Attention and Perception: Attention normal.         Mood and Affect: Mood normal.         Behavior: Behavior normal.        Assessment:       1. Thoracic myofascial strain, subsequent encounter    2. Encounter related to worker's compensation claim    3. Muscle strain of chest wall, subsequent encounter      Plan:     Despite low pain and subjective improve, Mrs. Bates still has significant limitation with her ROM. Counseled patient on need for therapy and discussing with her supervisor as they should accommodate the appointments. Continue regular duty. No med refills needed at this time per patient.       Patient Instructions:  (Begin and continue with physical therapy as soon as possible.)   Restrictions: Regular Duty  Follow up in about 26 days (around 12/27/2023).

## 2023-12-04 ENCOUNTER — CLINICAL SUPPORT (OUTPATIENT)
Dept: REHABILITATION | Facility: HOSPITAL | Age: 58
End: 2023-12-04
Payer: COMMERCIAL

## 2023-12-04 DIAGNOSIS — M53.86 DECREASED ROM OF INTERVERTEBRAL DISCS OF LUMBAR SPINE: Primary | ICD-10-CM

## 2023-12-04 DIAGNOSIS — R53.1 DECREASED STRENGTH: ICD-10-CM

## 2023-12-04 PROCEDURE — 97110 THERAPEUTIC EXERCISES: CPT | Mod: PN

## 2023-12-04 PROCEDURE — 97161 PT EVAL LOW COMPLEX 20 MIN: CPT | Mod: PN

## 2023-12-04 NOTE — PLAN OF CARE
[facility]   Physical Therapy Workers' Compensation Initial Evaluation      Name: Talya Bates  Clinic Number: [id number]    Therapy Diagnosis:   Encounter Diagnoses   Name Primary?    Decreased ROM of intervertebral discs of lumbar spine Yes    Decreased strength      Physician: Colleen Min MD    Physician Orders: PT Eval and Treat  Medical Diagnosis from Referral:   Z02.6 (ICD-10-CM) - Encounter related to worker's compensation claim   S29.011D (ICD-10-CM) - Muscle strain of chest wall, subsequent encounter   S29.019D (ICD-10-CM) - Thoracic myofascial strain, subsequent encounter     Evaluation Date: 12/4/2023  Authorization Period Expiration: 11/9/24  Plan of Care Expiration: 2/2/24  Visit # / Visits authorized: 1/ 18    Foto  Date  Score    #1/3 12/4/23    #2/3     #3/3       Time In: 12:45 PM  Time Out: 1:30 PM  Total Appointment Time (timed & untimed codes): 45 minutes (1 LCE, 1 TE)    Precautions: Standard, Lupus, Kidney disease    Subjective     Date of injury: 10/23/23  History of current condition - She was at work moving a heavy table so she can mop the floor. As she lifted and turned the table to her side, she felt pain at the right side of her lower to mid back area. No pain below low back, but did have some flank and anterior right sided chest pain briefly as well. She reports pain in right backside is more feelings of tightness, and she actually irritated it again today when picking up a vacuum  using her right arm. She did feel like her back pain was improving and more of a tightness overall. Denies any numbness or tingling. Pain is better on her days off; no disturbed sleep.     Prior medical treatment: muscle relaxers    Occupation/job title: [employer]   Job demands: bending, stooping, walking/standing, lifting 50-60# at a time, sweeping/mopping/vacuuming     Current work restrictions: Regular duty  Previous work status: full  time  Current work status: full time  Date last worked (if applicable): did take 1-2 days off of after injury    Imaging: none for low back    Social History: H    Pain:  Current 7/10, worst 9/10, best 4/10   Location: right side of low back  Description: tightness, aching  Aggravating Factors: lifting, bending, carrying  Easing Factors: resting    Pt's goals: Return to employment according to previous level of function    Medical History:   Past Medical History:   Diagnosis Date    Acid reflux     Alopecia     Anemia     Anxiety     Arthritis     Blood transfusion     Chronic kidney disease     Deep vein thrombosis 2012    left leg; completed anticoagulation; seen by heme/onc    Dry eyes     Dry mouth     Lupus        Surgical History:   Talya Bates  has a past surgical history that includes  section; Tubal ligation; Skin surgery; and kidney transplant.    Medications:   Talya has a current medication list which includes the following prescription(s): acetaminophen, diclofenac sodium, estradiol, furosemide, guaifenesin-codeine 100-10 mg/5 ml, hydrocodone-acetaminophen, losartan, melatonin-pyridoxine (vit b6), methocarbamol, methylprednisolone, methylprednisolone, mycophenolate, ondansetron, oxybutynin, pantoprazole, prednisone, and sodium bicarbonate.    Allergies:   Review of patient's allergies indicates:   Allergen Reactions    Ciprofloxacin Hives    Avelox [moxifloxacin] Rash    Iodinated contrast media Itching and Swelling    Reglan [metoclopramide hcl] Other (See Comments)     Nervous and paranoid    Amoxicillin Edema     Face and lips became swollen with rash.        Objective      Gait: slow fredy  Posture: mild lumbar lordosis  DTR:    Right Left Comment   Patellar (L3-4) 2+ 2+    Achilles (S1) 2+ 2+    Sensation: WNL  Palpation: +tender to palpation at right lumbar paraspinals    A/PROM and MMT:  * = right side of low back pain with testing  NT = Not tested  AROM: (degrees) LUMBAR    Flexion (40-50) 50%   Extension (15-20) 100%   Right side bending (~20) 100%   Left side bending  (~20) 90%   Right rotation (5-10) 100%   Left rotation (5-10) 75%     Shoulder AROM/MMT:    Flexion: 100% Bilateral, 4/5* right, 5/5 left   Abduction: 100% Bilateral. 5/5 Bilateral    Extension: 100% Bilateral, 5/5 Bilateral    External rotation: 100% Bilateral, 5/5 Bilateral      Trunk MMT:   Flexion: 5/5   Extensions: 5/5   Sidebendin+/5 for resisted right sidebending, 5/5 left sidebend  Rotation: 3+/5* for resisted right rotation, 5/5 left rotation     Hip  Right   Left  Pain/Dysfunction with Movement    AROM PROM MMT AROM PROM MMT    Flexion (L2,120 deg) WFL WFL 3+/5 WFL WFL 3+/5    Extension (20 deg) WFL WFL 4/5 WFL WFL 4/5    Abduction (L5, 45 deg) WFL WFL 4-/5 WFL WFL 4/5    Adduction (30 deg) WFL WFL 5/5 WFL WFL 5/5       Knee  Right   Left  Pain/Dysfunction with Movement    AROM PROM MMT AROM PROM MMT    Flexion (S2, 140 deg) WFL WFL 4/5 WFL WFL 4/5    Extension (L3, 0-5 deg) WFL WFL 4/5 WFL WFL 4/5      Ankle  Right   Left  Pain/Dysfunction with Movement    AROM PROM MMT AROM PROM MMT    Dorsiflexion (L4, 20 deg) WFL WFL 5/5 WFL WFL 5/5    Plantarflexion (S1, 50 deg) WFL WFL 5/5 WFL WFL 5/5    Great toe extension (L5, 70 deg) WFL WFL 5/5 WFL WFL 5/5      Lumbar Tests:  Slump test = (-) Bilateral   Quadrant test = (-) Bilateral   SLR Test (L4-S3) = (-) Bilateral   Lumbar distraction = NT  Ely's test (L2,L3,L4) = NT  Prone Instability Test = NT  Vertical compression, elbow flexion, lumbar protective mechanism testing: NT  Louie Test (thigh flat, 80 knee flexion) = NT  HS Length Test (80 degrees) = WNL  Leg length, hip level = WNL  Flick test = NT    Repeated Measures:  Lumbar flexion = increased pain  Lumbar extension = no change in pain    Cluster for stabilization:   Age < 40,  SLR > 91, +PIT, aberrant motion = Met 1/4    Cluster for spinal stenosis:   Bbeo s/s, leg > LBP, pain during walking/standing,  relief upon sitting, > 49 y/o = Met 1/5    Limitation/Restriction for FOTO Lumbar Survey    Therapist reviewed FOTO scores for Talya Bates on 12/4/2023.   FOTO documents entered into [employer] - see Media section.    Limitation Score: 63%  Predicted Score: 45%     Functional Job Specific Testing:   Job Specific Task Job Demands Current Ability   1. Lifting/carrying 50# Frequently No   2. Bending/stooping/sweeping Constantly No   3. Walking/standing Constantly  Yes     Treatment   Total Treatment time (time-based codes) separate from Evaluation: 8 minutes    therapeutic exercises to develop strength, endurance, ROM, flexibility, posture, and core stabilization for 8 minutes:  Patient education on nature of current condition and PHYSICAL THERAPY POC  Written HOME EXERCISE PROGRAM provided, reviewed, patient demo understanding   LTRs: 10x Bilateral   Bridges: 10x double leg   Seated lumbar flexion: 3x  SKTC: 3x10'' holds Bilateral     Home Exercises and Patient Education Provided  Education provided:   - yes    Home Exercises Provided: yes.  Exercises were reviewed and Talya was able to demonstrate them prior to the end of the session.  Talya demonstrated good  understanding of the education provided.     Assessment     Talya is a 58 y.o. referred to outpatient Physical Therapy with a medical diagnosis of Muscle strain of chest wall and Thoracic myofascial strain after injuring her mid-low back trying to move a heavy table that she does not do frequently. She displays decreased BLE and trunk strength, decreased lumbar mobility most notable lumbar flexion, and difficulty with pain with prolonged standing/walking/squatting/lifting/pulling activities related to work. No current signs of radicular pains currently (subjective revealed some acute radicular pain with flank/chest pain initially), consistent with radiating pain via right lumbo-thoracic paraspinal muscle strain at this time.     Pt presents with decreased  ROM, muscle strength, flexibility, joint mobility. Increased pain, stiffness, soft tissue restriction. Impaired posture, joint mechanics, balance, gait pattern.     The patient's current job specific task deficits include the following: bending, stooping, walking/standing, lifting 50-60# at a time, sweeping/mopping/vacuuming     Patient prognosis: Good.   Rehab potential: good    Patient will benefit from skilled outpatient Physical Therapy to address the deficits stated above and in the chart below, provide patient /family education, to maximize patient's level of independence, and to address work-related functional deficits for their job as a  for [employer].    Plan of care discussed with patient: Yes  Patient's spiritual, cultural and educational needs considered and patient is agreeable to the plan of care and goals as stated below:     Anticipated Barriers for therapy: chronicity of current injury    Medical Necessity is demonstrated by the following  History  Co-morbidities and personal factors that may impact the plan of care [x] LOW: no personal factors / co-morbidities  [] MODERATE: 1-2 personal factors / co-morbidities  [] HIGH: 3+ personal factors / co-morbidities    Moderate / High Support Documentation:   Co-morbidities affecting plan of care: none    Personal Factors:   no deficits     Examination  Body Structures and Functions, activity limitations and participation restrictions that may impact the plan of care [x] LOW: addressing 1-2 elements  [] MODERATE: 3+ elements  [] HIGH: 4+ elements (please support below)    Moderate / High Support Documentation: N/A     Clinical Presentation [x] LOW: stable  [] MODERATE: Evolving  [] HIGH: Unstable     Decision Making/ Complexity Score: low       Goals:   Short Terms Goals: 4 weeks    Goal  Progress  Date    (1)  Patient will be I with HOME EXERCISE PROGRAM  Initial, Not Met    (2)  Increase lumbar ROM to 100% of WNL in order to improve  functional mobility.    Initial, Not Met      (3)  Pt will demo good sitting/standing posture and body mechanics for improved spine health and decreased risk of future injury.  Initial, Not Met        Long Term Goals: 8 weeks    Goal  Progress  Date    (1)  Report decreased low back pain without radiculopathy to </= 1/10 to increase tolerance for ADLs and increased QoL. Initial, Not Met     (2)  Increase strength to >/= 4/5 MMT grade for core and BLE to increase tolerance for ADL and work activities.  No pain with trunk MMT.  Initial, Not Met     (3)   Patient will lift, push/pull   Initial, Not Met        Plan     Plan of care Certification: 12/4/2023 to 2/2/24.    Outpatient Physical Therapy 2 times weekly for 8 weeks o include the following interventions: Cervical/Lumbar Traction, Electrical Stimulation re-eval, dry needling, Gait Training, Iontophoresis (with ), Manual Therapy, Moist Heat/ Ice, Neuromuscular Re-ed, Patient Education, Self Care, Therapeutic Activities, and Therapeutic Exercise.     Upon discharge from further skilled PT intervention it will determined if the need for a work conditioning program or Functional Capacity Evaluation is required to allow the injured worker to return to work with full potential achieved, continued improvement with body mechanics with advanced functional activities, and further minimize future work-related injuries.     Physical therapist and physical therapy assistant(s) will met face to face to discuss patient's treatment plan and progress towards established goals. Pt will be seen by a physical therapist minimally every 6th visit or every 30 days.    Imer Stevens, PT  12/6/2023       I CERTIFY THE NEED FOR THESE SERVICES FURNISHED UNDER THIS PLAN OF TREATMENT AND WHILE UNDER MY CARE     Physician's comments:           Physician's Signature: ___________________________________________________

## 2023-12-06 ENCOUNTER — CLINICAL SUPPORT (OUTPATIENT)
Dept: REHABILITATION | Facility: HOSPITAL | Age: 58
End: 2023-12-06
Payer: COMMERCIAL

## 2023-12-06 DIAGNOSIS — R53.1 DECREASED STRENGTH: ICD-10-CM

## 2023-12-06 DIAGNOSIS — M53.86 DECREASED ROM OF INTERVERTEBRAL DISCS OF LUMBAR SPINE: Primary | ICD-10-CM

## 2023-12-06 PROCEDURE — 97110 THERAPEUTIC EXERCISES: CPT | Mod: PN,CQ

## 2023-12-06 NOTE — PROGRESS NOTES
"[facility]   Physical Therapy Treatment Note      Name: Talya Bates  Clinic Number: [id number]    Therapy Diagnosis:   Encounter Diagnoses   Name Primary?    Decreased ROM of intervertebral discs of lumbar spine Yes    Decreased strength      Physician: Colleen Min MD    Visit Date: 12/6/2023  Physician Orders: PT Eval and Treat  Medical Diagnosis from Referral:   Z02.6 (ICD-10-CM) - Encounter related to worker's compensation claim   S29.011D (ICD-10-CM) - Muscle strain of chest wall, subsequent encounter   S29.019D (ICD-10-CM) - Thoracic myofascial strain, subsequent encounter      Evaluation Date: 12/4/2023  Authorization Period Expiration: 11/9/24  Plan of Care Expiration: 2/2/24  Visit # / Visits authorized: 2/ 18     Foto  Date  Score    #1/3 12/4/23     #2/3       #3/3          Time In: 1:00 PM  Time Out: 2:00 PM  Total Appointment Time (timed & untimed codes): 53 minutes (4TE)     Precautions: Standard, Lupus, Kidney disease    Subjective     Patient reports: agreeable to PT session. " I still have pain in the right side of my back, its just sore". .  She was compliant with home exercise program.  Response to previous treatment: evaluation previous session   Functional change: none reported     Pain: 6/10  Location: right lumbar spine    Objective      Objective Measures updated at progress report unless specified.     Treatment     Talya received the treatments listed below:      therapeutic exercises to develop strength, endurance, ROM, flexibility, posture, and core stabilization for 53 minutes:    LTRs: 10x2 5" hold Bilateral   Bridges: 10x2 double leg   Seated lumbar flexion: 2x10 with Blue peanut physioball  SKTC: 3x10'' holds Bilateral   Transverse Abdominal Bracing 5" x 2 minutes  Transverse Abdominal Bracing w/ marching 5" x 2 minutes  Posterior Pelvic Tilt 5"x15    neuromuscular re-education activities to improve: Balance, Coordination, and Posture for 00 " minutes. The following activities were included:  -  therapeutic activities to improve functional performance for 00  minutes, including:  -  Patient Education and Home Exercises       Education provided:   - encouraged HEP compliance    Written Home Exercises Provided: Patient instructed to cont prior HEP. Exercises were reviewed and Talya was able to demonstrate them prior to the end of the session.  Talya demonstrated good  understanding of the education provided. See Electronic Medical Record under Patient Instructions for exercises provided during therapy sessions    Assessment       Talya is a 58 y.o. referred to outpatient Physical Therapy with a medical diagnosis of Muscle strain of chest wall and Thoracic myofascial strain after injuring her mid-low back trying to move a heavy table that she does not do frequently. She displays decreased BLE and trunk strength, decreased lumbar mobility most notable lumbar flexion, and difficulty with pain with prolonged standing,Walking, squatting, lifting/pulling activities related to work.   Pt completed today's session without provocation of R lumbar pain. Rest breaks provided as needed between supine LE reps for general fatigue within appropriate parameters.     Talya Is progressing well towards her goals.   Patient prognosis is Good.     Patient will continue to benefit from skilled outpatient physical therapy to address the deficits listed in the problem list box on initial evaluation, provide pt/family education and to maximize pt's level of independence in the home and community environment.     Patient's spiritual, cultural and educational needs considered and pt agreeable to plan of care and goals.     Anticipated barriers to physical therapy: chronicity of current injury  Goals:   Short Terms Goals: 4 weeks     Goal  Progress  Date    (1)  Patient will be I with HOME EXERCISE PROGRAM  Initial, Not Met     (2)  Increase lumbar ROM to 100% of WNL in order to  improve functional mobility.    Initial, Not Met      (3)  Pt will demo good sitting/standing posture and body mechanics for improved spine health and decreased risk of future injury.  Initial, Not Met         Long Term Goals: 8 weeks     Goal  Progress  Date    (1)  Report decreased low back pain without radiculopathy to </= 1/10 to increase tolerance for ADLs and increased QoL. Initial, Not Met      (2)  Increase strength to >/= 4/5 MMT grade for core and BLE to increase tolerance for ADL and work activities.  No pain with trunk MMT.  Initial, Not Met     (3)   Patient will lift, push/pull   Initial, Not Met           Plan     Cont to advance PT as per POC, monitor response to session     Allan Mckeon, PTA

## 2023-12-13 ENCOUNTER — CLINICAL SUPPORT (OUTPATIENT)
Dept: REHABILITATION | Facility: HOSPITAL | Age: 58
End: 2023-12-13
Payer: COMMERCIAL

## 2023-12-13 DIAGNOSIS — M53.86 DECREASED ROM OF INTERVERTEBRAL DISCS OF LUMBAR SPINE: Primary | ICD-10-CM

## 2023-12-13 DIAGNOSIS — R53.1 DECREASED STRENGTH: ICD-10-CM

## 2023-12-13 PROCEDURE — 97110 THERAPEUTIC EXERCISES: CPT | Mod: PN,CQ

## 2023-12-13 PROCEDURE — 97530 THERAPEUTIC ACTIVITIES: CPT | Mod: PN,CQ

## 2023-12-13 NOTE — PROGRESS NOTES
"[facility]   Physical Therapy Treatment Note      Name: Talya Bates  Clinic Number: [id number]    Therapy Diagnosis:   Encounter Diagnoses   Name Primary?    Decreased ROM of intervertebral discs of lumbar spine Yes    Decreased strength        Physician: Colleen Min MD    Visit Date: 12/13/2023  Physician Orders: PT Eval and Treat  Medical Diagnosis from Referral:   Z02.6 (ICD-10-CM) - Encounter related to worker's compensation claim   S29.011D (ICD-10-CM) - Muscle strain of chest wall, subsequent encounter   S29.019D (ICD-10-CM) - Thoracic myofascial strain, subsequent encounter      Evaluation Date: 12/4/2023  Authorization Period Expiration: 11/9/24  Plan of Care Expiration: 2/2/24  Visit # / Visits authorized: 3/ 18     Foto  Date  Score    #1/3 12/4/23     #2/3       #3/3          Time In: 2:00 PM  Time Out: 2:53 PM  Total Appointment Time (timed & untimed codes): 53 minutes (2 TA, 2TE)     Precautions: Standard, Lupus, Kidney disease    Subjective     Patient reports: agreeable to PT session. " I was sick and missed my last PT appointment"  She was compliant with home exercise program.  Response to previous treatment: evaluation previous session   Functional change: none reported     Pain: 0/10  Location: right lumbar spine    Objective      Objective Measures updated at progress report unless specified.     Treatment     Talya received the treatments listed below:      therapeutic exercises to develop strength, endurance, ROM, flexibility, posture, and core stabilization for 30 minutes:    LTRs: 10x2 5" hold Bilateral   Bridges: 10x2 double leg   Seated lumbar flexion: 2x10 with Blue peanut physioball  SKTC: 3x10'' holds Bilateral   Transverse Abdominal Bracing 5" x 2 minutes  Transverse Abdominal Bracing w/ marching 5" x 2 minutes  Posterior Pelvic Tilt 5"x15    neuromuscular re-education activities to improve: Balance, Coordination, and Posture for 00 minutes. The " following activities were included:  -  therapeutic activities to improve functional performance for 23  minutes, including:  Nustep x 10 min fro B UE/LE reciprocal AROM level 2  SAPD w/ RTB 2x15  Paloff press RTB 2x10  Horizontal Abduction YTB 2x10   -  Patient Education and Home Exercises       Education provided:   - encouraged HEP compliance    Written Home Exercises Provided: Patient instructed to cont prior HEP. Exercises were reviewed and Talya was able to demonstrate them prior to the end of the session.  Talya demonstrated good  understanding of the education provided. See Electronic Medical Record under Patient Instructions for exercises provided during therapy sessions    Assessment       Talya is a 58 y.o. referred to outpatient Physical Therapy with a medical diagnosis of Muscle strain of chest wall and Thoracic myofascial strain after injuring her mid-low back trying to move a heavy table that she does not do frequently. She displays decreased BLE and trunk strength, decreased lumbar mobility most notable lumbar flexion, and difficulty with pain with prolonged standing,Walking, squatting, lifting/pulling activities related to work.   Pt able to complete today's session with additional therex involving use of resisted theraband for core stability and added nustep for cardiopulmonary exercise.     Talya Is progressing well towards her goals.   Patient prognosis is Good.     Patient will continue to benefit from skilled outpatient physical therapy to address the deficits listed in the problem list box on initial evaluation, provide pt/family education and to maximize pt's level of independence in the home and community environment.     Patient's spiritual, cultural and educational needs considered and pt agreeable to plan of care and goals.     Anticipated barriers to physical therapy: chronicity of current injury  Goals:   Short Terms Goals: 4 weeks     Goal  Progress  Date    (1)  Patient will be I  with HOME EXERCISE PROGRAM  Initial, Not Met     (2)  Increase lumbar ROM to 100% of WNL in order to improve functional mobility.    Initial, Not Met      (3)  Pt will demo good sitting/standing posture and body mechanics for improved spine health and decreased risk of future injury.  Initial, Not Met         Long Term Goals: 8 weeks     Goal  Progress  Date    (1)  Report decreased low back pain without radiculopathy to </= 1/10 to increase tolerance for ADLs and increased QoL. Initial, Not Met      (2)  Increase strength to >/= 4/5 MMT grade for core and BLE to increase tolerance for ADL and work activities.  No pain with trunk MMT.  Initial, Not Met     (3)   Patient will lift, push/pull   Initial, Not Met           Plan     Cont to advance PT as per POC, monitor response to session     Allan Mckeon PTA

## 2023-12-18 ENCOUNTER — CLINICAL SUPPORT (OUTPATIENT)
Dept: REHABILITATION | Facility: HOSPITAL | Age: 58
End: 2023-12-18
Payer: COMMERCIAL

## 2023-12-18 DIAGNOSIS — R53.1 DECREASED STRENGTH: ICD-10-CM

## 2023-12-18 DIAGNOSIS — M53.86 DECREASED ROM OF INTERVERTEBRAL DISCS OF LUMBAR SPINE: Primary | ICD-10-CM

## 2023-12-18 PROCEDURE — 97110 THERAPEUTIC EXERCISES: CPT | Mod: PN,CQ

## 2023-12-18 PROCEDURE — 97530 THERAPEUTIC ACTIVITIES: CPT | Mod: PN,CQ

## 2023-12-18 NOTE — PROGRESS NOTES
"[facility]   Physical Therapy Treatment Note      Name: Talya Bates  Clinic Number: [id number]    Therapy Diagnosis:   Encounter Diagnoses   Name Primary?    Decreased ROM of intervertebral discs of lumbar spine Yes    Decreased strength      Physician: Colleen Min MD    Visit Date: 12/18/2023  Physician Orders: PT Eval and Treat  Medical Diagnosis from Referral:   Z02.6 (ICD-10-CM) - Encounter related to worker's compensation claim   S29.011D (ICD-10-CM) - Muscle strain of chest wall, subsequent encounter   S29.019D (ICD-10-CM) - Thoracic myofascial strain, subsequent encounter      Evaluation Date: 12/4/2023  Authorization Period Expiration: 11/9/24  Plan of Care Expiration: 2/2/24  Visit # / Visits authorized: 4/ 18     Foto  Date  Score    #1/3 12/4/23     #2/3       #3/3          Time In: 1:30 PM  Time Out: 2:30 PM  Total Appointment Time (timed & untimed codes): 55 minutes (2 TA, 2TE)     Precautions: Standard, Lupus, Kidney disease    Subjective     Patient reports: agreeable to PT session. " I don't think the pain is worse"  She was compliant with home exercise program.  Response to previous treatment: no adverse response   Functional change: none reported     Pain: 0/10  Location: right lumbar spine    Objective      Objective Measures updated at progress report unless specified.     Treatment     Talya received the treatments listed below:      therapeutic exercises to develop strength, endurance, ROM, flexibility, posture, and core stabilization for 30 minutes:    LTRs: 10x2 5" hold Bilateral   Bridges: 10x2 double leg   Seated lumbar flexion: 2x10 with Blue peanut physioball  SKTC: 3x10'' holds Bilateral   Transverse Abdominal Bracing 5" x 2 minutes  Transverse Abdominal Bracing w/ marching 5" x 2 minutes  Posterior Pelvic Tilt 5"x15    neuromuscular re-education activities to improve: Balance, Coordination, and Posture for 00 minutes. The following activities " were included:  -  therapeutic activities to improve functional performance for 25  minutes, including:  - Nustep x 10 min fro B UE/LE reciprocal AROM level 2  SAPD w/ RTB 2x15 (vc's on postural awareness)  Paloff press RTB 2x10  Horizontal Abduction YTB 2x10  (vc's on technique)  -  Patient Education and Home Exercises       Education provided:   - encouraged HEP compliance    Written Home Exercises Provided: Patient instructed to cont prior HEP. Exercises were reviewed and Talya was able to demonstrate them prior to the end of the session.  Talya demonstrated good  understanding of the education provided. See Electronic Medical Record under Patient Instructions for exercises provided during therapy sessions    Assessment     Talya is a 58 y.o. referred to outpatient Physical Therapy with a medical diagnosis of Muscle strain of chest wall and Thoracic myofascial strain after injuring her mid-low back trying to move a heavy table that she does not do frequently.    Pt completed today's session with verbal instructions provided on postural awareness in standing with use of resisted theraband.      Talya Is progressing well towards her goals.   Patient prognosis is Good.     Patient will continue to benefit from skilled outpatient physical therapy to address the deficits listed in the problem list box on initial evaluation, provide pt/family education and to maximize pt's level of independence in the home and community environment.     Patient's spiritual, cultural and educational needs considered and pt agreeable to plan of care and goals.     Anticipated barriers to physical therapy: chronicity of current injury  Goals:   Short Terms Goals: 4 weeks     Goal  Progress  Date    (1)  Patient will be I with HOME EXERCISE PROGRAM  Initial, Not Met     (2)  Increase lumbar ROM to 100% of WNL in order to improve functional mobility.    Initial, Not Met      (3)  Pt will demo good sitting/standing posture and body  mechanics for improved spine health and decreased risk of future injury.  Initial, Not Met         Long Term Goals: 8 weeks     Goal  Progress  Date    (1)  Report decreased low back pain without radiculopathy to </= 1/10 to increase tolerance for ADLs and increased QoL. Initial, Not Met      (2)  Increase strength to >/= 4/5 MMT grade for core and BLE to increase tolerance for ADL and work activities.  No pain with trunk MMT.  Initial, Not Met     (3)   Patient will lift, push/pull   Initial, Not Met           Plan     Cont to advance PT as per POC    Allan Mckeon, PTA

## 2023-12-20 ENCOUNTER — CLINICAL SUPPORT (OUTPATIENT)
Dept: REHABILITATION | Facility: HOSPITAL | Age: 58
End: 2023-12-20
Payer: COMMERCIAL

## 2023-12-20 DIAGNOSIS — R53.1 DECREASED STRENGTH: ICD-10-CM

## 2023-12-20 DIAGNOSIS — M53.86 DECREASED ROM OF INTERVERTEBRAL DISCS OF LUMBAR SPINE: Primary | ICD-10-CM

## 2023-12-20 PROCEDURE — 97110 THERAPEUTIC EXERCISES: CPT | Mod: PN,CQ

## 2023-12-20 PROCEDURE — 97530 THERAPEUTIC ACTIVITIES: CPT | Mod: PN,CQ

## 2023-12-20 NOTE — PROGRESS NOTES
"[facility]   Physical Therapy Treatment Note      Name: Talya Bates  Clinic Number: [id number]    Therapy Diagnosis:   Encounter Diagnoses   Name Primary?    Decreased ROM of intervertebral discs of lumbar spine Yes    Decreased strength      Physician: Colleen Min MD    Visit Date: 12/20/2023  Physician Orders: PT Eval and Treat  Medical Diagnosis from Referral:   Z02.6 (ICD-10-CM) - Encounter related to worker's compensation claim   S29.011D (ICD-10-CM) - Muscle strain of chest wall, subsequent encounter   S29.019D (ICD-10-CM) - Thoracic myofascial strain, subsequent encounter      Evaluation Date: 12/4/2023  Authorization Period Expiration: 11/9/24  Plan of Care Expiration: 2/2/24  Visit # / Visits authorized: 5/ 18     Foto  Date  Score    #1/3 12/4/23     #2/3       #3/3          Time In: 1:00 PM  Time Out: 2:00 PM  Total Appointment Time (timed & untimed codes): 55 minutes (2 TA, 2TE)     Precautions: Standard, Lupus, Kidney disease    Subjective     Patient reports: agreeable to PT session.  She was compliant with home exercise program.  Response to previous treatment: no adverse response   Functional change: none reported     Pain: 0/10  Location: right lumbar spine    Objective      Objective Measures updated at progress report unless specified.     Treatment     Talya received the treatments listed below:      therapeutic exercises to develop strength, endurance, ROM, flexibility, posture, and core stabilization for 30 minutes:    LTRs: 10x2 5" hold Bilateral   Bridges: 10x2 double leg   Seated lumbar flexion: 2x10 with Blue peanut physioball  SKTC: 3x10'' holds Bilateral   Transverse Abdominal Bracing 5" x 2 minutes  Transverse Abdominal Bracing w/ marching 5" x 2 minutes  Posterior Pelvic Tilt 5"x15    neuromuscular re-education activities to improve: Balance, Coordination, and Posture for 00 minutes. The following activities were included:  -  therapeutic " activities to improve functional performance for 25  minutes, including:  - Nustep x 10 min fro B UE/LE reciprocal AROM level 2  SAPD w/ RTB 2x15 (vc's on postural awareness)  Paloff press RTB 2x10  Horizontal Abduction YTB 2x10  (vc's on technique)  -  Patient Education and Home Exercises       Education provided:   - encouraged HEP compliance    Written Home Exercises Provided: Patient instructed to cont prior HEP. Exercises were reviewed and Talya was able to demonstrate them prior to the end of the session.  Talya demonstrated good  understanding of the education provided. See Electronic Medical Record under Patient Instructions for exercises provided during therapy sessions    Assessment     Talya is a 58 y.o. referred to outpatient Physical Therapy with a medical diagnosis of Muscle strain of chest wall and Thoracic myofascial strain after injuring her mid-low back trying to move a heavy table that she does not do frequently.    Pt completed today's session with verbal instructions provided on postural awareness in standing with use of resisted theraband.  Education provided on back safety with standing activities and proper postural awareness in sitting and standing to prevent pain symptoms. Will progress as tolerated with UE and stability therex.    Talya Is progressing well towards her goals.   Patient prognosis is Good.     Patient will continue to benefit from skilled outpatient physical therapy to address the deficits listed in the problem list box on initial evaluation, provide pt/family education and to maximize pt's level of independence in the home and community environment.    Patient's spiritual, cultural and educational needs considered and pt agreeable to plan of care and goals.     Anticipated barriers to physical therapy: chronicity of current injury  Goals:   Short Terms Goals: 4 weeks     Goal  Progress  Date    (1)  Patient will be I with HOME EXERCISE PROGRAM  Initial, Not Met     (2)   Increase lumbar ROM to 100% of WNL in order to improve functional mobility.    Initial, Not Met      (3)  Pt will demo good sitting/standing posture and body mechanics for improved spine health and decreased risk of future injury.  Initial, Not Met         Long Term Goals: 8 weeks     Goal  Progress  Date    (1)  Report decreased low back pain without radiculopathy to </= 1/10 to increase tolerance for ADLs and increased QoL. Initial, Not Met      (2)  Increase strength to >/= 4/5 MMT grade for core and BLE to increase tolerance for ADL and work activities.  No pain with trunk MMT.  Initial, Not Met     (3)   Patient will lift, push/pull   Initial, Not Met           Plan     Cont to advance PT as per POC    Allan Mckeon, ALLYN

## 2023-12-27 ENCOUNTER — CLINICAL SUPPORT (OUTPATIENT)
Dept: REHABILITATION | Facility: HOSPITAL | Age: 58
End: 2023-12-27
Payer: COMMERCIAL

## 2023-12-27 ENCOUNTER — OFFICE VISIT (OUTPATIENT)
Dept: URGENT CARE | Facility: CLINIC | Age: 58
End: 2023-12-27
Payer: COMMERCIAL

## 2023-12-27 VITALS
HEIGHT: 68 IN | WEIGHT: 230 LBS | RESPIRATION RATE: 18 BRPM | OXYGEN SATURATION: 98 % | DIASTOLIC BLOOD PRESSURE: 79 MMHG | HEART RATE: 76 BPM | TEMPERATURE: 98 F | BODY MASS INDEX: 34.86 KG/M2 | SYSTOLIC BLOOD PRESSURE: 140 MMHG

## 2023-12-27 DIAGNOSIS — Z02.6 ENCOUNTER RELATED TO WORKER'S COMPENSATION CLAIM: ICD-10-CM

## 2023-12-27 DIAGNOSIS — M53.86 DECREASED ROM OF INTERVERTEBRAL DISCS OF LUMBAR SPINE: Primary | ICD-10-CM

## 2023-12-27 DIAGNOSIS — R53.1 DECREASED STRENGTH: ICD-10-CM

## 2023-12-27 DIAGNOSIS — S29.011D MUSCLE STRAIN OF CHEST WALL, SUBSEQUENT ENCOUNTER: Primary | ICD-10-CM

## 2023-12-27 DIAGNOSIS — S29.019D THORACIC MYOFASCIAL STRAIN, SUBSEQUENT ENCOUNTER: ICD-10-CM

## 2023-12-27 PROCEDURE — 97530 THERAPEUTIC ACTIVITIES: CPT | Mod: PN,CQ

## 2023-12-27 PROCEDURE — 97110 THERAPEUTIC EXERCISES: CPT | Mod: PN,CQ

## 2023-12-27 PROCEDURE — 99213 OFFICE O/P EST LOW 20 MIN: CPT | Mod: S$GLB,,, | Performed by: SURGERY

## 2023-12-27 PROCEDURE — 99213 PR OFFICE/OUTPT VISIT, EST, LEVL III, 20-29 MIN: ICD-10-PCS | Mod: S$GLB,,, | Performed by: SURGERY

## 2023-12-27 RX ORDER — AMLODIPINE BESYLATE 5 MG/1
5 TABLET ORAL
COMMUNITY

## 2023-12-27 RX ORDER — CYCLOSPORINE 25 MG/1
75 CAPSULE, LIQUID FILLED ORAL EVERY 12 HOURS
COMMUNITY
Start: 2023-12-20

## 2023-12-27 RX ORDER — CARVEDILOL 12.5 MG/1
12.5 TABLET ORAL 2 TIMES DAILY
COMMUNITY
Start: 2023-12-20

## 2023-12-27 RX ORDER — ASPIRIN 81 MG/1
81 TABLET ORAL
COMMUNITY

## 2023-12-27 RX ORDER — ATORVASTATIN CALCIUM 20 MG/1
20 TABLET, FILM COATED ORAL
COMMUNITY

## 2023-12-27 RX ORDER — ERGOCALCIFEROL 1.25 MG/1
50000 CAPSULE ORAL
COMMUNITY
Start: 2023-01-15

## 2023-12-27 NOTE — PROGRESS NOTES
Subjective:      Patient ID: Talya Bates is a 58 y.o. female.    Chief Complaint: Shoulder Injury and Back Pain (Lower to upper)    Patient's place of employment - [employer]  Patient's job title -   Date of Injury - 10/23/23  Body part injured - back and shoulder  Current work status per last visit - regular  Improved, same, or worse - improved  Pain Scale right now (1-10) -  0    Shoulder Injury   Pertinent negatives include no numbness.   Back Pain  Pertinent negatives include no numbness.       Musculoskeletal:  Positive for joint pain, abnormal ROM of joint, back pain and muscle ache. Negative for pain, trauma, joint swelling and muscle cramps.   Skin:  Negative for erythema and bruising.   Neurological:  Negative for numbness and tingling.       See MA note above. Begin MD note:    Talya Bates is a 58 y.o. presenting for follow-up of back pain. She notes improvements with PT. She is only using Tylenol prn, finished the muscle relaxer and doesn't feel she needs more. Continues to work regular duty, not pushing to do more than she is able.       Objective:     Physical Exam  Vitals and nursing note reviewed.   Constitutional:       General: She is not in acute distress.     Appearance: She is not ill-appearing.   HENT:      Head: Normocephalic.   Eyes:      Conjunctiva/sclera: Conjunctivae normal.   Pulmonary:      Effort: No respiratory distress.   Musculoskeletal:      Thoracic back: Tenderness present.     Comments: TTP right lateral thoracic region immediately posterior to midaxillary line   Skin:     General: Skin is warm and dry.      Findings: No erythema.   Neurological:      Mental Status: She is alert and oriented to person, place, and time.   Psychiatric:         Attention and Perception: Attention normal.         Mood and Affect: Mood normal.         Behavior: Behavior normal     Assessment:      1. Muscle strain of chest wall, subsequent encounter    2. Encounter  related to worker's compensation claim    3. Thoracic myofascial strain, subsequent encounter      Plan:     I reviewed the PT notes related to this injury, she is making good progress toward her treatment goals. I anticipate she will only require 1 course of PT. Continue regular duty as able. Follow-up prior to PT completion.     Diagnoses and plan discussed with the patient, as well as the expected course and duration of symptoms. Risks and benefits of any medication prescribed during this visit was explained, verbal instructions on use given. Clinic/Emergency department return precautions were given, can return to [facility] before scheduled follow-up appointment if notes worsening/aggravation of symptoms. All questions and concerns were addressed prior to discharge. Plan was developed with active input from the patient and they verbalized understanding of and agreement with the POC.   Note was dictated with voice recognition software, please excuse any grammatical errors.    I spent a total of 25 minutes on the day of the visit.  This includes face to face time and non-face to face time preparing to see the patient (eg, review of tests), obtaining and/or reviewing separately obtained history, documenting clinical information in the electronic or other health record, independently interpreting results and communicating results to the patient/family/caregiver, or care coordinator.       Patient Instructions: Continue Physical Therapy   Restrictions: Regular Duty  Follow up in about 1 month (around 1/30/2024).

## 2023-12-27 NOTE — LETTER
[facility]  [address] [address]  Phone: [phone]  Fax: [phone]  [facility] [facility]: [email or web address]    Pt Name: Talya Bates  Injury Date: 10/23/2023   Employee ID: [id number] Date of Treatment: 12/27/2023   Company: [employer]      Appointment Time: 09:30 AM Arrived: 9:30 AM    Provider: Colleen Min MD Time Out:10:02 AM     Office Treatment:   1. Muscle strain of chest wall, subsequent encounter    2. Encounter related to worker's compensation claim    3. Thoracic myofascial strain, subsequent encounter          Patient Instructions: Continue Physical Therapy      Restrictions: Regular Duty     Return Appointment: 1/30/2024 at 9:30 AM JUAN MANUEL

## 2023-12-27 NOTE — PROGRESS NOTES
"[facility]   Physical Therapy Treatment Note      Name: Talya Bates  Clinic Number: [id number]    Therapy Diagnosis:   Encounter Diagnoses   Name Primary?    Decreased ROM of intervertebral discs of lumbar spine Yes    Decreased strength      Physician: Colleen Min MD    Visit Date: 12/27/2023  Physician Orders: PT Eval and Treat  Medical Diagnosis from Referral:   Z02.6 (ICD-10-CM) - Encounter related to worker's compensation claim   S29.011D (ICD-10-CM) - Muscle strain of chest wall, subsequent encounter   S29.019D (ICD-10-CM) - Thoracic myofascial strain, subsequent encounter      Evaluation Date: 12/4/2023  Authorization Period Expiration: 11/9/24  Plan of Care Expiration: 2/2/24  Visit # / Visits authorized: 6/ 18     Foto  Date  Score    #1/3 12/4/23     #2/3  12/27/23     #3/3          Time In: 3:00 PM  Time Out: 4:00 PM  Total Appointment Time (timed & untimed codes): 55 minutes (2 TA, 2TE)     Precautions: Standard, Lupus, Kidney disease    Subjective     Patient reports: agreeable to PT session.  She was compliant with home exercise program.  Response to previous treatment: no adverse response   Functional change: none reported     Pain: 0/10  Location: right lumbar spine    Objective      Objective Measures updated at progress report unless specified.     Treatment     Talya received the treatments listed below:      therapeutic exercises to develop strength, endurance, ROM, flexibility, posture, and core stabilization for 30 minutes:    LTRs: 10x2 5" hold Bilateral   Bridges: 10x2 double leg   Seated lumbar flexion: 2x10 with Blue peanut physioball  SKTC: 3x10'' holds Bilateral   Transverse Abdominal Bracing 5" x 2 minutes  Transverse Abdominal Bracing w/ marching 5" x 2 minutes  Posterior Pelvic Tilt 5"x15    neuromuscular re-education activities to improve: Balance, Coordination, and Posture for 00 minutes. The following activities were " included:  -  therapeutic activities to improve functional performance for 25  minutes, including:  - Nustep x 10 min fro B UE/LE reciprocal AROM level 2  SAPD w/ RTB 2x15 (vc's on postural awareness)  Paloff press RTB 2x10  Horizontal Abduction YTB 2x10  (vc's on technique)  -  Patient Education and Home Exercises       Education provided:   - encouraged HEP compliance    Written Home Exercises Provided: Patient instructed to cont prior HEP. Exercises were reviewed and Talya was able to demonstrate them prior to the end of the session.  Talya demonstrated good  understanding of the education provided. See Electronic Medical Record under Patient Instructions for exercises provided during therapy sessions    Assessment     Talya is a 58 y.o. referred to outpatient Physical Therapy with a medical diagnosis of Muscle strain of chest wall and Thoracic myofascial strain after injuring her mid-low back trying to move a heavy table that she does not do frequently.    Pt able to complete today's session with no adverse response. Continued encouraging postural awareness in standing / sitting    Talya Is progressing well towards her goals.   Patient prognosis is Good.     Patient will continue to benefit from skilled outpatient physical therapy to address the deficits listed in the problem list box on initial evaluation, provide pt/family education and to maximize pt's level of independence in the home and community environment.    Patient's spiritual, cultural and educational needs considered and pt agreeable to plan of care and goals.     Anticipated barriers to physical therapy: chronicity of current injury  Goals:   Short Terms Goals: 4 weeks     Goal  Progress  Date    (1)  Patient will be I with HOME EXERCISE PROGRAM  Initial, Not Met     (2)  Increase lumbar ROM to 100% of WNL in order to improve functional mobility.    Initial, Not Met      (3)  Pt will demo good sitting/standing posture and body mechanics for  improved spine health and decreased risk of future injury.  Initial, Not Met         Long Term Goals: 8 weeks     Goal  Progress  Date    (1)  Report decreased low back pain without radiculopathy to </= 1/10 to increase tolerance for ADLs and increased QoL. Initial, Not Met      (2)  Increase strength to >/= 4/5 MMT grade for core and BLE to increase tolerance for ADL and work activities.  No pain with trunk MMT.  Initial, Not Met     (3)   Patient will lift, push/pull   Initial, Not Met           Plan     Cont to advance PT as per POC    Allan Mckeon, PTA

## 2023-12-28 ENCOUNTER — CLINICAL SUPPORT (OUTPATIENT)
Dept: REHABILITATION | Facility: HOSPITAL | Age: 58
End: 2023-12-28
Payer: COMMERCIAL

## 2023-12-28 DIAGNOSIS — M53.86 DECREASED ROM OF INTERVERTEBRAL DISCS OF LUMBAR SPINE: Primary | ICD-10-CM

## 2023-12-28 DIAGNOSIS — R53.1 DECREASED STRENGTH: ICD-10-CM

## 2023-12-28 PROCEDURE — 97110 THERAPEUTIC EXERCISES: CPT | Mod: PN

## 2023-12-28 PROCEDURE — 97530 THERAPEUTIC ACTIVITIES: CPT | Mod: PN

## 2023-12-28 PROCEDURE — 97112 NEUROMUSCULAR REEDUCATION: CPT | Mod: PN

## 2023-12-28 NOTE — PROGRESS NOTES
"[facility]   Physical Therapy Treatment Note      Name: Talya Bates  Clinic Number: [id number]    Therapy Diagnosis:   Encounter Diagnoses   Name Primary?    Decreased ROM of intervertebral discs of lumbar spine Yes    Decreased strength      Physician: Colleen Min MD    Visit Date: 12/28/2023  Physician Orders: PT Eval and Treat  Medical Diagnosis from Referral:   Z02.6 (ICD-10-CM) - Encounter related to worker's compensation claim   S29.011D (ICD-10-CM) - Muscle strain of chest wall, subsequent encounter   S29.019D (ICD-10-CM) - Thoracic myofascial strain, subsequent encounter      Evaluation Date: 12/4/2023  Authorization Period Expiration: 11/9/24  Plan of Care Expiration: 2/2/24  Visit # / Visits authorized: 7/18     Foto  Date  Score    #1/3 12/4/23     #2/3  12/27/23     #3/3          Time In: 1:05 PM  Time Out: 2:00 PM  Total Appointment Time (timed & untimed codes): 55 minutes (1TA, 1 NMR, 2TE)     Precautions: Standard, Lupus, Kidney disease    Subjective     Patient reports: doing well. Saw MD yesterday and will finish out therapy before seeing MD again she thinks. 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   She was compliant with home exercise program.  Response to previous treatment: no adverse response   Functional change: none reported     Pain: 0/10  Location: right lumbar spine    Objective      Objective Measures updated at progress report unless specified.     Treatment     Talya received the treatments listed below:      therapeutic exercises to develop strength, endurance, ROM, flexibility, posture, and core stabilization for 30 minutes:    LTRs: 15x5" hold Bilateral   Bridges: 10x3 double leg   Open books: next  Seated lumbar flexion: 2x10 with Blue peanut physioball  Sidelying hip abduction: 2x10 Bilateral   Bike: 6 minutes L2.5    neuromuscular re-education activities to improve: Balance, Coordination, and Posture " for 10 minutes. The following activities were included:  Horizontal Abduction RTB 2x20 for upright posture (vc's on technique)  Transverse Abdominal Bracing w/ straight leg raise 2x10  Wall slides: 15x3'' holds    therapeutic activities to improve functional performance for 15 minutes, including:  Leg press: 3x10 double leg, 5 plates, sled=5  SAPD w/ GTB 2x15 (vc's on postural awareness)  Paloff press double GTB 2x10 in each direction  -  Patient Education and Home Exercises       Education provided:   - encouraged HEP compliance    Written Home Exercises Provided: Patient instructed to cont prior HEP. Exercises were reviewed and Talya was able to demonstrate them prior to the end of the session.  Talya demonstrated good  understanding of the education provided. See Electronic Medical Record under Patient Instructions for exercises provided during therapy sessions    Assessment     Talya is a 58 y.o. referred to outpatient Physical Therapy with a medical diagnosis of Muscle strain of chest wall and Thoracic myofascial strain after injuring her mid-low back trying to move a heavy table that she does not do frequently.    Pt did well, no pain throughout session, and advancing resistance training. Can progress to functional training next visit according to job duties. Pt likely nearing discharge overall.    Talya Is progressing well towards her goals.   Patient prognosis is Good.     Patient will continue to benefit from skilled outpatient physical therapy to address the deficits listed in the problem list box on initial evaluation, provide pt/family education and to maximize pt's level of independence in the home and community environment.    Patient's spiritual, cultural and educational needs considered and pt agreeable to plan of care and goals.     Anticipated barriers to physical therapy: chronicity of current injury  Goals:   Short Terms Goals: 4 weeks     Goal  Progress  Date    (1)  Patient will be I with  HOME EXERCISE PROGRAM  Initial, Not Met     (2)  Increase lumbar ROM to 100% of WNL in order to improve functional mobility.    Initial, Not Met      (3)  Pt will demo good sitting/standing posture and body mechanics for improved spine health and decreased risk of future injury.  Initial, Not Met         Long Term Goals: 8 weeks     Goal  Progress  Date    (1)  Report decreased low back pain without radiculopathy to </= 1/10 to increase tolerance for ADLs and increased QoL. Initial, Not Met      (2)  Increase strength to >/= 4/5 MMT grade for core and BLE to increase tolerance for ADL and work activities.  No pain with trunk MMT.  Initial, Not Met     (3)   Patient will lift, push/pull   Initial, Not Met           Plan     Cont to advance PT as per POC    Imer Stevens, PT

## 2024-01-02 ENCOUNTER — CLINICAL SUPPORT (OUTPATIENT)
Dept: REHABILITATION | Facility: HOSPITAL | Age: 59
End: 2024-01-02
Payer: COMMERCIAL

## 2024-01-02 DIAGNOSIS — M53.86 DECREASED ROM OF INTERVERTEBRAL DISCS OF LUMBAR SPINE: Primary | ICD-10-CM

## 2024-01-02 DIAGNOSIS — R53.1 DECREASED STRENGTH: ICD-10-CM

## 2024-01-02 PROCEDURE — 97110 THERAPEUTIC EXERCISES: CPT | Mod: PN | Performed by: PHYSICAL THERAPIST

## 2024-01-02 PROCEDURE — 97112 NEUROMUSCULAR REEDUCATION: CPT | Mod: PN | Performed by: PHYSICAL THERAPIST

## 2024-01-02 PROCEDURE — 97530 THERAPEUTIC ACTIVITIES: CPT | Mod: PN | Performed by: PHYSICAL THERAPIST

## 2024-01-02 NOTE — PROGRESS NOTES
"[facility]   Physical Therapy Treatment Note      Name: Talya Bates  Clinic Number: [id number]    Therapy Diagnosis:   Encounter Diagnoses   Name Primary?    Decreased ROM of intervertebral discs of lumbar spine Yes    Decreased strength        Physician: Colleen Min MD    Visit Date: 1/2/2024  Physician Orders: PT Eval and Treat  Medical Diagnosis from Referral:   Z02.6 (ICD-10-CM) - Encounter related to worker's compensation claim   S29.011D (ICD-10-CM) - Muscle strain of chest wall, subsequent encounter   S29.019D (ICD-10-CM) - Thoracic myofascial strain, subsequent encounter      Evaluation Date: 12/4/2023  Authorization Period Expiration: 11/9/24  Plan of Care Expiration: 2/2/24  Visit # / Visits authorized: 7/18     Foto  Date  Score    #1/3 12/4/23     #2/3  12/27/23     #3/3          Time In: 3:05 PM  Time Out: 4:00 PM  Total Appointment Time (timed & untimed codes): 55 minutes (2 TA, 1 NMR, 1TE)     Precautions: Standard, Lupus, Kidney disease    Subjective     Patient reports: she hasn't had pain in 3 days due to not working.     She was compliant with home exercise program.  Response to previous treatment: no adverse response   Functional change: none reported     Pain: 0/10  Location: right lumbar spine    Objective      Objective Measures updated at progress report unless specified.     Treatment     Talya received the treatments listed below:      therapeutic exercises to develop strength, endurance, ROM, flexibility, posture, and core stabilization for 15 minutes:    LTRs: 15x5" hold Bilateral   Bridges: 3x10 double leg   Sidelying hip abduction: 2x10 Bilateral   Seated lumbar flexion: 2x10 with Blue peanut physioball - NT  Bike: 6 minutes L2.5    neuromuscular re-education activities to improve: Balance, Coordination, and Posture for 8 minutes. The following activities were included:  Horizontal Abduction RTB 2x20 for upright posture (vc's on " technique)  Transverse Abdominal Bracing w/ straight leg raise 3x10  Wall slides: 15x3'' holds - NT    therapeutic activities to improve functional performance for 33 minutes, including:  Leg press: 3x10 double leg, 6 plates, sled=5  SAPD w/ GTB 2x15 (vc's on postural awareness) - NT  Paloff press double GTB 30x in each direction  Box Lift: 16 lb box 1x10  Box carry: 16 lb box 1 lap (160 ft)  CC push walkouts: 20 pounds (10lb each) 2x10  CC pull walkouts: 20 pounds (10lb each) 2x10  -  Patient Education and Home Exercises       Education provided:   - encouraged HEP compliance    Written Home Exercises Provided: Patient instructed to cont prior HEP. Exercises were reviewed and Talya was able to demonstrate them prior to the end of the session.  Talya demonstrated good  understanding of the education provided. See Electronic Medical Record under Patient Instructions for exercises provided during therapy sessions    Assessment     Talya is a 58 y.o. referred to outpatient Physical Therapy with a medical diagnosis of Muscle strain of chest wall and Thoracic myofascial strain after injuring her mid-low back trying to move a heavy table that she does not do frequently.      Progressed functional training today without inc in pain. Poor squat mechanics with moderate cues to improve depth without lumbar curve reversal or excessive hip hinge.     Talya Is progressing well towards her goals.   Patient prognosis is Good.     Patient will continue to benefit from skilled outpatient physical therapy to address the deficits listed in the problem list box on initial evaluation, provide pt/family education and to maximize pt's level of independence in the home and community environment.    Patient's spiritual, cultural and educational needs considered and pt agreeable to plan of care and goals.     Anticipated barriers to physical therapy: chronicity of current injury  Goals:   Short Terms Goals: 4 weeks     Goal  Progress   Date    (1)  Patient will be I with HOME EXERCISE PROGRAM  Initial, Not Met     (2)  Increase lumbar ROM to 100% of WNL in order to improve functional mobility.    Initial, Not Met      (3)  Pt will demo good sitting/standing posture and body mechanics for improved spine health and decreased risk of future injury.  Initial, Not Met         Long Term Goals: 8 weeks     Goal  Progress  Date    (1)  Report decreased low back pain without radiculopathy to </= 1/10 to increase tolerance for ADLs and increased QoL. Initial, Not Met      (2)  Increase strength to >/= 4/5 MMT grade for core and BLE to increase tolerance for ADL and work activities.  No pain with trunk MMT.  Initial, Not Met     (3)   Patient will lift, push/pull   Initial, Not Met           Plan     Cont to advance PT as per POC  Functional/return to work training    Buddy Le, PT

## 2024-01-04 ENCOUNTER — CLINICAL SUPPORT (OUTPATIENT)
Dept: REHABILITATION | Facility: HOSPITAL | Age: 59
End: 2024-01-04
Payer: COMMERCIAL

## 2024-01-04 ENCOUNTER — TELEPHONE (OUTPATIENT)
Dept: SLEEP MEDICINE | Facility: OTHER | Age: 59
End: 2024-01-04
Payer: MEDICAID

## 2024-01-04 DIAGNOSIS — M53.86 DECREASED ROM OF INTERVERTEBRAL DISCS OF LUMBAR SPINE: Primary | ICD-10-CM

## 2024-01-04 DIAGNOSIS — R53.1 DECREASED STRENGTH: ICD-10-CM

## 2024-01-04 PROCEDURE — 97530 THERAPEUTIC ACTIVITIES: CPT | Mod: PN

## 2024-01-04 PROCEDURE — 97112 NEUROMUSCULAR REEDUCATION: CPT | Mod: PN

## 2024-01-04 PROCEDURE — 97110 THERAPEUTIC EXERCISES: CPT | Mod: PN

## 2024-01-04 NOTE — PROGRESS NOTES
[facility]   Physical Therapy Treatment Note      Name: Talya Santos Bates  Clinic Number: [id number]    Therapy Diagnosis:   Encounter Diagnoses   Name Primary?    Decreased ROM of intervertebral discs of lumbar spine Yes    Decreased strength        Physician: Colleen Min MD    Visit Date: 1/4/2024  Physician Orders: PT Eval and Treat  Medical Diagnosis from Referral:   Z02.6 (ICD-10-CM) - Encounter related to worker's compensation claim   S29.011D (ICD-10-CM) - Muscle strain of chest wall, subsequent encounter   S29.019D (ICD-10-CM) - Thoracic myofascial strain, subsequent encounter      Evaluation Date: 12/4/2023  Authorization Period Expiration: 11/9/24  Plan of Care Expiration: 2/2/24  Visit # / Visits authorized: 7/18, 2/20     Foto  Date  Score    #1/3 12/4/23     #2/3  12/27/23     #3/3          Time In: 1:05 PM  Time Out: 2:00 PM  Total Appointment Time (timed & untimed codes): 55 minutes (2 TA, 1 NMR, 1TE)     Precautions: Standard, Lupus, Kidney disease    Subjective     Patient reports:no back pain, still doing well. Had to cover her own duties and someone else's building (person called out) and a little more tired right now.     She was compliant with home exercise program.  Response to previous treatment: no adverse response   Functional change: none reported     Pain: 0/10  Location: right lumbar spine    Objective      Objective Measures updated at progress report unless specified.     Treatment     Talya received the treatments listed below:      therapeutic exercises to develop strength, endurance, ROM, flexibility, posture, and core stabilization for 15 minutes:    Open books: 10x10'' holds Bilateral   Bridges: 2x20 double leg   Bike: 8 minutes L3.5    neuromuscular re-education activities to improve: Balance, Coordination, and Posture for 10 minutes. The following activities were included:  Horizontal Abduction GTB 2x15 for upright posture (vc's on  technique)  Transverse Abdominal Bracing w/ straight leg raise 3x10, 1#    therapeutic activities to improve functional performance for 30 minutes, including:  Leg press: 3x10 double leg, 7 plates, sled=5  Paloff press double GTB 30x in each direction  Box Lift: 16 lb box 2x15 (box carry done in between sets)  Box carry: 16 lb box 2 lap (160 ft each)  CC push walkouts: 26 pounds (13lb each) 2x10  CC pull walkouts: 26 pounds (13lb each) 2x10  -  Patient Education and Home Exercises       Education provided:   - encouraged HEP compliance    Written Home Exercises Provided: Patient instructed to cont prior HEP. Exercises were reviewed and Talya was able to demonstrate them prior to the end of the session.  Talya demonstrated good  understanding of the education provided. See Electronic Medical Record under Patient Instructions for exercises provided during therapy sessions    Assessment     Talya is a 58 y.o. referred to outpatient Physical Therapy with a medical diagnosis of Muscle strain of chest wall and Thoracic myofascial strain after injuring her mid-low back trying to move a heavy table that she does not do frequently.      Improved squat mechanics, mild cueing for initial hip hinge. Pt arrived fatigued already but compliant with mild progressions today with no back pain after. Progressing functional strengthening and can increase resistance/volume of all activities next visit. She sees MD at the end of this month, will likely discharge within next 2 weeks per goal attainment.     Talya Is progressing well towards her goals.   Patient prognosis is Good.     Patient will continue to benefit from skilled outpatient physical therapy to address the deficits listed in the problem list box on initial evaluation, provide pt/family education and to maximize pt's level of independence in the home and community environment.    Patient's spiritual, cultural and educational needs considered and pt agreeable to plan of  care and goals.     Anticipated barriers to physical therapy: chronicity of current injury  Goals:   Short Terms Goals: 4 weeks     Goal  Progress  Date    (1)  Patient will be I with HOME EXERCISE PROGRAM  Initial, Not Met     (2)  Increase lumbar ROM to 100% of WNL in order to improve functional mobility.    Initial, Not Met      (3)  Pt will demo good sitting/standing posture and body mechanics for improved spine health and decreased risk of future injury.  Initial, Not Met         Long Term Goals: 8 weeks     Goal  Progress  Date    (1)  Report decreased low back pain without radiculopathy to </= 1/10 to increase tolerance for ADLs and increased QoL. Initial, Not Met      (2)  Increase strength to >/= 4/5 MMT grade for core and BLE to increase tolerance for ADL and work activities.  No pain with trunk MMT.  Initial, Not Met     (3)   Patient will lift, push/pull   Initial, Not Met           Plan     Cont to advance PT as per POC  Functional/return to work training    Imer Setvens, PT

## 2024-01-10 ENCOUNTER — TELEPHONE (OUTPATIENT)
Dept: SLEEP MEDICINE | Facility: OTHER | Age: 59
End: 2024-01-10
Payer: MEDICAID

## 2024-01-10 ENCOUNTER — CLINICAL SUPPORT (OUTPATIENT)
Dept: REHABILITATION | Facility: HOSPITAL | Age: 59
End: 2024-01-10
Payer: COMMERCIAL

## 2024-01-10 DIAGNOSIS — M53.86 DECREASED ROM OF INTERVERTEBRAL DISCS OF LUMBAR SPINE: Primary | ICD-10-CM

## 2024-01-10 PROCEDURE — 97110 THERAPEUTIC EXERCISES: CPT | Mod: PN,CQ

## 2024-01-10 PROCEDURE — 97112 NEUROMUSCULAR REEDUCATION: CPT | Mod: PN,CQ

## 2024-01-10 PROCEDURE — 97530 THERAPEUTIC ACTIVITIES: CPT | Mod: PN,CQ

## 2024-01-10 NOTE — PROGRESS NOTES
"[facility]   Physical Therapy Treatment Note      Name: Talya Bates  Clinic Number: [id number]    Therapy Diagnosis:   Encounter Diagnosis   Name Primary?    Decreased ROM of intervertebral discs of lumbar spine Yes     Physician: Colleen Min MD    Visit Date: 1/10/2024  Physician Orders: PT Eval and Treat  Medical Diagnosis from Referral:   Z02.6 (ICD-10-CM) - Encounter related to worker's compensation claim   S29.011D (ICD-10-CM) - Muscle strain of chest wall, subsequent encounter   S29.019D (ICD-10-CM) - Thoracic myofascial strain, subsequent encounter      Evaluation Date: 12/4/2023  Authorization Period Expiration: 11/9/24  Plan of Care Expiration: 2/2/24  Visit # / Visits authorized: 7/18, 3/20     Foto  Date  Score    #1/3 12/4/23     #2/3  12/27/23     #3/3          Time In: 1:05 PM  Time Out: 2:00 PM  Total Appointment Time (timed & untimed codes): 55 minutes (2 TA, 1 NMR, 1TE)     Precautions: Standard, Lupus, Kidney disease    Subjective     Patient reports:"I'm doing ok so far." Pt agreeable to PT session    She was compliant with home exercise program.  Response to previous treatment: no adverse response   Functional change: none reported     Pain: 0/10  Location: right lumbar spine    Objective      Objective Measures updated at progress report unless specified.     Treatment     Talya received the treatments listed below:      therapeutic exercises to develop strength, endurance, ROM, flexibility, posture, and core stabilization for 15 minutes:    Open books: 10x10'' holds Bilateral   Bridges: 2x20 double leg   Bike: 8 minutes L3.5    neuromuscular re-education activities to improve: Balance, Coordination, and Posture for 10 minutes. The following activities were included:  Horizontal Abduction GTB 2x15 for upright posture (vc's on technique)  Transverse Abdominal Bracing w/ straight leg raise 3x10, 1#    therapeutic activities to improve functional " performance for 30 minutes, including:  Leg press: 3x10 double leg, 7 plates, sled=5  Paloff press double GTB 30x in each direction  Box Lift: 16 lb box 2x15 (box carry done in between sets)  Box carry: 16 lb box 2 lap (160 ft each)  CC push walkouts: 26 pounds (13lb each) 2x10  CC pull walkouts: 26 pounds (13lb each) 2x10  -  Patient Education and Home Exercises       Education provided:   - encouraged HEP compliance    Written Home Exercises Provided: Patient instructed to cont prior HEP. Exercises were reviewed and Talya was able to demonstrate them prior to the end of the session.  Talya demonstrated good  understanding of the education provided. See Electronic Medical Record under Patient Instructions for exercises provided during therapy sessions    Assessment     Talya is a 58 y.o. referred to outpatient Physical Therapy with a medical diagnosis of Muscle strain of chest wall and Thoracic myofascial strain after injuring her mid-low back trying to move a heavy table that she does not do frequently.      Pt completed today's session with no reports of changes or worsening of pain / symptoms. Reviewed proper body mechanics and safety in standing and seated.     Talya Is progressing well towards her goals.   Patient prognosis is Good.     Patient will continue to benefit from skilled outpatient physical therapy to address the deficits listed in the problem list box on initial evaluation, provide pt/family education and to maximize pt's level of independence in the home and community environment.    Patient's spiritual, cultural and educational needs considered and pt agreeable to plan of care and goals.     Anticipated barriers to physical therapy: chronicity of current injury  Goals:   Short Terms Goals: 4 weeks     Goal  Progress  Date    (1)  Patient will be I with HOME EXERCISE PROGRAM  Initial, Not Met     (2)  Increase lumbar ROM to 100% of WNL in order to improve functional mobility.    Initial, Not  Met      (3)  Pt will demo good sitting/standing posture and body mechanics for improved spine health and decreased risk of future injury.  Initial, Not Met         Long Term Goals: 8 weeks     Goal  Progress  Date    (1)  Report decreased low back pain without radiculopathy to </= 1/10 to increase tolerance for ADLs and increased QoL. Initial, Not Met      (2)  Increase strength to >/= 4/5 MMT grade for core and BLE to increase tolerance for ADL and work activities.  No pain with trunk MMT.  Initial, Not Met     (3)   Patient will lift, push/pull   Initial, Not Met           Plan     Cont to advance PT as per POC  Functional/return to work training    Allan Mckeon, PTA

## 2024-01-17 ENCOUNTER — CLINICAL SUPPORT (OUTPATIENT)
Dept: REHABILITATION | Facility: HOSPITAL | Age: 59
End: 2024-01-17
Payer: COMMERCIAL

## 2024-01-17 DIAGNOSIS — R53.1 DECREASED STRENGTH: ICD-10-CM

## 2024-01-17 DIAGNOSIS — M53.86 DECREASED ROM OF INTERVERTEBRAL DISCS OF LUMBAR SPINE: Primary | ICD-10-CM

## 2024-01-17 PROCEDURE — 97110 THERAPEUTIC EXERCISES: CPT | Mod: PN

## 2024-01-17 PROCEDURE — 97530 THERAPEUTIC ACTIVITIES: CPT | Mod: PN

## 2024-01-17 NOTE — PROGRESS NOTES
[facility]   Physical Therapy Treatment Note      Name: Talya Santos Bates  Clinic Number: [id number]    Therapy Diagnosis:   Encounter Diagnoses   Name Primary?    Decreased ROM of intervertebral discs of lumbar spine Yes    Decreased strength      Physician: Colleen Min MD    Visit Date: 1/17/2024  Physician Orders: PT Eval and Treat  Medical Diagnosis from Referral:   Z02.6 (ICD-10-CM) - Encounter related to worker's compensation claim   S29.011D (ICD-10-CM) - Muscle strain of chest wall, subsequent encounter   S29.019D (ICD-10-CM) - Thoracic myofascial strain, subsequent encounter      Evaluation Date: 12/4/2023  Authorization Period Expiration: 11/9/24  Plan of Care Expiration: 2/2/24  Visit # / Visits authorized: 7/18, 4/20     Foto  Date  Score    #1/3 12/4/23     #2/3  12/27/23     #3/3          Time In: 1:05 PM  Time Out: 2:00 PM  Total Appointment Time (timed & untimed codes): 55 minutes (3TA, 1TE)     Precautions: Standard, Lupus, Kidney disease    Subjective     Patient reports: tired today, no back pain or new complaints.     She was compliant with home exercise program.  Response to previous treatment: no adverse response   Functional change: none reported     Pain: 0/10  Location: right lumbar spine    Objective      Objective Measures updated at progress report unless specified.     Treatment     Talya received the treatments listed below:      therapeutic exercises to develop strength, endurance, ROM, flexibility, posture, and core stabilization for 15 minutes:    Staggered single leg bridges: 3x6 Bilaterally  Transverse Abdominal Bracing w/ straight leg raise 3x10, 1#  Bike: 8 minutes L3.5    therapeutic activities to improve functional performance for 40 minutes, including:  Leg press: 2x20 double leg, 7 plates, sled=5  Paloff press double GTB 30x in each direction  Box Lift: 26 lb box 2x12 (box carry done in between sets)  Box carry: 26 lb box 2 lap (160 ft  each)  CC push walkouts: 26 pounds (13lb each) 2x10  CC pull walkouts: 26 pounds (13lb each) 2x10  Cable chops: next  Cable Lifts: next    Patient Education and Home Exercises       Education provided:   - encouraged HEP compliance    Written Home Exercises Provided: Patient instructed to cont prior HEP. Exercises were reviewed and Talya was able to demonstrate them prior to the end of the session.  Talya demonstrated good  understanding of the education provided. See Electronic Medical Record under Patient Instructions for exercises provided during therapy sessions    Assessment     Talya is a 58 y.o. referred to outpatient Physical Therapy with a medical diagnosis of Muscle strain of chest wall and Thoracic myofascial strain after injuring her mid-low back trying to move a heavy table that she does not do frequently.    Pt progressing well, no low back pain throughout, encouragement for continued exercise in session rather than patient being fatigued, and focusing on functional training at this time. Needs to perform heavy activities to make sure she can do heavy activities at work before occasion arises again.     Talya Is progressing well towards her goals.   Patient prognosis is Good.     Patient will continue to benefit from skilled outpatient physical therapy to address the deficits listed in the problem list box on initial evaluation, provide pt/family education and to maximize pt's level of independence in the home and community environment.    Patient's spiritual, cultural and educational needs considered and pt agreeable to plan of care and goals.     Anticipated barriers to physical therapy: chronicity of current injury  Goals:   Short Terms Goals: 4 weeks     Goal  Progress  Date    (1)  Patient will be I with HOME EXERCISE PROGRAM  Initial, Not Met     (2)  Increase lumbar ROM to 100% of WNL in order to improve functional mobility.    Initial, Not Met      (3)  Pt will demo good sitting/standing  posture and body mechanics for improved spine health and decreased risk of future injury.  Initial, Not Met         Long Term Goals: 8 weeks     Goal  Progress  Date    (1)  Report decreased low back pain without radiculopathy to </= 1/10 to increase tolerance for ADLs and increased QoL. Initial, Not Met      (2)  Increase strength to >/= 4/5 MMT grade for core and BLE to increase tolerance for ADL and work activities.  No pain with trunk MMT.  Initial, Not Met     (3)   Patient will lift, push/pull   Initial, Not Met           Plan     Cont to advance PT as per POC  Functional/return to work training    Imer Stevens, PT

## 2024-01-30 ENCOUNTER — OFFICE VISIT (OUTPATIENT)
Dept: URGENT CARE | Facility: CLINIC | Age: 59
End: 2024-01-30
Payer: COMMERCIAL

## 2024-01-30 VITALS
DIASTOLIC BLOOD PRESSURE: 89 MMHG | BODY MASS INDEX: 36.96 KG/M2 | TEMPERATURE: 99 F | OXYGEN SATURATION: 100 % | HEIGHT: 66 IN | WEIGHT: 230 LBS | SYSTOLIC BLOOD PRESSURE: 148 MMHG | RESPIRATION RATE: 18 BRPM | HEART RATE: 71 BPM

## 2024-01-30 DIAGNOSIS — Z02.6 ENCOUNTER RELATED TO WORKER'S COMPENSATION CLAIM: ICD-10-CM

## 2024-01-30 DIAGNOSIS — S29.019D THORACIC MYOFASCIAL STRAIN, SUBSEQUENT ENCOUNTER: ICD-10-CM

## 2024-01-30 DIAGNOSIS — S29.011D MUSCLE STRAIN OF CHEST WALL, SUBSEQUENT ENCOUNTER: Primary | ICD-10-CM

## 2024-01-30 PROCEDURE — 99214 OFFICE O/P EST MOD 30 MIN: CPT | Mod: S$GLB,,, | Performed by: SURGERY

## 2024-01-30 NOTE — PROGRESS NOTES
Subjective:      Patient ID: Talya Bates is a 59 y.o. female.    Chief Complaint: Back Pain (low) and Shoulder Injury (right)    Patient's place of employment - [employer]   Patient's job title -   Date of Injury - 10/23/23  Body part injured - Back / Right shoulder  Current work status per last visit - Regular  Improved, same, or worse - improved until PT so now she's the same  Pain Scale right now (1-10) -  6/10    Back Pain  Pertinent negatives include no numbness.   Shoulder Injury   Pertinent negatives include no numbness.       Musculoskeletal:  Positive for joint pain, abnormal ROM of joint, back pain and muscle ache. Negative for pain, trauma, joint swelling and muscle cramps.   Skin:  Negative for erythema and bruising.   Neurological:  Negative for numbness and tingling.     See MA note above. Begin MD note:    Talya Bates is a 59 y.o. presenting for follow-up of back and chest wall injury. She reports that she took two days off PT due to increased pain after squatting and lifting with new exercises. She had increased pain in back and knees, has prior history of knee pain. She used tylenol, heating pads, and rest to address pain. At work she takes break from difficulty tasks and returns to them later. She does feel that she has had an overall improvement in her symptoms.     Objective:     Physical Exam  Vitals and nursing note reviewed.   Constitutional:       General: She is not in acute distress.     Appearance: She is not ill-appearing.   HENT:      Head: Normocephalic.   Eyes:      Conjunctiva/sclera: Conjunctivae normal.   Pulmonary:      Effort: No respiratory distress.   Musculoskeletal:      Thoracic back: Tenderness present.     Skin:     General: Skin is warm and dry.      Findings: No erythema.   Neurological:      Mental Status: She is alert and oriented to person, place, and time.   Psychiatric:         Attention and Perception: Attention normal.         Mood  and Affect: Mood normal.         Behavior: Behavior normal     Assessment:      1. Muscle strain of chest wall, subsequent encounter    2. Encounter related to worker's compensation claim    3. Thoracic myofascial strain, subsequent encounter      Plan:     I reviewed the PT notes related to this injury, she is progressing toward goals but has not yet met any. Subjective report of improvement and improvement is noted on exam. I advised she continue PT, be mindful of her positioning and ask therapist to help correct as needed to prevent low back and knee pain with new activities. If necessary I think she would benefit from a second course of PT to ensure sufficient reduction in pain symptoms and provide some work conditioning. She feels able to continue regular duty and I am in agreement with that. Will follow-up in 1 month.     Diagnoses and plan discussed with the patient, as well as the expected course and duration of symptoms. Risks and benefits of any medication prescribed during this visit was explained, verbal instructions on use given. Clinic/Emergency department return precautions were given, can return to [facility] before scheduled follow-up appointment if notes worsening/aggravation of symptoms. All questions and concerns were addressed prior to discharge. Plan was developed with active input from the patient and they verbalized understanding of and agreement with the POC.  [facility] was informed of any referrals and relevant orders.  Note was dictated with voice recognition software, please excuse any grammatical errors.    I spent a total of 30 minutes on the day of the visit.  This includes face to face time and non-face to face time preparing to see the patient (eg, review of tests), obtaining and/or reviewing separately obtained history, documenting clinical information in the electronic or other health record, independently interpreting results and communicating results to the patient/family/caregiver, or  care coordinator.       Patient Instructions: Continue Physical Therapy   Restrictions: Regular Duty  Follow up in about 1 month (around 2/29/2024).

## 2024-01-30 NOTE — LETTER
[facility]  [address] [address]  Phone: [phone]  Fax: [phone]  [facility]: [email or web address]    Pt Name: Talya Bates  Injury Date: 10/23/2023   Employee ID: [id number] Date of Treatment: 01/30/2024   Company: [employer]      Appointment Time: 09:30 AM Arrived: 9:20 AM    Provider: Colleen Min MD Time Out:10:16 AM      Office Treatment:   1. Muscle strain of chest wall, subsequent encounter    2. Encounter related to worker's compensation claim    3. Thoracic myofascial strain, subsequent encounter          Patient Instructions: Continue Physical Therapy      Restrictions: Regular Duty     Return Appointment: 2/29/2024 at 9:00 AM JUAN MANUEL

## 2024-01-31 ENCOUNTER — CLINICAL SUPPORT (OUTPATIENT)
Dept: REHABILITATION | Facility: HOSPITAL | Age: 59
End: 2024-01-31
Payer: COMMERCIAL

## 2024-01-31 ENCOUNTER — PATIENT MESSAGE (OUTPATIENT)
Dept: OBSTETRICS AND GYNECOLOGY | Facility: CLINIC | Age: 59
End: 2024-01-31
Payer: MEDICAID

## 2024-01-31 DIAGNOSIS — M53.86 DECREASED ROM OF INTERVERTEBRAL DISCS OF LUMBAR SPINE: Primary | ICD-10-CM

## 2024-01-31 DIAGNOSIS — R53.1 DECREASED STRENGTH: ICD-10-CM

## 2024-01-31 PROCEDURE — 97110 THERAPEUTIC EXERCISES: CPT | Mod: PN,CQ

## 2024-01-31 PROCEDURE — 97530 THERAPEUTIC ACTIVITIES: CPT | Mod: PN,CQ

## 2024-01-31 NOTE — PROGRESS NOTES
"[facility]   Physical Therapy Treatment Note      Name: Talya Bates  Clinic Number: [id number]    Therapy Diagnosis:   Encounter Diagnoses   Name Primary?    Decreased ROM of intervertebral discs of lumbar spine Yes    Decreased strength      Physician: Colleen Min MD    Visit Date: 1/31/2024  Physician Orders: PT Eval and Treat  Medical Diagnosis from Referral:   Z02.6 (ICD-10-CM) - Encounter related to worker's compensation claim   S29.011D (ICD-10-CM) - Muscle strain of chest wall, subsequent encounter   S29.019D (ICD-10-CM) - Thoracic myofascial strain, subsequent encounter      Evaluation Date: 12/4/2023  Authorization Period Expiration: 11/9/24  Plan of Care Expiration: 2/2/24  Visit # / Visits authorized: 7/18, 4/20     Foto  Date  Score    #1/3 12/4/23     #2/3  12/27/23     #3/3          Time In: 1:00 PM  Time Out: 2:00 PM  Total Appointment Time (timed & untimed codes): 55 minutes (2TA, 2TE)     Precautions: Standard, Lupus, Kidney disease    Subjective     Patient reports: "I'm just feeling tired today". Agreeable to PT session. She requested to defer box lifting activities.     She was compliant with home exercise program.  Response to previous treatment: no adverse response   Functional change: none reported     Pain: 0/10  Location: right lumbar spine    Objective        Treatment     Talya received the treatments listed below:      therapeutic exercises to develop strength, endurance, ROM, flexibility, posture, and core stabilization for 20 minutes:    Staggered single leg bridges: 3x6 Bilaterally  Transverse Abdominal Bracing w/ straight leg raise 3x10, 1#  Bike: 8 minutes L3.5    therapeutic activities to improve functional performance for 35 minutes, including:  Leg press: 2x20 double leg, 7 plates, sled=5  Paloff press double GTB 30x in each direction  CC push walkouts: 26 pounds (13lb each) 2x10  CC pull walkouts: 26 pounds (13lb each) 2x10  RTB chops: " 2x10 B  RTB Lifts: 2x10 B    NOT TODAY:  Box Lift: 26 lb box 2x12 (box carry done in between sets)  Box carry: 26 lb box 2 lap (160 ft each)      Patient Education and Home Exercises       Education provided:   - encouraged HEP compliance    Written Home Exercises Provided: Patient instructed to cont prior HEP. Exercises were reviewed and Talya was able to demonstrate them prior to the end of the session.  Talya demonstrated good  understanding of the education provided. See Electronic Medical Record under Patient Instructions for exercises provided during therapy sessions    Assessment     Talya is a 58 y.o. referred to outpatient Physical Therapy with a medical diagnosis of Muscle strain of chest wall and Thoracic myofascial strain after injuring her mid-low back trying to move a heavy table that she does not do frequently.    Pt completed session today with no reports of increased lumbar pain. Deferred box lift/ simulation activities to prevent provocation of pain and as advised by primary PT, Imer Stevens DPT. Verbal instructions provided on postural awareness w/ use of resistive therabands in standing.    Talya Is progressing well towards her goals.   Patient prognosis is Good.     Patient will continue to benefit from skilled outpatient physical therapy to address the deficits listed in the problem list box on initial evaluation, provide pt/family education and to maximize pt's level of independence in the home and community environment.    Patient's spiritual, cultural and educational needs considered and pt agreeable to plan of care and goals.     Anticipated barriers to physical therapy: chronicity of current injury  Goals:   Short Terms Goals: 4 weeks     Goal  Progress  Date    (1)  Patient will be I with HOME EXERCISE PROGRAM  Initial, Not Met     (2)  Increase lumbar ROM to 100% of WNL in order to improve functional mobility.    Initial, Not Met      (3)  Pt will demo good sitting/standing posture  and body mechanics for improved spine health and decreased risk of future injury.  Initial, Not Met         Long Term Goals: 8 weeks     Goal  Progress  Date    (1)  Report decreased low back pain without radiculopathy to </= 1/10 to increase tolerance for ADLs and increased QoL. Initial, Not Met      (2)  Increase strength to >/= 4/5 MMT grade for core and BLE to increase tolerance for ADL and work activities.  No pain with trunk MMT.  Initial, Not Met     (3)   Patient will lift, push/pull   Initial, Not Met           Plan     Cont to advance PT as per POC    Allan Mckeon, PTA

## 2024-02-07 ENCOUNTER — CLINICAL SUPPORT (OUTPATIENT)
Dept: REHABILITATION | Facility: HOSPITAL | Age: 59
End: 2024-02-07
Payer: COMMERCIAL

## 2024-02-07 DIAGNOSIS — M53.86 DECREASED ROM OF INTERVERTEBRAL DISCS OF LUMBAR SPINE: Primary | ICD-10-CM

## 2024-02-07 DIAGNOSIS — R53.1 DECREASED STRENGTH: ICD-10-CM

## 2024-02-07 PROCEDURE — 97530 THERAPEUTIC ACTIVITIES: CPT | Mod: PN

## 2024-02-07 PROCEDURE — 97110 THERAPEUTIC EXERCISES: CPT | Mod: PN

## 2024-02-07 NOTE — PROGRESS NOTES
[facility]   Physical Therapy Treatment Note      Name: Talya Bates  Clinic Number: [id number]    Therapy Diagnosis:   Encounter Diagnoses   Name Primary?    Decreased ROM of intervertebral discs of lumbar spine Yes    Decreased strength      Physician: Colleen Min MD    Visit Date: 2/7/2024  Physician Orders: PT Eval and Treat  Medical Diagnosis from Referral:   Z02.6 (ICD-10-CM) - Encounter related to worker's compensation claim   S29.011D (ICD-10-CM) - Muscle strain of chest wall, subsequent encounter   S29.019D (ICD-10-CM) - Thoracic myofascial strain, subsequent encounter      Evaluation Date: 12/4/2023  Authorization Period Expiration: 11/9/24  Plan of Care Expiration: 2/2/24  Visit # / Visits authorized: 7/18, 4/20     Foto  Date  Score    #1/3 12/4/23     #2/3  12/27/23     #3/3          Time In: 1:00 PM  Time Out: 1:55 PM  Total Appointment Time (timed & untimed codes): 50 minutes (2TA, 1TE)     Precautions: Standard, Lupus, Kidney disease    Subjective     Patient reports: she doing okay, little sore.    She was compliant with home exercise program.  Response to previous treatment: no adverse response   Functional change: none reported     Pain: 2/10  Location: right lumbar spine    Objective        Treatment     Talya received the treatments listed below:      therapeutic exercises to develop strength, endurance, ROM, flexibility, posture, and core stabilization for 15 minutes:    Staggered single leg bridges: 2x10 Bilaterally  Transverse Abdominal Bracing w/ straight leg raise x10, 1.5# high level of fatigue, requested x10 reps  Bike: 8 minutes L3.5    therapeutic activities to improve functional performance for 35 minutes, including:  Leg press: 2x20 double leg, 7 plates, sled=5  Leg press single leg x10 3 plates  Paloff press double GTB 30x in each direction  CC push walkouts: 26 pounds (13lb each) x15  CC pull walkouts: 26 pounds (13lb each)   Anti- rotation  resistant SA rows GTB x10 ea side    RTB chops: 2x10 B  RTB Lifts: 2x10 B    NOT TODAY:- hold for now  Box Lift: 26 lb box 2x12 (box carry done in between sets)  Box carry: 26 lb box 2 lap (160 ft each)      Patient Education and Home Exercises       Education provided:   - encouraged HEP compliance    Written Home Exercises Provided: Patient instructed to cont prior HEP. Exercises were reviewed and Talya was able to demonstrate them prior to the end of the session.  Talya demonstrated good  understanding of the education provided. See Electronic Medical Record under Patient Instructions for exercises provided during therapy sessions    Assessment     Talya is a 58 y.o. referred to outpatient Physical Therapy with a medical diagnosis of Muscle strain of chest wall and Thoracic myofascial strain after injuring her mid-low back trying to move a heavy table that she does not do frequently.    Continued Deferred box lift/ simulation activities to prevent provocation of pain and as advised by primary PT, Imer Stevens DPT. Tolerated slight increase in weight during there-ex but with added fatigue. Will continue to progress toward work-related tasks as per tolerance will allow.     Talya Is progressing well towards her goals.   Patient prognosis is Good.     Patient will continue to benefit from skilled outpatient physical therapy to address the deficits listed in the problem list box on initial evaluation, provide pt/family education and to maximize pt's level of independence in the home and community environment.    Patient's spiritual, cultural and educational needs considered and pt agreeable to plan of care and goals.     Anticipated barriers to physical therapy: chronicity of current injury  Goals:   Short Terms Goals: 4 weeks     Goal  Progress  Date    (1)  Patient will be I with HOME EXERCISE PROGRAM  Initial, Not Met     (2)  Increase lumbar ROM to 100% of WNL in order to improve functional mobility.     Initial, Not Met      (3)  Pt will demo good sitting/standing posture and body mechanics for improved spine health and decreased risk of future injury.  Initial, Not Met         Long Term Goals: 8 weeks     Goal  Progress  Date    (1)  Report decreased low back pain without radiculopathy to </= 1/10 to increase tolerance for ADLs and increased QoL. Initial, Not Met      (2)  Increase strength to >/= 4/5 MMT grade for core and BLE to increase tolerance for ADL and work activities.  No pain with trunk MMT.  Initial, Not Met     (3)   Patient will lift, push/pull   Initial, Not Met           Plan     Cont to advance PT as per POC    Larry Wilkes PT

## 2024-02-14 ENCOUNTER — CLINICAL SUPPORT (OUTPATIENT)
Dept: REHABILITATION | Facility: HOSPITAL | Age: 59
End: 2024-02-14
Payer: COMMERCIAL

## 2024-02-14 DIAGNOSIS — M53.86 DECREASED ROM OF INTERVERTEBRAL DISCS OF LUMBAR SPINE: Primary | ICD-10-CM

## 2024-02-14 DIAGNOSIS — R53.1 DECREASED STRENGTH: ICD-10-CM

## 2024-02-14 PROCEDURE — 97530 THERAPEUTIC ACTIVITIES: CPT | Mod: PN

## 2024-02-14 PROCEDURE — 97110 THERAPEUTIC EXERCISES: CPT | Mod: PN

## 2024-02-14 NOTE — PROGRESS NOTES
[facility]   Physical Therapy Treatment Note      Name: Talya Bates  Clinic Number: [id number]    Therapy Diagnosis:   Encounter Diagnoses   Name Primary?    Decreased ROM of intervertebral discs of lumbar spine Yes    Decreased strength      Physician: Colleen Min MD    Visit Date: 2/14/2024  Physician Orders: PT Eval and Treat  Medical Diagnosis from Referral:   Z02.6 (ICD-10-CM) - Encounter related to worker's compensation claim   S29.011D (ICD-10-CM) - Muscle strain of chest wall, subsequent encounter   S29.019D (ICD-10-CM) - Thoracic myofascial strain, subsequent encounter      Evaluation Date: 12/4/2023  Authorization Period Expiration: 11/9/24  Plan of Care Expiration: 2/2/24  Visit # / Visits authorized: 7/18, 5/20     Foto  Date  Score    #1/3 12/4/23     #2/3  12/27/23     #3/3          Time In: 1:18 PM  Time Out: 1:56 PM  Total Appointment Time (timed & untimed codes): 38 minutes (2TA, 1TE)     Precautions: Standard, Lupus, Kidney disease    Subjective     Patient reports:no complaints, she is doing well.    She was compliant with home exercise program.  Response to previous treatment: no adverse response   Functional change: none reported     Pain: 2/10  Location: right lumbar spine    Objective        Treatment     Talya received the treatments listed below:      therapeutic exercises to develop strength, endurance, ROM, flexibility, posture, and core stabilization for 15 minutes:    Staggered single leg bridges: 2x10 Bilaterally  Transverse Abdominal Bracing w/ straight leg raise x10, 1.5# high level of fatigue, requested x10 reps  Bike: 8 minutes L3.5    therapeutic activities to improve functional performance for 35 minutes, including:  Leg press: 2x20 double leg, 7 plates, sled=5  Leg press single leg x10 3 plates  Paloff press double GTB 30x in each direction  CC push walkouts: 26 pounds (13lb each) x15  CC pull walkouts: 26 pounds (13lb each)  Anti-  rotation resistant SA rows GTB x10 ea side    RTB chops: 2x10 B  RTB Lifts: 2x10 B    NOT TODAY:- hold for now  Box Lift: 26 lb box 2x12 (box carry done in between sets)  Box carry: 26 lb box 2 lap (160 ft each)    Patient Education and Home Exercises       Education provided:   - encouraged HEP compliance    Written Home Exercises Provided: Patient instructed to cont prior HEP. Exercises were reviewed and Talya was able to demonstrate them prior to the end of the session.  Talya demonstrated good  understanding of the education provided. See Electronic Medical Record under Patient Instructions for exercises provided during therapy sessions    Assessment     Talya is a 58 y.o. referred to outpatient Physical Therapy with a medical diagnosis of Muscle strain of chest wall and Thoracic myofascial strain after injuring her mid-low back trying to move a heavy table that she does not do frequently.    Tolerated slight increase in weight during there-ex but with added fatigue. Will continue to progress toward work-related tasks as per tolerance will allow. No adverse effects noted during treatment session.    Talya Is progressing well towards her goals.   Patient prognosis is Good.     Patient will continue to benefit from skilled outpatient physical therapy to address the deficits listed in the problem list box on initial evaluation, provide pt/family education and to maximize pt's level of independence in the home and community environment.    Patient's spiritual, cultural and educational needs considered and pt agreeable to plan of care and goals.     Anticipated barriers to physical therapy: chronicity of current injury  Goals:   Short Terms Goals: 4 weeks     Goal  Progress  Date    (1)  Patient will be I with HOME EXERCISE PROGRAM  Initial, Not Met     (2)  Increase lumbar ROM to 100% of WNL in order to improve functional mobility.    Initial, Not Met      (3)  Pt will demo good sitting/standing posture and body  mechanics for improved spine health and decreased risk of future injury.  Initial, Not Met         Long Term Goals: 8 weeks     Goal  Progress  Date    (1)  Report decreased low back pain without radiculopathy to </= 1/10 to increase tolerance for ADLs and increased QoL. Initial, Not Met      (2)  Increase strength to >/= 4/5 MMT grade for core and BLE to increase tolerance for ADL and work activities.  No pain with trunk MMT.  Initial, Not Met     (3)   Patient will lift, push/pull   Initial, Not Met           Plan     Cont to advance PT as per POC    Larry Wilkes PT

## 2024-02-28 ENCOUNTER — CLINICAL SUPPORT (OUTPATIENT)
Dept: REHABILITATION | Facility: HOSPITAL | Age: 59
End: 2024-02-28
Payer: COMMERCIAL

## 2024-02-28 DIAGNOSIS — M53.86 DECREASED ROM OF INTERVERTEBRAL DISCS OF LUMBAR SPINE: Primary | ICD-10-CM

## 2024-02-28 DIAGNOSIS — R53.1 DECREASED STRENGTH: ICD-10-CM

## 2024-02-28 PROCEDURE — 97110 THERAPEUTIC EXERCISES: CPT | Mod: PN | Performed by: PHYSICAL THERAPIST

## 2024-02-28 NOTE — PLAN OF CARE
[facility]  Physical Therapy Plan of Care Note     Visit Date: 2024    Name: Talya Bates  Clinic Number: [id number]    Therapy Diagnosis:   Encounter Diagnoses   Name Primary?    Decreased ROM of intervertebral discs of lumbar spine Yes    Decreased strength      Physician: Colleen Min MD    Physician Orders: PT Eval and Treat  Medical Diagnosis from Referral:   Z02.6 (ICD-10-CM) - Encounter related to worker's compensation claim   S29.011D (ICD-10-CM) - Muscle strain of chest wall, subsequent encounter   S29.019D (ICD-10-CM) - Thoracic myofascial strain, subsequent encounter      Evaluation Date: 2023  Authorization Period Expiration: 24  Plan of Care Expiration: 24  Visit # / Visits authorized: ,     Occupation/job title: [employer]   Job demands: bending, stooping, walking/standing, lifting 50-60# at a time, sweeping/mopping/vacuuming      Current work restrictions: Regular duty  Previous work status: full time  Current work status: full time  Date last worked (if applicable): did take 1-2 days off of after injury    Subjective     Update: She hasn't had any pain recently and feels ready for discharge. Work is ok without any restrictions    Objective      Update:   A/PROM and MMT:  * = right side of low back pain with testing  NT = Not tested  AROM: (degrees) LUMBAR   Flexion (40-50) 50% --> 100%   Extension (15-20) 100%   Right side bending (~20) 100%   Left side bending  (~20) 90% --> 100%    Right rotation (5-10) 100%   Left rotation (5-10) 75% --> 100%      Shoulder AROM/MMT:               Flexion: 100% Bilateral, 5/5 right, 5/5 left              Abduction: 100% Bilateral. 5/5 Bilateral               Extension: 100% Bilateral, 5/5 Bilateral               External rotation: 100% Bilateral, 5/5 Bilateral       Trunk MMT:              Flexion: 5/5              Extensions: 5/5              Sidebendin/5 for resisted right  sidebending, 5/5 left sidebend  Rotation: 5/5 for resisted right rotation, 5/5 left rotation      Hip   Right     Left   Pain/Dysfunction with Movement     AROM PROM MMT AROM PROM MMT     Flexion (L2,120 deg) WFL WFL 4+/5 WFL WFL 4+/5     Extension (20 deg) WFL WFL 4/5 WFL WFL 4/5     Abduction (L5, 45 deg) WFL WFL 4+/5 WFL WFL 5/5     Adduction (30 deg) WFL WFL 5/5 WFL WFL 5/5        Knee   Right     Left   Pain/Dysfunction with Movement     AROM PROM MMT AROM PROM MMT     Flexion (S2, 140 deg) WFL WFL 4+/5 WFL WFL 5/5     Extension (L3, 0-5 deg) WFL WFL 4+/5 WFL WFL 5/5        Ankle   Right     Left   Pain/Dysfunction with Movement     AROM PROM MMT AROM PROM MMT     Dorsiflexion (L4, 20 deg) WFL WFL 5/5 WFL WFL 5/5     Plantarflexion (S1, 50 deg) WFL WFL 5/5 WFL WFL 5/5     Great toe extension (L5, 70 deg) WFL WFL 5/5 WFL WFL 5/5          Functional Job Specific Testing:   Job Specific Task Job Demands Current Ability   1. Lifting/carrying 50# Frequently Yes 1x   2. Bending/stooping/sweeping Constantly Yes   3. Walking/standing Constantly  Yes        (Include impairment and functional measures as well as updated Functional Job Specific Testing chart)    Assessment     Update: Talya is a 58 y.o. referred to outpatient Physical Therapy with a medical diagnosis of Muscle strain of chest wall and Thoracic myofascial strain after injuring her mid-low back trying to move a heavy table that she does not do frequently.  She has completed 13 PT visits and reports being asymptomatic. PT has progressed to functional training and body mechanics with pushing, pulling and box lifts without pain. She's been working full time without any inc in symptoms. She has met all of her goals and is appropriate for discharge at this time.    Previous Short Term Goals Status:   met all   New Short Term Goals Status:   none    Long Term Goal Status:   continue per initial plan of care.      Goals:   Short Terms Goals: 4 weeks     Goal   Progress  Date    (1)  Patient will be I with HOME EXERCISE PROGRAM  Met 2/28/24    (2)  Increase lumbar ROM to 100% of WNL in order to improve functional mobility.    Met   2/28/24   (3)  Pt will demo good sitting/standing posture and body mechanics for improved spine health and decreased risk of future injury.  Met  2/28/24       Long Term Goals: 8 weeks     Goal  Progress  Date    (1)  Report decreased low back pain without radiculopathy to </= 1/10 to increase tolerance for ADLs and increased QoL.  Met  2/28/24    (2)  Increase strength to >/= 4/5 MMT grade for core and BLE to increase tolerance for ADL and work activities.  No pain with trunk MMT.  Met  2/28/24   (3)   Patient will lift, push/pull   Met  2/28/24       Reasons for Recertification of Therapy:   UPOC for DC    Plan     Updated Certification Period: 2/28/2024 to 2/28/24  Recommended Treatment Plan: 1 times per week for 1 weeks: Electrical Stimulation TENS, IFC, NMES, Gait Training, Manual Therapy, Moist Heat/ Ice, Neuromuscular Re-ed, Patient Education, Self Care, Therapeutic Activities, Therapeutic Exercise, and dry needling  Other Recommendations: DC today    Upon discharge from further skilled PT intervention it will determined if the need for a work conditioning program or Functional Capacity Evaluation is required to allow the injured worker to return to work with full potential achieved, continued improvement with body mechanics with advanced functional activities, and further prevention of future work-related injuries.     Buddy Le, PT  2/28/2024

## 2024-02-28 NOTE — PROGRESS NOTES
[facility]   Physical Therapy DISCHARGE Note      Name: Talya Bates  [facility] Number: [id number]    Therapy Diagnosis:   Encounter Diagnoses   Name Primary?    Decreased ROM of intervertebral discs of lumbar spine Yes    Decreased strength      Physician: Colleen Min MD    Visit Date: 2/28/2024  Physician Orders: PT Eval and Treat  Medical Diagnosis from Referral:   Z02.6 (ICD-10-CM) - Encounter related to worker's compensation claim   S29.011D (ICD-10-CM) - Muscle strain of chest wall, subsequent encounter   S29.019D (ICD-10-CM) - Thoracic myofascial strain, subsequent encounter      Evaluation Date: 12/4/2023  Authorization Period Expiration: 11/9/24  Plan of Care Expiration: 2/2/24  Visit # / Visits authorized: 7/18, 5/20     Foto  Date  Score    #1/3 12/4/23     #2/3  12/27/23     #3/3          Time In: 1:08 PM  Time Out: 1:38 PM  Total Appointment Time (timed & untimed codes): 30 minutes (2 TE)     Precautions: Standard, Lupus, Kidney disease    Subjective     Patient reports: See UPOC    She was compliant with home exercise program.  Response to previous treatment: no adverse response   Functional change: none reported     Pain: 2/10  Location: right lumbar spine    Objective      See UPOC on 2/28/24    Treatment     Talya received the treatments listed below:      therapeutic exercises to develop strength, endurance, ROM, flexibility, posture, and core stabilization for 30 minutes:    Objective measures  Updated home exercise program  Education regarding benefits of attending a gym, continuing exercise routine, what to do if symptoms return.    Patient Education and Home Exercises       Education provided:   - encouraged HEP compliance    Written Home Exercises Provided: Patient instructed to cont prior HEP. Exercises were reviewed and Talya was able to demonstrate them prior to the end of the session.  Talya demonstrated good  understanding of the education provided.  See Electronic Medical Record under Patient Instructions for exercises provided during therapy sessions    Assessment     Talya is a 58 y.o. referred to outpatient Physical Therapy with a medical diagnosis of Muscle strain of chest wall and Thoracic myofascial strain after injuring her mid-low back trying to move a heavy table that she does not do frequently.    See UPOC. Pt is agreeable and appropriate for discharge and has met all of her goals.    Talya Is progressing well towards her goals.   Patient prognosis is Good.     Patient will continue to benefit from skilled outpatient physical therapy to address the deficits listed in the problem list box on initial evaluation, provide pt/family education and to maximize pt's level of independence in the home and community environment.    Patient's spiritual, cultural and educational needs considered and pt agreeable to plan of care and goals.     Anticipated barriers to physical therapy: chronicity of current injury  Goals:   Short Terms Goals: 4 weeks     Goal  Progress  Date    (1)  Patient will be I with HOME EXERCISE PROGRAM  Met 2/28/24    (2)  Increase lumbar ROM to 100% of WNL in order to improve functional mobility.    Met   2/28/24   (3)  Pt will demo good sitting/standing posture and body mechanics for improved spine health and decreased risk of future injury.  Met  2/28/24       Long Term Goals: 8 weeks     Goal  Progress  Date    (1)  Report decreased low back pain without radiculopathy to </= 1/10 to increase tolerance for ADLs and increased QoL.  Met  2/28/24    (2)  Increase strength to >/= 4/5 MMT grade for core and BLE to increase tolerance for ADL and work activities.  No pain with trunk MMT.  Met  2/28/24   (3)   Patient will lift, push/pull   Met  2/28/24            Plan     DISCHARGE    Buddy Le, PT

## 2024-02-29 ENCOUNTER — OFFICE VISIT (OUTPATIENT)
Dept: URGENT CARE | Facility: CLINIC | Age: 59
End: 2024-02-29
Payer: COMMERCIAL

## 2024-02-29 VITALS
BODY MASS INDEX: 36.96 KG/M2 | SYSTOLIC BLOOD PRESSURE: 131 MMHG | HEIGHT: 66 IN | OXYGEN SATURATION: 98 % | HEART RATE: 93 BPM | DIASTOLIC BLOOD PRESSURE: 79 MMHG | WEIGHT: 230 LBS | RESPIRATION RATE: 18 BRPM

## 2024-02-29 DIAGNOSIS — S29.019D THORACIC MYOFASCIAL STRAIN, SUBSEQUENT ENCOUNTER: ICD-10-CM

## 2024-02-29 DIAGNOSIS — Z02.6 ENCOUNTER RELATED TO WORKER'S COMPENSATION CLAIM: ICD-10-CM

## 2024-02-29 DIAGNOSIS — S29.011D MUSCLE STRAIN OF CHEST WALL, SUBSEQUENT ENCOUNTER: Primary | ICD-10-CM

## 2024-02-29 PROCEDURE — 99214 OFFICE O/P EST MOD 30 MIN: CPT | Mod: S$GLB,,, | Performed by: SURGERY

## 2024-02-29 NOTE — PROGRESS NOTES
Subjective:      Patient ID: Talya Bates is a 59 y.o. female.    Chief Complaint: Back Pain and Shoulder Pain (RT)    Patient's place of employment - [employer]  Patient's job title -   Date of Injury - 10-23-23  Body part injured - Back, RT Shoulder  Current work status per last visit - Regular Duty  Improved, same, or worse - Improved  Pain Scale right now (1-10) -  0/10    Back Pain  Pertinent negatives include no numbness.   Shoulder Pain   Pertinent negatives include no limited range of motion or numbness.       Musculoskeletal:  Positive for muscle ache. Negative for pain, trauma, joint pain, joint swelling, abnormal ROM of joint, back pain and muscle cramps.   Neurological:  Negative for numbness and tingling.     See MA note above. Ector MYERS note:    Talya Bates is a 59 y.o. presenting for follow-up of back and chest wall injury. She has completed all of her therapy. She still feels a tightness in the area, but is not pain like before, prior she could not bend to pick something without pain. She is using an ointment provided to her by a family member, notes improvements with use. She continues to use cold compresses intermittently.     Objective:     Physical Exam  Vitals and nursing note reviewed.   HENT:      Head: Normocephalic.   Eyes:      Conjunctiva/sclera: Conjunctivae normal.   Pulmonary:      Effort: Pulmonary effort is normal.   Musculoskeletal:         General: Normal range of motion.      Comments: Some tension appreciated to the right posterior thorax near the scapula and extending laterally and anteriorly, no pain with palpation.    Neurological:      General: No focal deficit present.      Mental Status: She is alert and oriented to person, place, and time.      Motor: Motor function is intact.      Coordination: Coordination is intact.   Psychiatric:         Attention and Perception: Attention normal.         Mood and Affect: Mood normal.         Speech: Speech  normal.         Behavior: Behavior normal. Behavior is cooperative.         Thought Content: Thought content normal.        Assessment:      1. Muscle strain of chest wall, subsequent encounter    2. Encounter related to worker's compensation claim    3. Thoracic myofascial strain, subsequent encounter      Plan:     I reviewed the PT notes related to this injury, she has met all treatment goals. She has experienced a near return to baseline, I anticipate with continued stretching and strengthening as advised by PT she will note a complete resolution of symptoms. Continue regular duty. Discharge from [facility].    Diagnoses and plan discussed with the patient, all questions and concerns were addressed prior to discharge. Follow-up as needed if any reoccurrence of symptoms related to the present condition. Plan was developed with active input from the patient and they verbalized understanding of and agreement with the POC.   Note was dictated with voice recognition software, please excuse any grammatical errors.    I spent a total of 30 minutes on the day of the visit.  This includes face to face time and non-face to face time preparing to see the patient (eg, review of tests), obtaining and/or reviewing separately obtained history, documenting clinical information in the electronic or other health record, independently interpreting results and communicating results to the patient/family/caregiver, or care coordinator.           Restrictions: Regular Duty, Discharged from Occupational Health  Follow up if symptoms worsen or fail to improve.

## 2024-02-29 NOTE — LETTER
[facility]  [address] [address]  Phone: [phone]  Fax: [phone]  [facility] [employer]: [email or web address]    Pt Name: Talya Bates  Injury Date: 10/23/2023   Employee ID: [id number] Date of Treatment: 02/29/2024   Company: [employer]      Appointment Time: 09:00 AM Arrived: 8:54 AM    Provider: Colleen Min MD Time Out:9:45 AM      Office Treatment:   1. Muscle strain of chest wall, subsequent encounter    2. Encounter related to worker's compensation claim    3. Thoracic myofascial strain, subsequent encounter               Restrictions: Regular Duty, Discharged from Occupational Health     Return As needed. HELEN

## 2024-05-20 ENCOUNTER — CLINICAL SUPPORT (OUTPATIENT)
Dept: OTHER | Facility: CLINIC | Age: 59
End: 2024-05-20
Payer: COMMERCIAL

## 2024-05-20 DIAGNOSIS — Z00.8 ENCOUNTER FOR OTHER GENERAL EXAMINATION: ICD-10-CM

## 2024-05-20 PROCEDURE — 99401 PREV MED CNSL INDIV APPRX 15: CPT | Mod: S$GLB,,, | Performed by: INTERNAL MEDICINE

## 2024-05-20 PROCEDURE — 82947 ASSAY GLUCOSE BLOOD QUANT: CPT | Mod: QW,S$GLB,, | Performed by: INTERNAL MEDICINE

## 2024-05-20 PROCEDURE — 80061 LIPID PANEL: CPT | Mod: QW,S$GLB,, | Performed by: INTERNAL MEDICINE

## 2024-05-22 VITALS
SYSTOLIC BLOOD PRESSURE: 128 MMHG | BODY MASS INDEX: 34.87 KG/M2 | WEIGHT: 217 LBS | HEIGHT: 66 IN | DIASTOLIC BLOOD PRESSURE: 86 MMHG

## 2024-05-22 LAB
HDLC SERPL-MCNC: 76 MG/DL
POC CHOLESTEROL, LDL (DOCK): 148 MG/DL
POC CHOLESTEROL, TOTAL: 239 MG/DL
POC GLUCOSE, FASTING: 96 MG/DL (ref 60–110)
TRIGL SERPL-MCNC: 90 MG/DL

## 2025-05-30 ENCOUNTER — CLINICAL SUPPORT (OUTPATIENT)
Dept: OTHER | Facility: CLINIC | Age: 60
End: 2025-05-30
Payer: COMMERCIAL

## 2025-05-30 DIAGNOSIS — Z00.8 ENCOUNTER FOR OTHER GENERAL EXAMINATION: ICD-10-CM

## 2025-06-03 VITALS
SYSTOLIC BLOOD PRESSURE: 111 MMHG | WEIGHT: 196 LBS | DIASTOLIC BLOOD PRESSURE: 69 MMHG | BODY MASS INDEX: 31.5 KG/M2 | HEIGHT: 66 IN

## 2025-06-03 LAB
HDLC SERPL-MCNC: 53 MG/DL
POC CHOLESTEROL, LDL (DOCK): 110 MG/DL
POC CHOLESTEROL, TOTAL: 183 MG/DL
POC GLUCOSE, FASTING: 78 MG/DL (ref 60–110)
TRIGL SERPL-MCNC: 109 MG/DL

## 2025-07-29 ENCOUNTER — TELEPHONE (OUTPATIENT)
Dept: RHEUMATOLOGY | Facility: CLINIC | Age: 60
End: 2025-07-29
Payer: COMMERCIAL

## 2025-07-29 NOTE — TELEPHONE ENCOUNTER
Left voicemail asking patient to contact the office back.         Copied from CRM #[id number]. Topic: Appointments - Appointment Access  >> Jul 29, 2025  9:18 AM Courtney Jenkins wrote:  Patient calling regarding Appointment Access (message) for *Scheduling Request     Appt Type: New Patient - Systemic lupus (reestablishing care)      Date/Time Preference: first available     Treating Provider: Tuan or any available provider     Caller Name: Patient      Contact Preference: [phone]

## 2025-08-01 ENCOUNTER — PATIENT MESSAGE (OUTPATIENT)
Dept: RHEUMATOLOGY | Facility: CLINIC | Age: 60
End: 2025-08-01
Payer: COMMERCIAL

## 2025-08-11 ENCOUNTER — TELEPHONE (OUTPATIENT)
Dept: RHEUMATOLOGY | Facility: CLINIC | Age: 60
End: 2025-08-11
Payer: COMMERCIAL

## 2025-09-05 ENCOUNTER — HOSPITAL ENCOUNTER (OUTPATIENT)
Dept: RADIOLOGY | Facility: HOSPITAL | Age: 60
Discharge: HOME | End: 2025-09-05
Attending: INTERNAL MEDICINE
Payer: COMMERCIAL

## 2025-09-05 ENCOUNTER — TELEPHONE (OUTPATIENT)
Dept: RHEUMATOLOGY | Facility: CLINIC | Age: 60
End: 2025-09-05

## 2025-09-05 ENCOUNTER — OFFICE VISIT (OUTPATIENT)
Dept: RHEUMATOLOGY | Facility: CLINIC | Age: 60
End: 2025-09-05
Payer: COMMERCIAL

## 2025-09-05 VITALS
WEIGHT: 200.19 LBS | BODY MASS INDEX: 32.31 KG/M2 | DIASTOLIC BLOOD PRESSURE: 82 MMHG | HEART RATE: 68 BPM | SYSTOLIC BLOOD PRESSURE: 126 MMHG

## 2025-09-05 DIAGNOSIS — M17.11 PRIMARY OSTEOARTHRITIS OF RIGHT KNEE: ICD-10-CM

## 2025-09-05 DIAGNOSIS — Z94.0 RENAL TRANSPLANT, STATUS POST: ICD-10-CM

## 2025-09-05 DIAGNOSIS — M32.19 OTHER SYSTEMIC LUPUS ERYTHEMATOSUS WITH OTHER ORGAN INVOLVEMENT: Primary | ICD-10-CM

## 2025-09-05 PROCEDURE — 73562 X-RAY EXAM OF KNEE 3: CPT | Mod: TC,RT

## 2025-09-05 PROCEDURE — 99999 PR PBB SHADOW E&M-EST. PATIENT-LVL V: CPT | Mod: PBBFAC,,, | Performed by: INTERNAL MEDICINE

## 2025-09-05 PROCEDURE — 73562 X-RAY EXAM OF KNEE 3: CPT | Mod: 26,RT,, | Performed by: RADIOLOGY

## 2025-09-05 RX ORDER — FERROUS SULFATE 325(65) MG
1 TABLET ORAL
COMMUNITY

## 2025-09-05 RX ORDER — MYCOPHENOLIC ACID 360 MG/1
720 TABLET, DELAYED RELEASE ORAL
COMMUNITY
Start: 2025-07-25

## 2025-09-05 RX ORDER — FAMOTIDINE 20 MG/1
20 TABLET, FILM COATED ORAL
COMMUNITY
Start: 2025-07-25

## 2025-09-05 RX ORDER — ACETAMINOPHEN 500 MG
2000 TABLET ORAL
COMMUNITY

## 2025-09-05 RX ORDER — TACROLIMUS 1 MG/1
CAPSULE ORAL
COMMUNITY